# Patient Record
Sex: MALE | Race: OTHER
[De-identification: names, ages, dates, MRNs, and addresses within clinical notes are randomized per-mention and may not be internally consistent; named-entity substitution may affect disease eponyms.]

---

## 2017-02-24 ENCOUNTER — APPOINTMENT (OUTPATIENT)
Dept: GASTROENTEROLOGY | Facility: CLINIC | Age: 62
End: 2017-02-24

## 2017-05-19 ENCOUNTER — APPOINTMENT (OUTPATIENT)
Dept: GASTROENTEROLOGY | Facility: CLINIC | Age: 62
End: 2017-05-19

## 2017-05-19 ENCOUNTER — OUTPATIENT (OUTPATIENT)
Dept: OUTPATIENT SERVICES | Facility: HOSPITAL | Age: 62
LOS: 1 days | Discharge: HOME | End: 2017-05-19

## 2017-05-19 RX ORDER — POLYETHYLENE GLYCOL 3350 AND ELECTROLYTES WITH LEMON FLAVOR 236; 22.74; 6.74; 5.86; 2.97 G/4L; G/4L; G/4L; G/4L; G/4L
236 POWDER, FOR SOLUTION ORAL
Qty: 4000 | Refills: 0 | Status: ACTIVE | COMMUNITY
Start: 2017-05-19 | End: 1900-01-01

## 2017-06-28 DIAGNOSIS — Z86.010 PERSONAL HISTORY OF COLONIC POLYPS: ICD-10-CM

## 2017-06-28 DIAGNOSIS — K92.2 GASTROINTESTINAL HEMORRHAGE, UNSPECIFIED: ICD-10-CM

## 2017-07-03 ENCOUNTER — MESSAGE (OUTPATIENT)
Age: 62
End: 2017-07-03

## 2017-07-05 ENCOUNTER — MESSAGE (OUTPATIENT)
Age: 62
End: 2017-07-05

## 2017-08-18 ENCOUNTER — APPOINTMENT (OUTPATIENT)
Dept: GASTROENTEROLOGY | Facility: CLINIC | Age: 62
End: 2017-08-18

## 2018-05-12 ENCOUNTER — OUTPATIENT (OUTPATIENT)
Dept: OUTPATIENT SERVICES | Facility: HOSPITAL | Age: 63
LOS: 1 days | Discharge: HOME | End: 2018-05-12

## 2018-05-12 DIAGNOSIS — M54.17 RADICULOPATHY, LUMBOSACRAL REGION: ICD-10-CM

## 2018-05-12 DIAGNOSIS — Z13.1 ENCOUNTER FOR SCREENING FOR DIABETES MELLITUS: ICD-10-CM

## 2021-05-04 ENCOUNTER — OUTPATIENT (OUTPATIENT)
Dept: OUTPATIENT SERVICES | Facility: HOSPITAL | Age: 66
LOS: 1 days | Discharge: HOME | End: 2021-05-04

## 2021-05-04 DIAGNOSIS — Z11.59 ENCOUNTER FOR SCREENING FOR OTHER VIRAL DISEASES: ICD-10-CM

## 2021-05-07 ENCOUNTER — TRANSCRIPTION ENCOUNTER (OUTPATIENT)
Age: 66
End: 2021-05-07

## 2021-05-07 ENCOUNTER — OUTPATIENT (OUTPATIENT)
Dept: OUTPATIENT SERVICES | Facility: HOSPITAL | Age: 66
LOS: 1 days | Discharge: HOME | End: 2021-05-07
Payer: COMMERCIAL

## 2021-05-07 ENCOUNTER — RESULT REVIEW (OUTPATIENT)
Age: 66
End: 2021-05-07

## 2021-05-07 VITALS
HEART RATE: 58 BPM | OXYGEN SATURATION: 99 % | DIASTOLIC BLOOD PRESSURE: 79 MMHG | RESPIRATION RATE: 18 BRPM | SYSTOLIC BLOOD PRESSURE: 122 MMHG

## 2021-05-07 VITALS
TEMPERATURE: 98 F | SYSTOLIC BLOOD PRESSURE: 106 MMHG | RESPIRATION RATE: 18 BRPM | WEIGHT: 179.9 LBS | DIASTOLIC BLOOD PRESSURE: 74 MMHG | HEART RATE: 68 BPM | HEIGHT: 67 IN

## 2021-05-07 PROCEDURE — 88305 TISSUE EXAM BY PATHOLOGIST: CPT | Mod: 26

## 2021-05-07 RX ORDER — ROSUVASTATIN CALCIUM 5 MG/1
1 TABLET ORAL
Qty: 0 | Refills: 0 | DISCHARGE

## 2021-05-07 RX ORDER — LEVOTHYROXINE SODIUM 125 MCG
1 TABLET ORAL
Qty: 0 | Refills: 0 | DISCHARGE

## 2021-05-07 NOTE — CHART NOTE - NSCHARTNOTEFT_GEN_A_CORE
PACU ANESTHESIA ADMISSION NOTE      Procedure: Colonoscopy  Post op diagnosis:      ____  Intubated  TV:______       Rate: ______      FiO2: ______    _x___  Patent Airway    _x___  Full return of protective reflexes    _x___  Full recovery from anesthesia / back to baseline     Vitals:   T:  97.7         R:    16              BP:    96/57              Sat:   98                P: 61      Mental Status:  _x___ Awake   _____ Alert   _____ Drowsy   _____ Sedated    Nausea/Vomiting:  _x___ NO  ______Yes,   See Post - Op Orders          Pain Scale (0-10):  _____    Treatment: ____ None    _x___ See Post - Op/PCA Orders    Post - Operative Fluids:   ____ Oral   __x__ See Post - Op Orders    Plan: Discharge:   _x___Home       _____Floor     _____Critical Care    _____  Other:_________________    Comments:

## 2021-05-07 NOTE — ASU DISCHARGE PLAN (ADULT/PEDIATRIC) - CALL YOUR DOCTOR IF YOU HAVE ANY OF THE FOLLOWING:
Abdominal Binder given to PACU nurse to apply once Pt is able to get up   Bleeding that does not stop/Pain not relieved by Medications/Numbness, tingling, color or temperature change to extremity/Nausea and vomiting that does not stop/Unable to urinate/Excessive diarrhea/Inability to tolerate liquids or foods/Increased irritability or sluggishness

## 2021-05-11 LAB — SURGICAL PATHOLOGY STUDY: SIGNIFICANT CHANGE UP

## 2021-05-18 DIAGNOSIS — D12.0 BENIGN NEOPLASM OF CECUM: ICD-10-CM

## 2021-05-18 DIAGNOSIS — D12.8 BENIGN NEOPLASM OF RECTUM: ICD-10-CM

## 2021-05-18 DIAGNOSIS — D12.5 BENIGN NEOPLASM OF SIGMOID COLON: ICD-10-CM

## 2021-05-18 DIAGNOSIS — K64.8 OTHER HEMORRHOIDS: ICD-10-CM

## 2021-05-18 DIAGNOSIS — Z12.11 ENCOUNTER FOR SCREENING FOR MALIGNANT NEOPLASM OF COLON: ICD-10-CM

## 2021-05-18 DIAGNOSIS — E03.9 HYPOTHYROIDISM, UNSPECIFIED: ICD-10-CM

## 2021-05-18 DIAGNOSIS — K57.30 DIVERTICULOSIS OF LARGE INTESTINE WITHOUT PERFORATION OR ABSCESS WITHOUT BLEEDING: ICD-10-CM

## 2021-05-18 DIAGNOSIS — K64.4 RESIDUAL HEMORRHOIDAL SKIN TAGS: ICD-10-CM

## 2021-05-18 DIAGNOSIS — F17.200 NICOTINE DEPENDENCE, UNSPECIFIED, UNCOMPLICATED: ICD-10-CM

## 2021-05-18 DIAGNOSIS — E78.00 PURE HYPERCHOLESTEROLEMIA, UNSPECIFIED: ICD-10-CM

## 2022-08-10 PROBLEM — E78.00 PURE HYPERCHOLESTEROLEMIA, UNSPECIFIED: Chronic | Status: ACTIVE | Noted: 2021-05-07

## 2022-08-10 PROBLEM — E03.9 HYPOTHYROIDISM, UNSPECIFIED: Chronic | Status: ACTIVE | Noted: 2021-05-07

## 2022-08-29 ENCOUNTER — APPOINTMENT (OUTPATIENT)
Age: 67
End: 2022-08-29

## 2022-08-29 VITALS
OXYGEN SATURATION: 98 % | HEART RATE: 75 BPM | DIASTOLIC BLOOD PRESSURE: 80 MMHG | BODY MASS INDEX: 27.78 KG/M2 | RESPIRATION RATE: 14 BRPM | SYSTOLIC BLOOD PRESSURE: 130 MMHG | WEIGHT: 177 LBS | HEIGHT: 67 IN

## 2022-08-29 DIAGNOSIS — R06.02 SHORTNESS OF BREATH: ICD-10-CM

## 2022-08-29 DIAGNOSIS — Z87.438 PERSONAL HISTORY OF OTHER DISEASES OF MALE GENITAL ORGANS: ICD-10-CM

## 2022-08-29 PROCEDURE — 99204 OFFICE O/P NEW MOD 45 MIN: CPT | Mod: 25

## 2022-08-29 PROCEDURE — G0296 VISIT TO DETERM LDCT ELIG: CPT

## 2022-08-29 PROCEDURE — 71046 X-RAY EXAM CHEST 2 VIEWS: CPT

## 2022-08-29 NOTE — COUNSELING
[Yes] : Risk of tobacco use and health benefits of smoking cessation discussed: Yes [ - Annual Lung Cancer Screening/Share Decision Making Discussion] : Annual Lung Cancer Screening/Share Decision Making Discussion. (I have advised this patient to have a Low Dose CT (LDCT) scan of the lungs and have discussed the following with the patient in a shared decision making discussion:   Benefits of Detection and Early Treatment: There is adequate evidence that annual screening for lung cancer with LDCT in a population of high-risk persons can prevent a substantial number of lung cancer–related deaths. The magnitude of benefit depends on the individual patient's risk for lung cancer, as those who are at highest risk are most likely to benefit. Screening cannot prevent most lung cancer–related deaths, and does not replace smoking cessation. Harms of Detection and Early Intervention and Treatment: The harms associated with LDCT screening include false-negative and false-positive results, incidental findings, over diagnosis, and radiation exposure. False-positive LDCT results occur in a substantial proportion of screened persons; 95% of all positive results do not lead to a diagnosis of cancer. In a high-quality screening program, further imaging can resolve most false-positive results; however, some patients may require invasive procedures. Radiation harms, including cancer resulting from cumulative exposure to radiation, vary depending on the age at the start of screening; the number of scans received; and the person's exposure to other sources of radiation, particularly other medical imaging.)

## 2022-08-29 NOTE — PHYSICAL EXAM
[No Acute Distress] : no acute distress [Normal Oropharynx] : normal oropharynx [III] : Mallampati Class: III [Normal Appearance] : normal appearance [Normal Rate/Rhythm] : normal rate/rhythm [Normal S1, S2] : normal s1, s2 [No Resp Distress] : no resp distress [Clear to Auscultation Bilaterally] : clear to auscultation bilaterally [No Clubbing] : no clubbing [No Cyanosis] : no cyanosis [No Edema] : no edema [Normal Color/ Pigmentation] : normal color/ pigmentation [No Focal Deficits] : no focal deficits [Oriented x3] : oriented x3

## 2022-08-29 NOTE — END OF VISIT
[] : Fellow [FreeTextEntry3] : patient seen and examined agree above note \par will start symbicort \par PFT ordered \par ct scan screening \par counseled for smoking cessation

## 2022-08-29 NOTE — PROCEDURE
[FreeTextEntry1] : Reason :SOB\par Overall Findings:\par \par View Ap and lateral  \par \par \par Lung Fields:hyperinflated small linear atelectasis LLL\par \par Normal Lung Markings                   No Infiltrate\par \par Pleura:\par \par No Pleural Effusions                      \par \par Comparison Films\par \par Unchanged         New Findings      Improved             Worsened\par \par final Interpretation: no acute evidence of pulmonary disease\par

## 2022-09-02 ENCOUNTER — APPOINTMENT (OUTPATIENT)
Dept: CARDIOTHORACIC SURGERY | Facility: CLINIC | Age: 67
End: 2022-09-02

## 2022-09-02 ENCOUNTER — RESULT CHARGE (OUTPATIENT)
Age: 67
End: 2022-09-02

## 2022-09-02 ENCOUNTER — APPOINTMENT (OUTPATIENT)
Dept: CARDIOLOGY | Facility: CLINIC | Age: 67
End: 2022-09-02

## 2022-09-02 VITALS
SYSTOLIC BLOOD PRESSURE: 97 MMHG | WEIGHT: 176 LBS | DIASTOLIC BLOOD PRESSURE: 73 MMHG | HEIGHT: 67 IN | BODY MASS INDEX: 27.62 KG/M2 | HEART RATE: 73 BPM

## 2022-09-02 DIAGNOSIS — F17.210 NICOTINE DEPENDENCE, CIGARETTES, UNCOMPLICATED: ICD-10-CM

## 2022-09-02 PROCEDURE — 99204 OFFICE O/P NEW MOD 45 MIN: CPT | Mod: 25

## 2022-09-02 PROCEDURE — G0296 VISIT TO DETERM LDCT ELIG: CPT

## 2022-09-02 PROCEDURE — 93000 ELECTROCARDIOGRAM COMPLETE: CPT

## 2022-09-02 NOTE — DISCUSSION/SUMMARY
[FreeTextEntry1] : ECHO to evaluate for structural heart disease.\par Exercise nuclear stress test to evaluate for ischemia.\par Pulm follow-up.\par Follow-up 6-weeks.

## 2022-09-02 NOTE — PHYSICAL EXAM
[de-identified] : well appearing, no distress [de-identified] : reg, nL s1/s2, no m/r/g [de-identified] : faint scattered expiratory wheeze, otherwise CTA [de-identified] : no edema [de-identified] : alert, nL affect, logical conversation

## 2022-09-02 NOTE — REASON FOR VISIT
[Home] : at home, [unfilled] , at the time of the visit. [Medical Office: ([facility])___] : at the medical office located in  [Verbal consent obtained from patient] : the patient, [unfilled] [Initial Evaluation] : an initial evaluation visit [Review of Eligibility] : review of eligibility [Low-Dose CT Screening Discussion] : low-dose CT lung cancer screening discussion [Virtual Visit] : virtual visit

## 2022-09-02 NOTE — PLAN
[Smoking Cessation Guidance Provided] : Smoking cessation guidance was provided to patient [Smoking Cessation] : smoking cessation [Lifestlye changes] : lifestyle changes [Regular follow-up with healthcare provider] : regular follow-up with healthcare provider [Medication prescribed/changed as per orders] : medication prescribed/changed as per orders [FreeTextEntry1] : Plan:\par -Low Dose CT chest for lung cancer screening\par -Follow up with patient and his referring provider after his LDCT results have been reviewed by the multi-disciplinary clinical team\par -Encouraged smoking cessation\par -[facility] Smokers Quitline literature shared with patient and Staying Smoke Free brochure reviewed\par -Patient declined [facility] Smokers Quitline literature\par -Smoking Cessation was offered, refused \par -Referred to CTC - refused \par Should I Screen? tool utilized. 6 year risk of lung cancer is 4 %. Patient wishes to proceed with screening.\par Engaged in shared decision making with Mr. FLAVIA TOVAR . Answered all questions. He verbalized understanding and agreement. He knows to call back with any questions or concerns\par \par

## 2022-09-02 NOTE — REASON FOR VISIT
[FreeTextEntry1] : 67 year-old male referred for cardiac evaluation.\par \par No cardiac history.\par Risk factors include smoking.\par \par Exertional dyspnea (stairs).  Relatively new.  Breathing otherwise comfortable.\par \par No chest pain.  No  palpitations, lightheadedness, syncope.\par \par Saw Dr. Gaytan / pulm evaluation.\par \par PMH/PSH:\par COPD\par Hypothyroid\par BPH\par \par SOCIAL:\par Longstanding smoker (1/2 - 1/4 ppd)\par Contractor\par \par FAMILY:\par No known CV disease.

## 2022-09-19 ENCOUNTER — APPOINTMENT (OUTPATIENT)
Dept: CARDIOLOGY | Facility: CLINIC | Age: 67
End: 2022-09-19

## 2022-09-19 PROCEDURE — 93306 TTE W/DOPPLER COMPLETE: CPT

## 2022-09-21 ENCOUNTER — OUTPATIENT (OUTPATIENT)
Dept: OUTPATIENT SERVICES | Facility: HOSPITAL | Age: 67
LOS: 1 days | Discharge: HOME | End: 2022-09-21

## 2022-09-21 ENCOUNTER — RESULT REVIEW (OUTPATIENT)
Age: 67
End: 2022-09-21

## 2022-09-21 DIAGNOSIS — R06.02 SHORTNESS OF BREATH: ICD-10-CM

## 2022-09-21 PROCEDURE — 78452 HT MUSCLE IMAGE SPECT MULT: CPT | Mod: 26

## 2022-09-24 ENCOUNTER — OUTPATIENT (OUTPATIENT)
Dept: OUTPATIENT SERVICES | Facility: HOSPITAL | Age: 67
LOS: 1 days | Discharge: HOME | End: 2022-09-24

## 2022-09-24 ENCOUNTER — RESULT REVIEW (OUTPATIENT)
Age: 67
End: 2022-09-24

## 2022-09-24 DIAGNOSIS — R91.8 OTHER NONSPECIFIC ABNORMAL FINDING OF LUNG FIELD: ICD-10-CM

## 2022-09-24 PROCEDURE — 71271 CT THORAX LUNG CANCER SCR C-: CPT | Mod: 26

## 2022-11-21 ENCOUNTER — APPOINTMENT (OUTPATIENT)
Age: 67
End: 2022-11-21

## 2022-11-21 VITALS
RESPIRATION RATE: 14 BRPM | HEART RATE: 77 BPM | HEIGHT: 67 IN | OXYGEN SATURATION: 98 % | SYSTOLIC BLOOD PRESSURE: 106 MMHG | BODY MASS INDEX: 28.56 KG/M2 | DIASTOLIC BLOOD PRESSURE: 62 MMHG | WEIGHT: 182 LBS

## 2022-11-21 DIAGNOSIS — Z12.2 ENCOUNTER FOR SCREENING FOR MALIGNANT NEOPLASM OF RESPIRATORY ORGANS: ICD-10-CM

## 2022-11-21 PROCEDURE — 94010 BREATHING CAPACITY TEST: CPT

## 2022-11-21 PROCEDURE — 99214 OFFICE O/P EST MOD 30 MIN: CPT | Mod: 25

## 2022-11-21 NOTE — PHYSICAL EXAM
[No Acute Distress] : no acute distress [Normal Oropharynx] : normal oropharynx [Normal Appearance] : normal appearance [No Neck Mass] : no neck mass [Normal Rate/Rhythm] : normal rate/rhythm [Normal S1, S2] : normal s1, s2 [No Resp Distress] : no resp distress [Clear to Auscultation Bilaterally] : clear to auscultation bilaterally [Oriented x3] : oriented x3

## 2022-11-22 RX ORDER — BUDESONIDE AND FORMOTEROL FUMARATE DIHYDRATE 80; 4.5 UG/1; UG/1
80-4.5 AEROSOL RESPIRATORY (INHALATION) TWICE DAILY
Qty: 1 | Refills: 5 | Status: ACTIVE | COMMUNITY
Start: 2022-08-29 | End: 1900-01-01

## 2023-01-03 ENCOUNTER — OUTPATIENT (OUTPATIENT)
Dept: OUTPATIENT SERVICES | Facility: HOSPITAL | Age: 68
LOS: 1 days | Discharge: HOME | End: 2023-01-03
Payer: COMMERCIAL

## 2023-01-03 DIAGNOSIS — R06.02 SHORTNESS OF BREATH: ICD-10-CM

## 2023-01-03 PROCEDURE — 94727 GAS DIL/WSHOT DETER LNG VOL: CPT | Mod: 26

## 2023-01-03 PROCEDURE — 94729 DIFFUSING CAPACITY: CPT | Mod: 26

## 2023-01-03 PROCEDURE — 94060 EVALUATION OF WHEEZING: CPT | Mod: 26

## 2023-02-15 ENCOUNTER — APPOINTMENT (OUTPATIENT)
Age: 68
End: 2023-02-15

## 2024-05-15 ENCOUNTER — EMERGENCY (EMERGENCY)
Facility: HOSPITAL | Age: 69
LOS: 0 days | Discharge: HOME | End: 2024-05-15
Attending: EMERGENCY MEDICINE
Payer: COMMERCIAL

## 2024-05-15 VITALS
OXYGEN SATURATION: 99 % | DIASTOLIC BLOOD PRESSURE: 70 MMHG | SYSTOLIC BLOOD PRESSURE: 103 MMHG | HEART RATE: 64 BPM | TEMPERATURE: 98 F | RESPIRATION RATE: 18 BRPM

## 2024-05-15 VITALS
HEART RATE: 75 BPM | SYSTOLIC BLOOD PRESSURE: 107 MMHG | RESPIRATION RATE: 20 BRPM | TEMPERATURE: 98 F | DIASTOLIC BLOOD PRESSURE: 68 MMHG | OXYGEN SATURATION: 95 %

## 2024-05-15 DIAGNOSIS — H53.8 OTHER VISUAL DISTURBANCES: ICD-10-CM

## 2024-05-15 DIAGNOSIS — R05.9 COUGH, UNSPECIFIED: ICD-10-CM

## 2024-05-15 DIAGNOSIS — E03.9 HYPOTHYROIDISM, UNSPECIFIED: ICD-10-CM

## 2024-05-15 DIAGNOSIS — R42 DIZZINESS AND GIDDINESS: ICD-10-CM

## 2024-05-15 DIAGNOSIS — R06.02 SHORTNESS OF BREATH: ICD-10-CM

## 2024-05-15 DIAGNOSIS — N40.0 BENIGN PROSTATIC HYPERPLASIA WITHOUT LOWER URINARY TRACT SYMPTOMS: ICD-10-CM

## 2024-05-15 DIAGNOSIS — F17.210 NICOTINE DEPENDENCE, CIGARETTES, UNCOMPLICATED: ICD-10-CM

## 2024-05-15 LAB
ALBUMIN SERPL ELPH-MCNC: 4.4 G/DL — SIGNIFICANT CHANGE UP (ref 3.5–5.2)
ALP SERPL-CCNC: 54 U/L — SIGNIFICANT CHANGE UP (ref 30–115)
ALT FLD-CCNC: 19 U/L — SIGNIFICANT CHANGE UP (ref 0–41)
ANION GAP SERPL CALC-SCNC: 12 MMOL/L — SIGNIFICANT CHANGE UP (ref 7–14)
AST SERPL-CCNC: 20 U/L — SIGNIFICANT CHANGE UP (ref 0–41)
BILIRUB SERPL-MCNC: 0.3 MG/DL — SIGNIFICANT CHANGE UP (ref 0.2–1.2)
BUN SERPL-MCNC: 21 MG/DL — HIGH (ref 10–20)
CALCIUM SERPL-MCNC: 9.1 MG/DL — SIGNIFICANT CHANGE UP (ref 8.4–10.5)
CHLORIDE SERPL-SCNC: 107 MMOL/L — SIGNIFICANT CHANGE UP (ref 98–110)
CO2 SERPL-SCNC: 23 MMOL/L — SIGNIFICANT CHANGE UP (ref 17–32)
CREAT SERPL-MCNC: 1.4 MG/DL — SIGNIFICANT CHANGE UP (ref 0.7–1.5)
EGFR: 55 ML/MIN/1.73M2 — LOW
GLUCOSE SERPL-MCNC: 94 MG/DL — SIGNIFICANT CHANGE UP (ref 70–99)
HCT VFR BLD CALC: 39.2 % — LOW (ref 42–52)
HGB BLD-MCNC: 13.1 G/DL — LOW (ref 14–18)
MCHC RBC-ENTMCNC: 29.8 PG — SIGNIFICANT CHANGE UP (ref 27–31)
MCHC RBC-ENTMCNC: 33.4 G/DL — SIGNIFICANT CHANGE UP (ref 32–37)
MCV RBC AUTO: 89.3 FL — SIGNIFICANT CHANGE UP (ref 80–94)
NRBC # BLD: 0 /100 WBCS — SIGNIFICANT CHANGE UP (ref 0–0)
PLATELET # BLD AUTO: 176 K/UL — SIGNIFICANT CHANGE UP (ref 130–400)
PMV BLD: 10.1 FL — SIGNIFICANT CHANGE UP (ref 7.4–10.4)
POTASSIUM SERPL-MCNC: 4.3 MMOL/L — SIGNIFICANT CHANGE UP (ref 3.5–5)
POTASSIUM SERPL-SCNC: 4.3 MMOL/L — SIGNIFICANT CHANGE UP (ref 3.5–5)
PROT SERPL-MCNC: 7.3 G/DL — SIGNIFICANT CHANGE UP (ref 6–8)
RBC # BLD: 4.39 M/UL — LOW (ref 4.7–6.1)
RBC # FLD: 14.6 % — HIGH (ref 11.5–14.5)
SODIUM SERPL-SCNC: 142 MMOL/L — SIGNIFICANT CHANGE UP (ref 135–146)
WBC # BLD: 7.31 K/UL — SIGNIFICANT CHANGE UP (ref 4.8–10.8)
WBC # FLD AUTO: 7.31 K/UL — SIGNIFICANT CHANGE UP (ref 4.8–10.8)

## 2024-05-15 PROCEDURE — 85027 COMPLETE CBC AUTOMATED: CPT

## 2024-05-15 PROCEDURE — 99285 EMERGENCY DEPT VISIT HI MDM: CPT | Mod: 25

## 2024-05-15 PROCEDURE — 93010 ELECTROCARDIOGRAM REPORT: CPT

## 2024-05-15 PROCEDURE — 99285 EMERGENCY DEPT VISIT HI MDM: CPT

## 2024-05-15 PROCEDURE — 71045 X-RAY EXAM CHEST 1 VIEW: CPT

## 2024-05-15 PROCEDURE — 71045 X-RAY EXAM CHEST 1 VIEW: CPT | Mod: 26

## 2024-05-15 PROCEDURE — 70450 CT HEAD/BRAIN W/O DYE: CPT | Mod: 26,MC

## 2024-05-15 PROCEDURE — 80053 COMPREHEN METABOLIC PANEL: CPT

## 2024-05-15 PROCEDURE — 70450 CT HEAD/BRAIN W/O DYE: CPT | Mod: MC

## 2024-05-15 PROCEDURE — 36415 COLL VENOUS BLD VENIPUNCTURE: CPT

## 2024-05-15 PROCEDURE — 36000 PLACE NEEDLE IN VEIN: CPT

## 2024-05-15 PROCEDURE — 93005 ELECTROCARDIOGRAM TRACING: CPT

## 2024-05-15 RX ORDER — SODIUM CHLORIDE 9 MG/ML
1000 INJECTION INTRAMUSCULAR; INTRAVENOUS; SUBCUTANEOUS ONCE
Refills: 0 | Status: COMPLETED | OUTPATIENT
Start: 2024-05-15 | End: 2024-05-15

## 2024-05-15 RX ADMIN — SODIUM CHLORIDE 1000 MILLILITER(S): 9 INJECTION INTRAMUSCULAR; INTRAVENOUS; SUBCUTANEOUS at 14:40

## 2024-05-15 NOTE — ED PROVIDER NOTE - PATIENT PORTAL LINK FT
You can access the FollowMyHealth Patient Portal offered by [facility] by registering at the following website: [email or web address]. By joining [employer]’s FollowMyHealth portal, you will also be able to view your health information using other applications (apps) compatible with our system.

## 2024-05-15 NOTE — ED PROVIDER NOTE - CARE PLAN
1 Principal Discharge DX:	Dizziness  Assessment and plan of treatment:	Patient is a 69 y.o. M with history of hypothyroidism and BPH who presents with dizziness after standing from a seated position and chronic back pain. Vitals stable. EKG unremarkable, Head CT unremarkable, Chest X-Ray unremarkable, CBC and CMP unremarkable.   Plan:  Patient cleared for discharge  F/u with PCP for chronic issues

## 2024-05-15 NOTE — ED PROVIDER NOTE - ATTENDING CONTRIBUTION TO CARE
69-year-old who has been complaining of 1 year history of back pain that is intermittent with dizziness and blurry vision the symptoms are sometimes precipitated by standing states that it is becoming more frequent denies any vomiting or headache the pain radiates to his lower extremities but he has no weakness.  Denies any abdominal pain.  His exam shows that he has normal gait normal strength in his lower extremities normal sensory grossly is no focal tenderness over his back his lungs are clear his cranial nerves II through XII are intact.  His finger-nose is intact.  There is no nystagmus noted.  He has normal patellar reflexes bilaterally.  Plan is to obtain labs head CT chest x-ray and labs and ekg

## 2024-05-15 NOTE — ED PROVIDER NOTE - PLAN OF CARE
Patient is a 69 y.o. M with history of hypothyroidism and BPH who presents with dizziness after standing from a seated position and chronic back pain. Vitals stable. EKG unremarkable, Head CT unremarkable, Chest X-Ray unremarkable, CBC and CMP unremarkable.   Plan:  Patient cleared for discharge  F/u with PCP for chronic issues

## 2024-05-15 NOTE — ED PROVIDER NOTE - EKG/XRAY ADDITIONAL INFORMATION
independent interpretation of ED X-Ray films performed by Dr. Joaquin Lozano shows no infiltrate no ptx    independent interpretation of the ekg performed by Dr. Joaquin Lozano shows Normal sinus rhythm with a heart rate of 74 OR interval 142 QRS 74 QTc 392 no elevations or depressions

## 2024-05-15 NOTE — ED PROVIDER NOTE - DIFFERENTIAL DIAGNOSIS
Vertigo, musculoskeletal back pain, electrolyte abnormalities, intracranial mass. Differential Diagnosis

## 2024-05-15 NOTE — ED PROVIDER NOTE - NSFOLLOWUPINSTRUCTIONS_ED_ALL_ED_FT
Dizziness is a common problem. It is a feeling of unsteadiness or light-headedness. You may feel like you are about to faint. Dizziness can lead to injury if you stumble or fall. Anyone can become dizzy, but dizziness is more common in older adults. This condition can be caused by a number of things, including medicines, dehydration.    Follow these instructions at home:  Eating and drinking    Drink enough fluid to keep your urine pale yellow. This helps to keep you from becoming dehydrated. Try to drink more clear fluids, such as water.  Do not drink alcohol.  Limit your caffeine intake.   Limit your salt (sodium) intake    Activity  Avoid making quick movements.  Rise slowly from chairs and steady yourself until you feel okay.  In the morning, first sit up on the side of the bed. When you feel okay, stand slowly while you hold onto something until you know that your balance is good.  If you need to  one place for a long time, move your legs often. Tighten and relax the muscles in your legs while you are standing.  Do not drive or use machinery if you feel dizzy.  Avoid bending down if you feel dizzy. Place items in your home so that they are easy for you to reach without leaning over.    Lifestyle  Do not use any products that contain nicotine or tobacco. These products include cigarettes, chewing tobacco, and vaping devices, such as e-cigarettes. If you need help quitting, ask your health care provider.  Try to reduce your stress level by using methods such as yoga or meditation. Talk with your health care provider if you need help to manage your stress.    General instructions  Watch your dizziness for any changes.    Keep all follow-up visits. This is important.  Contact a health care provider if:  Your dizziness or light-headedness gets worse.  You feel nauseous.  You have reduced hearing.  You have a fever.  You have neck pain or a stiff neck.  Your dizziness leads to an injury or a fall.  Get help right away if:  You vomit or have diarrhea and are unable to eat or drink anything.  You have problems talking, walking, swallowing, or using your arms, hands, or legs.  You have any bleeding.  You are not thinking clearly or you have trouble forming sentences. It may take a friend or family member to notice this.  You have chest pain, abdominal pain, or sweating.  You develop a severe headache.  These symptoms may represent a serious problem that is an emergency. Do not drive yourself to the hospital.

## 2024-05-15 NOTE — ED ADULT TRIAGE NOTE - CHIEF COMPLAINT QUOTE
pt. states for the past few months has been dizzy , shaking a lot and legs give out . wants to be check out

## 2024-05-15 NOTE — ED PROVIDER NOTE - CLINICAL SUMMARY MEDICAL DECISION MAKING FREE TEXT BOX
Patient evaluated for chronic complaints with some mild acute exacerbation.  Lab work and imaging obtained which not show any acute pathology.  Given his normal exam and lab work in addition to imaging patient can follow-up with primary care for further workup

## 2024-05-15 NOTE — ED PROVIDER NOTE - CARE PROVIDER_API CALL
Robe Marcial  [address] [address]  Phone: [phone]  Fax: [phone]  Follow Up Time:

## 2024-05-29 ENCOUNTER — APPOINTMENT (OUTPATIENT)
Dept: NEUROLOGY | Facility: CLINIC | Age: 69
End: 2024-05-29
Payer: COMMERCIAL

## 2024-05-29 PROCEDURE — 99204 OFFICE O/P NEW MOD 45 MIN: CPT

## 2024-05-30 VITALS
WEIGHT: 182 LBS | BODY MASS INDEX: 28.56 KG/M2 | SYSTOLIC BLOOD PRESSURE: 92 MMHG | HEIGHT: 67 IN | DIASTOLIC BLOOD PRESSURE: 59 MMHG

## 2024-05-30 NOTE — ASSESSMENT
[FreeTextEntry1] : The impression is dizziness and lightheadedness which could be related to low blood pressure. I requested he have his pressure checked again by his medical doctor and have him look for anemia or a source of low blood pressure. We will see him again after his MRI is done. I don't think this is primarily on a CNS basis.     Total clinician time spent today on the patient is 45 minutes including preparing to see the patient, obtaining and/or reviewing and confirming history, performing medically necessary and appropriate examination, counseling and educating the patient and/or family, documenting clinical information in the EHR and communicating and/or referring to other healthcare professionals.    Entered by Valente Garnica acting as scribe for Dr. Bryant.   The documentation recorded by the scribe, in my presence, accurately reflects the service I personally performed, and the decisions made by me with my edits as appropriate.   Silvio Bryant MD, FAAN, FACP   Diplomate [employer] Board of Psychiatry & Neurology.

## 2024-05-30 NOTE — PHYSICAL EXAM
[FreeTextEntry1] : NEUROLOGICAL EXAMINATION:   *Blood pressure was 92/59 during today's visit.   Mental Status: Patient is a good informant with intact orientation, attention, concentration, recent and remote memory. Language evaluation reveals no evidence of aphasia. Fund of knowledge is normal.  Cranial Nerves Cranial Nerves:   II, III, IV, VI: Pupils are equal, round, and reactive to light and accommodation. No evidence of afferent pupillary defect. Visual fields are full to confrontation. Eye movements are full without evidence of nystagmus or internuclear ophthalmoplegia. Funduscopic examination reveals sharp disc margins.   V: Normal jaw movements. Normal facial sensation.   VII: Normal facial motor testing.   VIII: Grossly normal hearing bilaterally.   IX, X: Palate moves symmetrically. No dysarthria.   XI: Normal shoulder shrug and sternocleidomastoid power.   XII: Tongue appears normal and protrudes in the midline.   Motor: Normal bulk, tone, and power throughout.   Muscle Stretch Reflexes (right/left): 2+ symmetrical.   Plantar Responses: Flexor bilaterally.   Coordination: Normal finger to nose and heel to shin testing, no truncal ataxia and no tremor.   Sensation: Normal primary sensation. Normal double simultaneous stimulation.   Gait and Station: Normal base, stride, and turning. Normal toe and heel walking. Normal tandem. Negative Romberg.

## 2024-05-30 NOTE — HISTORY OF PRESENT ILLNESS
[FreeTextEntry1] : This is a neurologic consultation on Luis Mckeon. Patient is a 69-year-old man with a history of COPD cigarette smoker  who is seeing us on this occasion for symptoms of dizziness and lightheadedness which he has had for a few months. He doesn't pass out and he hasn't fainted but he does feel unsteady at times, he has not fallen. Of note is the fact that he did see cardiology 2 years ago and at that time his bp was recorded as 97/73 today his blood pressure was 92/59 which is on the low side for a 69-year-old man. He is not taking any medications, he is not diabetic, he does have some exertional dyspnea which is probably related to his cigarette smoking habit. He denies any major illnesses. He rarely drinks he does smoke at least 1 pack a day.

## 2024-06-06 ENCOUNTER — APPOINTMENT (OUTPATIENT)
Dept: CARDIOLOGY | Facility: CLINIC | Age: 69
End: 2024-06-06
Payer: COMMERCIAL

## 2024-06-06 VITALS — HEART RATE: 64 BPM | DIASTOLIC BLOOD PRESSURE: 80 MMHG | SYSTOLIC BLOOD PRESSURE: 108 MMHG

## 2024-06-06 VITALS — WEIGHT: 182 LBS | BODY MASS INDEX: 28.56 KG/M2 | HEIGHT: 67 IN

## 2024-06-06 DIAGNOSIS — R06.00 DYSPNEA, UNSPECIFIED: ICD-10-CM

## 2024-06-06 DIAGNOSIS — E03.9 HYPOTHYROIDISM, UNSPECIFIED: ICD-10-CM

## 2024-06-06 DIAGNOSIS — R42 DIZZINESS AND GIDDINESS: ICD-10-CM

## 2024-06-06 DIAGNOSIS — E78.2 MIXED HYPERLIPIDEMIA: ICD-10-CM

## 2024-06-06 DIAGNOSIS — N18.31 CHRONIC KIDNEY DISEASE, STAGE 3A: ICD-10-CM

## 2024-06-06 DIAGNOSIS — Z00.00 ENCOUNTER FOR GENERAL ADULT MEDICAL EXAMINATION W/OUT ABNORMAL FINDINGS: ICD-10-CM

## 2024-06-06 DIAGNOSIS — F17.200 NICOTINE DEPENDENCE, UNSPECIFIED, UNCOMPLICATED: ICD-10-CM

## 2024-06-06 PROCEDURE — 99204 OFFICE O/P NEW MOD 45 MIN: CPT | Mod: 25

## 2024-06-06 PROCEDURE — 93000 ELECTROCARDIOGRAM COMPLETE: CPT

## 2024-06-06 RX ORDER — METOPROLOL TARTRATE 100 MG/1
100 TABLET, FILM COATED ORAL
Qty: 2 | Refills: 0 | Status: ACTIVE | COMMUNITY
Start: 2024-06-06 | End: 1900-01-01

## 2024-06-06 RX ORDER — ROSUVASTATIN CALCIUM 20 MG/1
20 TABLET, FILM COATED ORAL
Qty: 90 | Refills: 3 | Status: ACTIVE | COMMUNITY
Start: 2024-06-06 | End: 1900-01-01

## 2024-06-06 RX ORDER — TAMSULOSIN HYDROCHLORIDE 0.4 MG/1
0.4 CAPSULE ORAL
Refills: 0 | Status: ACTIVE | COMMUNITY

## 2024-06-06 RX ORDER — LEVOTHYROXINE SODIUM 0.09 MG/1
88 TABLET ORAL
Refills: 0 | Status: ACTIVE | COMMUNITY

## 2024-06-19 NOTE — ED PROVIDER NOTE - OBJECTIVE STATEMENT
Detail Level: Generalized
69 y.o. M complaining of dizziness and sharp back pain for more than 1 year since straining back after lifting boxes at work. Pt reports that dizziness is accompanied by shortness of breath and blurry vision. Pt reports back pain occurs when sitting or standing for too long and dizziness and unsteadiness is precipitated by standing from a seated position. He reports back pain not currently present but is a 9/10 on pain scale when present and radiates to b/l LE. He reports that pain and dizziness started as every other day, then became daily and now it occurs multiple times during the day.
Detail Level: Detailed

## 2024-06-24 ENCOUNTER — APPOINTMENT (OUTPATIENT)
Dept: NEUROLOGY | Facility: CLINIC | Age: 69
End: 2024-06-24
Payer: COMMERCIAL

## 2024-06-24 VITALS
BODY MASS INDEX: 28.25 KG/M2 | HEART RATE: 67 BPM | HEIGHT: 67 IN | WEIGHT: 180 LBS | SYSTOLIC BLOOD PRESSURE: 114 MMHG | DIASTOLIC BLOOD PRESSURE: 73 MMHG

## 2024-06-24 PROCEDURE — 99214 OFFICE O/P EST MOD 30 MIN: CPT

## 2024-06-27 ENCOUNTER — APPOINTMENT (OUTPATIENT)
Dept: CARDIOLOGY | Facility: CLINIC | Age: 69
End: 2024-06-27

## 2024-06-27 PROCEDURE — 93306 TTE W/DOPPLER COMPLETE: CPT

## 2024-07-01 LAB
ALBUMIN SERPL ELPH-MCNC: 4.4 G/DL
ALP BLD-CCNC: 69 U/L
ALT SERPL-CCNC: 17 U/L
ANION GAP SERPL CALC-SCNC: 11 MMOL/L
AST SERPL-CCNC: 17 U/L
BILIRUB SERPL-MCNC: 0.2 MG/DL
BUN SERPL-MCNC: 16 MG/DL
CALCIUM SERPL-MCNC: 9.1 MG/DL
CHLORIDE SERPL-SCNC: 106 MMOL/L
CHOLEST SERPL-MCNC: 120 MG/DL
CO2 SERPL-SCNC: 24 MMOL/L
CREAT SERPL-MCNC: 1.28 MG/DL
EGFR: 61 ML/MIN/1.73M2
ESTIMATED AVERAGE GLUCOSE: 103 MG/DL
GLUCOSE SERPL-MCNC: 99 MG/DL
HBA1C MFR BLD HPLC: 5.2 %
HCT VFR BLD CALC: 39.4 %
HDLC SERPL-MCNC: 40 MG/DL
HGB BLD-MCNC: 12.2 G/DL
LDLC SERPL CALC-MCNC: 55 MG/DL
MCHC RBC-ENTMCNC: 29.8 PG
MCHC RBC-ENTMCNC: 31 GM/DL
MCV RBC AUTO: 96.1 FL
NONHDLC SERPL-MCNC: 79 MG/DL
NT-PROBNP SERPL-MCNC: 120 PG/ML
PLATELET # BLD AUTO: 217 K/UL
POTASSIUM SERPL-SCNC: 4.4 MMOL/L
PROT SERPL-MCNC: 7.4 G/DL
RBC # BLD: 4.1 M/UL
RBC # FLD: 14.9 %
SODIUM SERPL-SCNC: 141 MMOL/L
TRIGL SERPL-MCNC: 141 MG/DL
TSH SERPL-ACNC: 11.5 UIU/ML
WBC # FLD AUTO: 6.4 K/UL

## 2024-07-01 NOTE — DISCUSSION/SUMMARY
[EKG obtained to assist in diagnosis and management of assessed problem(s)] : EKG obtained to assist in diagnosis and management of assessed problem(s) [FreeTextEntry1] : Pt orthostatic increase hydration and on tamsulosin  d/w with urology  get CTA  stop smoking  get 2 d echo  start rosuvastatin 20 mg po qhs as high 10 year risk f/u in 4 months bloodwork

## 2024-07-01 NOTE — HISTORY OF PRESENT ILLNESS
[FreeTextEntry1] : PT with COPD, hypothyroid, CKDz 3a, h/o orthostatic hypotension sent for cv evaluation.   24: TSH: 19 improved to 29 TC: 212 T HDL 51 and T 10 year risk 17.2%   ekg: nsr with nsst changes.   Patient denies cp, syncope. Patient c/o sob, dizziness with postural changes.pt says starts shaking gets up and says was happening last month was more frequent, now improved. pt says does not sleep much  pt says sometimes drinks a half a gallon/day.  pt says sob at times, sometimes with getting up. 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 denies cp or chest pressure, no syncope. pt denies palpitations.  EKG: nsr with nsst changes.  pt SMOKING 1/2 PPD.   10/3/24: 24: BNP: 120, HDL: 40, T, LDL: 55, TSH: 11.5 24: EF: 50-55%, LVMI: 102 g/m2, ivsd/lvpw: 1.3 cm, e' sept; 0.07 m/s, E [place]: 0.47 m/s, LVOT VTI: 14.5 cm, GLS: -17.1%, basilar/mid decrease. DD1, SoV: 3.7 cm, Asc Ao: 3.7 cm. borderline LVH

## 2024-07-12 ENCOUNTER — RESULT REVIEW (OUTPATIENT)
Age: 69
End: 2024-07-12

## 2024-07-12 ENCOUNTER — OUTPATIENT (OUTPATIENT)
Dept: OUTPATIENT SERVICES | Facility: HOSPITAL | Age: 69
LOS: 1 days | End: 2024-07-12
Payer: COMMERCIAL

## 2024-07-12 DIAGNOSIS — Z00.8 ENCOUNTER FOR OTHER GENERAL EXAMINATION: ICD-10-CM

## 2024-07-12 DIAGNOSIS — R06.02 SHORTNESS OF BREATH: ICD-10-CM

## 2024-07-12 PROCEDURE — 75574 CT ANGIO HRT W/3D IMAGE: CPT | Mod: 26

## 2024-07-12 PROCEDURE — 75574 CT ANGIO HRT W/3D IMAGE: CPT

## 2024-07-13 ENCOUNTER — APPOINTMENT (OUTPATIENT)
Dept: MRI IMAGING | Facility: CLINIC | Age: 69
End: 2024-07-13

## 2024-07-13 DIAGNOSIS — R06.02 SHORTNESS OF BREATH: ICD-10-CM

## 2024-07-23 PROBLEM — I25.10 CORONARY ARTERY STENOSIS: Status: ACTIVE | Noted: 2024-07-23

## 2024-07-24 ENCOUNTER — RESULT REVIEW (OUTPATIENT)
Age: 69
End: 2024-07-24

## 2024-07-28 VITALS
HEART RATE: 65 BPM | WEIGHT: 179.9 LBS | OXYGEN SATURATION: 97 % | DIASTOLIC BLOOD PRESSURE: 89 MMHG | HEIGHT: 67 IN | SYSTOLIC BLOOD PRESSURE: 141 MMHG | RESPIRATION RATE: 16 BRPM

## 2024-07-28 NOTE — H&P CARDIOLOGY - COMMENTS
68 y/o male w/ PMHx of HTN, HLD, hypothyroidism, BPH, CKD (baseline Cr 1.3-1.4), who presented to his cardiologist with c/o SOB on walking uphill and 1-2 flights of stairs, with subsequent abnormal CCTA on 7/12/24, and CT FFR 7/25/24 -> LAD 0.59, LCx , OM 0.82, RCA 0.82. Patient presents today for [facility] with possible intervention if clinically indicated.

## 2024-07-28 NOTE — H&P CARDIOLOGY - NSICDXPASTMEDICALHX_GEN_ALL_CORE_FT
PAST MEDICAL HISTORY:  High cholesterol     Hypothyroidism, unspecified type      PAST MEDICAL HISTORY:  BPH (benign prostatic hyperplasia)     Chronic kidney disease (CKD)     High cholesterol     HTN (hypertension)     Hypothyroidism, unspecified type

## 2024-07-28 NOTE — H&P CARDIOLOGY - HISTORY OF PRESENT ILLNESS
Patient is a 70 y/o male w/ PMHx: BPH. Patient reports SOB and dizziness with postural changes. CCTA 7/22  and FFR LAD 0.59, LCx  OM 0.82, RCA 0.82. Patient presents today for [facility] with possible intervention.    Pre cath note:    indication:  [ ] STEMI                [ ] NSTEMI                 [ ] Acute coronary syndrome                     [ ]Unstable Angina   [ ] high risk  [ ] intermediate risk  [ ] low risk                     [ ] Stable Angina     non-invasive testing:    CCTA    Date:    7/22    result: [ ] high risk  [x ] intermediate risk  [ ] low risk    Anti- Anginal medications:                    [ ] not used                       [x ] used :     -BB, Ranexa              [ ] not used but strong indication not to use    Ejection Fraction                   [ ] <29            [ ] 30-39%   [ ] 40-49%     [ ]>50%    CHF                   [ ] active (within last 14 days on meds   [ ] Chronic (on meds but no exacerbation)    COPD                   [ ] mild (on chronic bronchodilators)  [ ] moderate (on chronic steroid therapy)      [ ] severe (indication for home O2 or PACO2 >50)    Other risk factors:                       [ ] Previous MI                     [ ] CVA/ stroke                    [ ] carotid stent/ CEA                    [ ] PVD/PAD- (arterial aneurysm, non-palpable pulses, tortuous vessel with inability to insert catheter, infra-renal dissection, renal or subclavian artery stenosis)                    [ ] diabetic                    [ ] previous CABG                    [ ] Renal Failure             Right Filippo Test:    Adjusted Cath Bleeding Risk: 1.5%    Pre-cath Hydration: (  )  cc IV bolus x 1 over 1 hr followed by (  ) NS @ 75cc/hr until procedure (up to 2 hrs) if EF> 50%                                                                                                                           (  ) NS @ 50cc/hr until procedure (up to 2 hrs) if EF< 50%                                      (  ) No precath hydration d/t    EF, date:     EKG, date:    68 y/o male, current smoker, w/ PMHx of HTN, HLD, hypothyroidism, BPH, CKD (baseline Cr 1.3-1.4), who presented to his cardiologist with c/o SOB on walking uphill and 1-2 flights of stairs, relieved with rest.  Pt also c/o occasional dizziness with postural changes.  He underwent a CCTA on 7/12/24 which revealed a CA score of 722, short segment mixed predominantly dense calcified plaque proximal LAD resulting in moderate to severe narrowing, mixed calcified and noncalcified plaque proximal LCx resulting in moderate to severe narrowing, and mixed predominantly dense calcified plaque proximal and mid RCA resulting in moderate narrowing.  CT FFR 7/25/24 -> LAD 0.59, LCx , OM 0.82, RCA 0.82.   Patient presents today for [facility] with possible intervention if clinically indicated.    Pre cath note:    indication:  [ ] STEMI                [ ] NSTEMI                 [ ] Acute coronary syndrome                     [ ]Unstable Angina   [ ] high risk  [ ] intermediate risk  [ ] low risk                     [ x] Stable Angina     non-invasive testing:    CCTA    Date:    7/12/24    result: [ ] high risk  [x ] intermediate risk  [ ] low risk    Anti- Anginal medications:                    [ ] not used                       [x ] used :   on BBn and Ranexa              [ ] not used but strong indication not to use    Ejection Fraction                   [ ] <29            [ ] 30-39%   [ ] 40-49%     [ ]>50%    CHF                   [ ] active (within last 14 days on meds   [ ] Chronic (on meds but no exacerbation)    COPD                   [ ] mild (on chronic bronchodilators)  [ ] moderate (on chronic steroid therapy)      [ ] severe (indication for home O2 or PACO2 >50)    Other risk factors:                       [ ] Previous MI                     [ ] CVA/ stroke                    [ ] carotid stent/ CEA                    [ ] PVD/PAD- (arterial aneurysm, non-palpable pulses, tortuous vessel with inability to insert catheter, infra-renal dissection, renal or subclavian artery stenosis)                    [ ] diabetic                    [ ] previous CABG                    [x ] Renal Failure       Right Filippo Test: positive    Adjusted Cath Bleeding Risk: 1.5%    Pre-cath Hydration: (x  )  cc IV bolus x 1 over 1 hr followed by ( x ) NS @ 100cc/hr until procedure                                                                                                                               70 y/o male, current smoker, w/ PMHx of HTN, HLD, hypothyroidism, BPH, CKD (baseline Cr 1.3-1.4), who presented to his cardiologist with c/o SOB on walking uphill and 1-2 flights of stairs, relieved with rest.  Pt also c/o occasional dizziness with postural changes.  He underwent a CCTA on 7/12/24 which revealed a CA score of 722, short segment mixed predominantly dense calcified plaque proximal LAD resulting in moderate to severe narrowing, mixed calcified and noncalcified plaque proximal LCx resulting in moderate to severe narrowing, and mixed predominantly dense calcified plaque proximal and mid RCA resulting in moderate narrowing.  CT FFR 7/25/24 -> LAD 0.59, LCx , OM 0.82, RCA 0.82.   Patient presents today for [facility] with possible intervention if clinically indicated.    Pre cath note:    indication:  [ ] STEMI                [ ] NSTEMI                 [ ] Acute coronary syndrome                     [ ]Unstable Angina   [ ] high risk  [ ] intermediate risk  [ ] low risk                     [ x] Stable Angina     non-invasive testing:    CCTA    Date:    7/12/24    result: [ ] high risk  [x ] intermediate risk  [ ] low risk    Anti- Anginal medications:                    [ ] not used                       [x ] used :   on BBn and Ranexa              [ ] not used but strong indication not to use    Ejection Fraction                   [ ] <29            [ ] 30-39%   [ ] 40-49%     [ ]>50%    CHF                   [ ] active (within last 14 days on meds   [ ] Chronic (on meds but no exacerbation)    COPD                   [ ] mild (on chronic bronchodilators)  [ ] moderate (on chronic steroid therapy)      [ ] severe (indication for home O2 or PACO2 >50)    Other risk factors:                       [ ] Previous MI                     [ ] CVA/ stroke                    [ ] carotid stent/ CEA                    [ ] PVD/PAD- (arterial aneurysm, non-palpable pulses, tortuous vessel with inability to insert catheter, infra-renal dissection, renal or subclavian artery stenosis)                    [ ] diabetic                    [ ] previous CABG                    [x ] Renal Failure       Right Filippo Test: positive    Adjusted Cath Bleeding Risk: 1.5%    Pre-cath Hydration: (x  )  cc IV bolus x 1 over 1 hr followed by ( x ) NS @ 100cc/hr until procedure

## 2024-07-31 ENCOUNTER — INPATIENT (INPATIENT)
Facility: HOSPITAL | Age: 69
LOS: 8 days | Discharge: NURSING, REHAB OR LONG-TERM CARE | DRG: 234 | End: 2024-08-09
Attending: THORACIC SURGERY (CARDIOTHORACIC VASCULAR SURGERY) | Admitting: THORACIC SURGERY (CARDIOTHORACIC VASCULAR SURGERY)
Payer: COMMERCIAL

## 2024-07-31 DIAGNOSIS — N40.0 BENIGN PROSTATIC HYPERPLASIA WITHOUT LOWER URINARY TRACT SYMPTOMS: ICD-10-CM

## 2024-07-31 DIAGNOSIS — R93.1 ABNORMAL FINDINGS ON DIAGNOSTIC IMAGING OF HEART AND CORONARY CIRCULATION: ICD-10-CM

## 2024-07-31 DIAGNOSIS — R42 DIZZINESS AND GIDDINESS: ICD-10-CM

## 2024-07-31 LAB
A1C WITH ESTIMATED AVERAGE GLUCOSE RESULT: 5.8 % — HIGH (ref 4–5.6)
ALBUMIN SERPL ELPH-MCNC: 4.2 G/DL — SIGNIFICANT CHANGE UP (ref 3.5–5.2)
ALP SERPL-CCNC: 65 U/L — SIGNIFICANT CHANGE UP (ref 30–115)
ALT FLD-CCNC: 16 U/L — SIGNIFICANT CHANGE UP (ref 0–41)
ANION GAP SERPL CALC-SCNC: 11 MMOL/L — SIGNIFICANT CHANGE UP (ref 7–14)
APTT BLD: 37 SEC — SIGNIFICANT CHANGE UP (ref 27–39.2)
AST SERPL-CCNC: 16 U/L — SIGNIFICANT CHANGE UP (ref 0–41)
BILIRUB DIRECT SERPL-MCNC: <0.2 MG/DL — SIGNIFICANT CHANGE UP (ref 0–0.3)
BILIRUB INDIRECT FLD-MCNC: >0.2 MG/DL — SIGNIFICANT CHANGE UP (ref 0.2–1.2)
BILIRUB SERPL-MCNC: 0.4 MG/DL — SIGNIFICANT CHANGE UP (ref 0.2–1.2)
BUN SERPL-MCNC: 22 MG/DL — HIGH (ref 10–20)
CALCIUM SERPL-MCNC: 8.4 MG/DL — SIGNIFICANT CHANGE UP (ref 8.4–10.5)
CHLORIDE SERPL-SCNC: 111 MMOL/L — HIGH (ref 98–110)
CO2 SERPL-SCNC: 23 MMOL/L — SIGNIFICANT CHANGE UP (ref 17–32)
CREAT SERPL-MCNC: 1 MG/DL — SIGNIFICANT CHANGE UP (ref 0.7–1.5)
EGFR: 82 ML/MIN/1.73M2 — SIGNIFICANT CHANGE UP
ESTIMATED AVERAGE GLUCOSE: 120 MG/DL — HIGH (ref 68–114)
GLUCOSE SERPL-MCNC: 76 MG/DL — SIGNIFICANT CHANGE UP (ref 70–99)
HCT VFR BLD CALC: 38 % — LOW (ref 42–52)
HGB BLD-MCNC: 12.3 G/DL — LOW (ref 14–18)
INR BLD: 1.09 RATIO — SIGNIFICANT CHANGE UP (ref 0.65–1.3)
MAGNESIUM SERPL-MCNC: 2.2 MG/DL — SIGNIFICANT CHANGE UP (ref 1.8–2.4)
MCHC RBC-ENTMCNC: 30.3 PG — SIGNIFICANT CHANGE UP (ref 27–31)
MCHC RBC-ENTMCNC: 32.4 G/DL — SIGNIFICANT CHANGE UP (ref 32–37)
MCV RBC AUTO: 93.6 FL — SIGNIFICANT CHANGE UP (ref 80–94)
MRSA PCR RESULT.: NEGATIVE — SIGNIFICANT CHANGE UP
NRBC # BLD: 0 /100 WBCS — SIGNIFICANT CHANGE UP (ref 0–0)
NT-PROBNP SERPL-SCNC: 116 PG/ML — SIGNIFICANT CHANGE UP (ref 0–300)
PLATELET # BLD AUTO: 200 K/UL — SIGNIFICANT CHANGE UP (ref 130–400)
PMV BLD: 9.8 FL — SIGNIFICANT CHANGE UP (ref 7.4–10.4)
POTASSIUM SERPL-MCNC: 4.5 MMOL/L — SIGNIFICANT CHANGE UP (ref 3.5–5)
POTASSIUM SERPL-SCNC: 4.5 MMOL/L — SIGNIFICANT CHANGE UP (ref 3.5–5)
PROT SERPL-MCNC: 7.1 G/DL — SIGNIFICANT CHANGE UP (ref 6–8)
PROTHROM AB SERPL-ACNC: 12.4 SEC — SIGNIFICANT CHANGE UP (ref 9.95–12.87)
RBC # BLD: 4.06 M/UL — LOW (ref 4.7–6.1)
RBC # FLD: 14.6 % — HIGH (ref 11.5–14.5)
SODIUM SERPL-SCNC: 145 MMOL/L — SIGNIFICANT CHANGE UP (ref 135–146)
T3 SERPL-MCNC: 87 NG/DL — SIGNIFICANT CHANGE UP (ref 80–200)
T4 AB SER-ACNC: 7.2 UG/DL — SIGNIFICANT CHANGE UP (ref 4.6–12)
T4 FREE SERPL-MCNC: 1.3 NG/DL — SIGNIFICANT CHANGE UP (ref 0.9–1.8)
TSH SERPL-MCNC: 3.26 UIU/ML — SIGNIFICANT CHANGE UP (ref 0.27–4.2)
WBC # BLD: 5.62 K/UL — SIGNIFICANT CHANGE UP (ref 4.8–10.8)
WBC # FLD AUTO: 5.62 K/UL — SIGNIFICANT CHANGE UP (ref 4.8–10.8)

## 2024-07-31 PROCEDURE — C1769: CPT

## 2024-07-31 PROCEDURE — 85018 HEMOGLOBIN: CPT

## 2024-07-31 PROCEDURE — 97161 PT EVAL LOW COMPLEX 20 MIN: CPT | Mod: GP

## 2024-07-31 PROCEDURE — 93005 ELECTROCARDIOGRAM TRACING: CPT

## 2024-07-31 PROCEDURE — 86891 AUTOLOGOUS BLOOD OP SALVAGE: CPT

## 2024-07-31 PROCEDURE — 82803 BLOOD GASES ANY COMBINATION: CPT

## 2024-07-31 PROCEDURE — 94640 AIRWAY INHALATION TREATMENT: CPT

## 2024-07-31 PROCEDURE — 86923 COMPATIBILITY TEST ELECTRIC: CPT

## 2024-07-31 PROCEDURE — 86901 BLOOD TYPING SEROLOGIC RH(D): CPT

## 2024-07-31 PROCEDURE — 71046 X-RAY EXAM CHEST 2 VIEWS: CPT

## 2024-07-31 PROCEDURE — 84132 ASSAY OF SERUM POTASSIUM: CPT

## 2024-07-31 PROCEDURE — 71250 CT THORAX DX C-: CPT | Mod: 26

## 2024-07-31 PROCEDURE — 93880 EXTRACRANIAL BILAT STUDY: CPT

## 2024-07-31 PROCEDURE — 80053 COMPREHEN METABOLIC PANEL: CPT

## 2024-07-31 PROCEDURE — 71046 X-RAY EXAM CHEST 2 VIEWS: CPT | Mod: 26

## 2024-07-31 PROCEDURE — 85014 HEMATOCRIT: CPT

## 2024-07-31 PROCEDURE — 71045 X-RAY EXAM CHEST 1 VIEW: CPT

## 2024-07-31 PROCEDURE — P9045: CPT

## 2024-07-31 PROCEDURE — 83605 ASSAY OF LACTIC ACID: CPT

## 2024-07-31 PROCEDURE — 97530 THERAPEUTIC ACTIVITIES: CPT | Mod: GP

## 2024-07-31 PROCEDURE — 85027 COMPLETE CBC AUTOMATED: CPT

## 2024-07-31 PROCEDURE — 74176 CT ABD & PELVIS W/O CONTRAST: CPT | Mod: 26

## 2024-07-31 PROCEDURE — 97110 THERAPEUTIC EXERCISES: CPT | Mod: GP

## 2024-07-31 PROCEDURE — 86900 BLOOD TYPING SEROLOGIC ABO: CPT

## 2024-07-31 PROCEDURE — 93458 L HRT ARTERY/VENTRICLE ANGIO: CPT

## 2024-07-31 PROCEDURE — C1751: CPT

## 2024-07-31 PROCEDURE — 36415 COLL VENOUS BLD VENIPUNCTURE: CPT

## 2024-07-31 PROCEDURE — 83735 ASSAY OF MAGNESIUM: CPT

## 2024-07-31 PROCEDURE — 84295 ASSAY OF SERUM SODIUM: CPT

## 2024-07-31 PROCEDURE — 74176 CT ABD & PELVIS W/O CONTRAST: CPT | Mod: MC

## 2024-07-31 PROCEDURE — 94010 BREATHING CAPACITY TEST: CPT

## 2024-07-31 PROCEDURE — 71250 CT THORAX DX C-: CPT | Mod: MC

## 2024-07-31 PROCEDURE — 94002 VENT MGMT INPAT INIT DAY: CPT

## 2024-07-31 PROCEDURE — 93458 L HRT ARTERY/VENTRICLE ANGIO: CPT | Mod: 26

## 2024-07-31 PROCEDURE — 82330 ASSAY OF CALCIUM: CPT

## 2024-07-31 PROCEDURE — 93306 TTE W/DOPPLER COMPLETE: CPT

## 2024-07-31 PROCEDURE — 99221 1ST HOSP IP/OBS SF/LOW 40: CPT

## 2024-07-31 PROCEDURE — 86850 RBC ANTIBODY SCREEN: CPT

## 2024-07-31 PROCEDURE — 97116 GAIT TRAINING THERAPY: CPT | Mod: GP

## 2024-07-31 PROCEDURE — 93880 EXTRACRANIAL BILAT STUDY: CPT | Mod: 26

## 2024-07-31 PROCEDURE — 94760 N-INVAS EAR/PLS OXIMETRY 1: CPT

## 2024-07-31 PROCEDURE — 85025 COMPLETE CBC W/AUTO DIFF WBC: CPT

## 2024-07-31 PROCEDURE — C1889: CPT

## 2024-07-31 PROCEDURE — 85610 PROTHROMBIN TIME: CPT

## 2024-07-31 PROCEDURE — 85730 THROMBOPLASTIN TIME PARTIAL: CPT

## 2024-07-31 PROCEDURE — 81001 URINALYSIS AUTO W/SCOPE: CPT

## 2024-07-31 PROCEDURE — 82962 GLUCOSE BLOOD TEST: CPT

## 2024-07-31 RX ORDER — BACTERIOSTATIC SODIUM CHLORIDE 0.9 %
3 VIAL (ML) INJECTION EVERY 8 HOURS
Refills: 0 | Status: DISCONTINUED | OUTPATIENT
Start: 2024-07-31 | End: 2024-08-02

## 2024-07-31 RX ORDER — METOPROLOL TARTRATE 100 MG
12.5 TABLET ORAL EVERY 12 HOURS
Refills: 0 | Status: DISCONTINUED | OUTPATIENT
Start: 2024-07-31 | End: 2024-08-02

## 2024-07-31 RX ORDER — ASPIRIN 500 MG
81 TABLET ORAL DAILY
Refills: 0 | Status: DISCONTINUED | OUTPATIENT
Start: 2024-07-31 | End: 2024-08-02

## 2024-07-31 RX ORDER — BACTERIOSTATIC SODIUM CHLORIDE 0.9 %
250 VIAL (ML) INJECTION ONCE
Refills: 0 | Status: COMPLETED | OUTPATIENT
Start: 2024-07-31 | End: 2024-07-31

## 2024-07-31 RX ORDER — PANTOPRAZOLE SODIUM 20 MG/1
40 TABLET, DELAYED RELEASE ORAL
Refills: 0 | Status: DISCONTINUED | OUTPATIENT
Start: 2024-07-31 | End: 2024-08-02

## 2024-07-31 RX ORDER — BACTERIOSTATIC SODIUM CHLORIDE 0.9 %
1000 VIAL (ML) INJECTION
Refills: 0 | Status: DISCONTINUED | OUTPATIENT
Start: 2024-07-31 | End: 2024-08-02

## 2024-07-31 RX ORDER — ROSUVASTATIN CALCIUM 10 MG
1 TABLET ORAL
Refills: 0 | DISCHARGE

## 2024-07-31 RX ORDER — HEPARIN SODIUM 1000 [USP'U]/ML
5000 INJECTION, SOLUTION INTRAVENOUS; SUBCUTANEOUS EVERY 12 HOURS
Refills: 0 | Status: DISCONTINUED | OUTPATIENT
Start: 2024-07-31 | End: 2024-08-02

## 2024-07-31 RX ORDER — AMLODIPINE BESYLATE 2.5 MG/1
2.5 TABLET ORAL ONCE
Refills: 0 | Status: DISCONTINUED | OUTPATIENT
Start: 2024-07-31 | End: 2024-07-31

## 2024-07-31 RX ORDER — LEVOTHYROXINE SODIUM 175 MCG
1 TABLET ORAL
Refills: 0 | DISCHARGE

## 2024-07-31 RX ORDER — CHLORHEXIDINE GLUCONATE 500 MG/1
1 CLOTH TOPICAL ONCE
Refills: 0 | Status: DISCONTINUED | OUTPATIENT
Start: 2024-07-31 | End: 2024-08-02

## 2024-07-31 RX ORDER — LEVOTHYROXINE SODIUM 175 MCG
88 TABLET ORAL DAILY
Refills: 0 | Status: DISCONTINUED | OUTPATIENT
Start: 2024-07-31 | End: 2024-08-02

## 2024-07-31 RX ORDER — RANOLAZINE 1000 MG/1
500 TABLET, FILM COATED, EXTENDED RELEASE ORAL
Refills: 0 | Status: DISCONTINUED | OUTPATIENT
Start: 2024-07-31 | End: 2024-08-02

## 2024-07-31 RX ORDER — TAMSULOSIN HCL 0.4 MG
1 CAPSULE ORAL
Refills: 0 | DISCHARGE

## 2024-07-31 RX ORDER — BACTERIOSTATIC SODIUM CHLORIDE 0.9 %
1000 VIAL (ML) INJECTION
Refills: 0 | Status: DISCONTINUED | OUTPATIENT
Start: 2024-07-31 | End: 2024-07-31

## 2024-07-31 RX ORDER — TAMSULOSIN HCL 0.4 MG
0.4 CAPSULE ORAL AT BEDTIME
Refills: 0 | Status: DISCONTINUED | OUTPATIENT
Start: 2024-07-31 | End: 2024-08-02

## 2024-07-31 RX ORDER — ASPIRIN 500 MG
324 TABLET ORAL ONCE
Refills: 0 | Status: COMPLETED | OUTPATIENT
Start: 2024-07-31 | End: 2024-07-31

## 2024-07-31 RX ORDER — ATORVASTATIN CALCIUM 40 MG/1
80 TABLET, FILM COATED ORAL AT BEDTIME
Refills: 0 | Status: DISCONTINUED | OUTPATIENT
Start: 2024-07-31 | End: 2024-08-02

## 2024-07-31 RX ADMIN — Medication 0.4 MILLIGRAM(S): at 22:42

## 2024-07-31 RX ADMIN — Medication 324 MILLIGRAM(S): at 09:37

## 2024-07-31 RX ADMIN — Medication 3 MILLILITER(S): at 22:36

## 2024-07-31 RX ADMIN — Medication 500 MILLILITER(S): at 09:37

## 2024-07-31 RX ADMIN — ATORVASTATIN CALCIUM 80 MILLIGRAM(S): 40 TABLET, FILM COATED ORAL at 22:42

## 2024-07-31 RX ADMIN — Medication 150 MILLILITER(S): at 15:09

## 2024-07-31 NOTE — PATIENT PROFILE ADULT - SURGICAL SITE DESCRIPTION
Last appt 1/20/21(vidoes visit)  She uses oxycodone and lorazepam very sparingly    Lorazepam  0.5MG / Tablet  Rx# [id number] Benzodiazepine Qty: 90  Days: 30  Refills: 0 Prescribed: 9/15/2021  Dispensed: 9/15/2021  Sold: 9/16/2021       Right wrist cath site

## 2024-07-31 NOTE — CHART NOTE - NSCHARTNOTEFT_GEN_A_CORE
PRE-OP DIAGNOSIS:  Unstable angina, Abnormal CTA      PROCEDURE:     [X] Coronary Angiogram     [X] LHC     [] LVG     [] RHC     [] Intervention (see below)         PHYSICIAN:  Dr. Stanton    ASSISTANT:  Dr. Warren       PROCEDURE DESCRIPTION:     Consent:      [X] Patient     [] Family Member     []  Used        Anesthesia:     [X] General     [] Sedation     [X] Local        Access & Closure:     [X] 6Fr R Radial Artery     [] Fr Femoral Artery     [] Fr Femoral Vein     [] Fr Brachial Vein       IV Contrast: 30 mL        Intervention: None      Implants: None       FINDINGS:     Coronary Dominance: Right dominate      LM: Mild calcified disease distally    LAD: 80% ostial LAD calcified disease. 80% lesion distal LAD    CX: 80% stenosis OM1, 80% stenosis distal Lcx, OM3  with distal collaterals from RPL  RCA: Large dominate vessel. 80% lesion Mid RCA (FFR 0.82, delta 0.12)    LVEDP: 8 mmHg     ESTIMATED BLOOD LOSS: < 10 mL        CONDITION:     [X] Good     [] Fair     [] Critical        SPECIMEN REMOVED: N/A       POST-OP DIAGNOSIS:      [] Normal Coronary Angiogram     [] Mild Coronary Artery Disease (< 50% stenosis)     [X] 3 Vessel Coronary Artery Disease. SYNTAX 37       PLAN OF CARE:     [] D/C Home Today     [] Return to In-patient bed     [] Admit for observation     [] Return for Staged Procedure     [X] CT Surgery Consult     [X] Medications: Aspirin, statin    [X] IV Fluids: NS 100cc/hr x 3hrs

## 2024-07-31 NOTE — ASU DISCHARGE PLAN (ADULT/PEDIATRIC) - CARE PROVIDER_API CALL
Aayush Heard  Cardiology  [address] [place] [address]  Phone: [phone]  Fax: [phone]  Follow Up Time: 2 weeks

## 2024-07-31 NOTE — PATIENT PROFILE ADULT - ARRIVAL FROM
Post-Partum Day Number 2 Progress Note    Kamala Cheng     Assessment: Doing well, post partum day 2 from [address] in term labor    Plan:   - Discharge home today  - Follow up in office in 6 week(s) with [facility].  - Pain medication prescription(s) sent for ibuprofen 600mg  - Questions answered. Information for the patient's :  Margarette Calero [[id number]]   , Spontaneous  Patient doing well without significant complaint. Voiding without difficulty, normal lochia. Passing flatus, no bowel movement yet. Ready for discharge home. Vitals:  Visit Vitals  /62 (BP 1 Location: Left arm, BP Patient Position: At rest)   Pulse 70   Temp 98.1 °F (36.7 °C)   Resp 16   Ht 5' 5\" (1.651 m)   Wt 86.2 kg (190 lb)   LMP 2022   SpO2 96%   Breastfeeding Yes   BMI 31.62 kg/m²     Temp (24hrs), Av.9 °F (36.6 °C), Min:97.4 °F (36.3 °C), Max:98.3 °F (36.8 °C)      Exam:        Patient without distress. Fundus firm, nontender per nursing fundal checks                Perineum with normal lochia noted per nursing assessment                Lower extremities are negative for pathological edema    Labs:     Lab Results   Component Value Date/Time    WBC 12.8 (H) 2023 12:11 AM    WBC 12.9 (H) 2022 11:49 PM    HGB 11.5 2023 12:11 AM    HGB 12.4 2022 11:49 PM    HCT 34.7 (L) 2023 12:11 AM    HCT 36.9 2022 11:49 PM    PLATELET 829  12:11 AM    PLATELET 978  11:49 PM       No results found for this or any previous visit (from the past 24 hour(s)). Home

## 2024-07-31 NOTE — ASU PATIENT PROFILE, ADULT - FALL HARM RISK - RISK INTERVENTIONS

## 2024-07-31 NOTE — CONSULT NOTE ADULT - SUBJECTIVE AND OBJECTIVE BOX
Surgeon: Dr. Merritt/Boyd/Rajat    Consult requesting by: MD Stanton    HISTORY OF PRESENT ILLNESS:      70 y/o male, current smoker, w/ PMHx of HTN, HLD, hypothyroidism, BPH, CKD (baseline Cr 1.3-1.4), who presented to his cardiologist with c/o SOB on walking uphill and 1-2 flights of stairs, relieved with rest.  Pt also c/o occasional dizziness with postural changes.  He underwent a CCTA on 7/12/24 which revealed a CA score of 722, short segment mixed predominantly dense calcified plaque proximal LAD resulting in moderate to severe narrowing, mixed calcified and noncalcified plaque proximal LCx resulting in moderate to severe narrowing, and mixed predominantly dense calcified plaque proximal and mid RCA resulting in moderate narrowing.  CT FFR 7/25/24 -> LAD 0.59, LCx , OM 0.82, RCA 0.82.   Patient presents today for [facility] with possible intervention if clinically indicated.      NYHA functional class    [ ] Class I (no limitation) [ ] Class II (slight limitation) [ ] Class III (marked limitation) [ ] Class IV (symptoms at rest)      CCS Grading of Angina Pectoris  [ ] Class I                    Angina only during strenuous pr prolonged physical activity  [ ] Class II                   Slight limitation, with anginaonly during vigorous physical activity  [ ] Class III                  Symptoms with everyday living activities, i.e. moderate limitation  [ ] Class IV                  Inability to perform any activity without angina or angina at rest, i.e. severe limitation      PAST MEDICAL & SURGICAL HISTORY:  Hypothyroidism, unspecified type      High cholesterol      HTN (hypertension)      BPH (benign prostatic hyperplasia)      Chronic kidney disease (CKD)      No significant past surgical history          MEDICATIONS  (STANDING):  chlorhexidine 4% Liquid 1 Application(s) Topical once  sodium chloride 0.9%. 1000 milliLiter(s) (100 mL/Hr) IV Continuous <Continuous>    MEDICATIONS  (PRN):    Antiplatelet therapy:                           Last dose/amt:    Home Medications:  Flomax 0.4 mg oral capsule: 1 cap(s) orally once a day (31 Jul 2024 09:22)  levothyroxine 88 mcg (0.088 mg) oral tablet: 1 tab(s) orally once a day (31 Jul 2024 09:22)  Metoprolol Succinate ER 25 mg oral tablet, extended release: 1 tab(s) orally once a day (31 Jul 2024 09:22)  Ranexa 500 mg oral tablet, extended release: 1 tab(s) orally 2 times a day (31 Jul 2024 09:22)  rosuvastatin 20 mg oral tablet: 1 tab(s) orally once a day (at bedtime) (31 Jul 2024 09:22)      Allergies    No Known Allergies    Intolerances        SOCIAL HISTORY:  Smoker: [X] Yes  [ ] No        PACK YEARS:                         WHEN QUIT?  ETOH use: [ ] Yes  [X] No              FREQUENCY / QUANTITY:  Illicit Drug use:  [ ] Yes  [X] No  Occupation: [employer]ies[employer]  Lives with: alone, apartment  Assisted device use: independent  5 meter walk test: unable to assess, on bedrest      FAMILY HISTORY:  No pertinent family history in first degree relatives        Review of Systems  CONSTITUTIONAL:  Fevers[ ] chills[ ] sweats[ ] fatigue[ ] weight loss[ ] weight gain [ ]                                     NEGATIVE [X ]   NEURO:  paresthesias[ ] seizures [ ]  syncope [ ]  confusion [ ]                                                                                NEGATIVE[ X]   EYES: glasses[ ]  blurry vision[ ]  discharge[ ] pain[ ] glaucoma [ ]                                                                          NEGATIVE[X ]   ENMT:  difficulty hearing [ ]  vertigo[ ]  dysphagia[ ] epistaxis[ ] recent dental work [ ]                                    NEGATIVE[ X]   CV:  chest pain[ ] palpitations[ ] CHATTERJEE [ ] diaphoresis [ ]                                                                                           NEGATIVE[ X]   RESPIRATORY:  wheezing[ ] SOB[ ] cough [ ] sputum[ ] hemoptysis[ ]                                                                  NEGATIVE[ ]   GI:  nausea[ ]  vomiting [ ]  diarrhea[ ] constipation [ ] melena [ ]                                                                         NEGATIVE[ X]   : hematuria[ ]  dysuria[ ] urgency[ ] incontinence[ ]                                                                                            NEGATIVE[ X]   MUSKULOSKELETAL:  arthritis[ ]  joint swelling [ ] muscle weakness [ ] Hx vein stripping [ ]                             NEGATIVE[X ]   SKIN/BREAST:  rash[ ] itching [ ]  hair loss[ ] masses[ ]                                                                                              NEGATIVE[ X]   PSYCH:  dementia [ ] depression [ ] anxiety[ ]                                                                                                               NEGATIVE[X ]   HEME/LYMPH:  bruises easily[ ] enlarged lymph nodes[ ] tender lymph nodes[ ]                                               NEGATIVE[ X]   ENDOCRINE:  cold intolerance[ ] heat intolerance[ ] polydipsia[ ]                                                                          NEGATIVE[ X]     PHYSICAL EXAM  Vital Signs Last 24 Hrs  T(C): 36.3 (31 Jul 2024 09:16), Max: 36.3 (31 Jul 2024 09:16)  T(F): 97.4 (31 Jul 2024 09:16), Max: 97.4 (31 Jul 2024 09:16)  HR: 64 (31 Jul 2024 14:45) (64 - 65)  BP: 137/99 (31 Jul 2024 14:45) (137/99 - 149/88)  RR: 14 (31 Jul 2024 14:45) (14 - 14)  SpO2: 99% (31 Jul 2024 14:45) (97% - 99%)      Right arm bp: unable to assess, D STAT                                Left arm bp;    CONSTITUTIONAL:  WNL[ ]   Neuro: WNL [ ] Normal exam oriented to person/place & time with no focal motor or sensory  deficits. Other                     Eyes:    WNL [ ] Normal exam of conjunctiva & lids, pupils equally reactive. Other     ENT:     WNL [ ] Normal exam of nasal/oral mucosa with absence of cyanosis. Other  Neck:   WNL [ ] Normal exam of jugular veins, trachea & thyroid. Other  Chest:  WNL [ ] Normal lung exam with good air movement absence of wheezes, rales, or rhonchi: Other                                                                                CV:  Auscultation: normal [ ] S3[ ] S4[ ] Irregular [ ] Rub[ ] Clicks[ ]    Murmurs none:[ ]systolic [ ]  diastolic [ ] holosystolic [ ]  Carotids: No Bruits[ ] Other                  Abdominal Aorta: normal [ ] nonpalpable[ ]Other                                                                                      GI: WNL[ ] Normal exam of abdomen, liver & spleen with no noted masses or tenderness. Other                                                                                                        Extremities: WNL[ ] Normal no evidence of cyanosis or deformity Edema: none[ ]trace[ ]1+[ ]2+[ ]3+[ ]4+[ ]  Lower Extremity Pulses: Right[ ] Left[ ]Varicosities[ ]  SKIN :WNL[ ] Normal exam to inspection & palpation. Other:                                                          LABS:                        12.3   5.62  )-----------( 200      ( 31 Jul 2024 09:25 )             38.0     07-31    145  |  111<H>  |  22<H>  ----------------------------<  76  4.5   |  23  |  1.0    Ca    8.4      31 Jul 2024 09:25      PT/INR - ( 31 Jul 2024 16:28 )   PT: 12.40 sec;   INR: 1.09 ratio           Urinalysis Basic - ( 31 Jul 2024 09:25 )    Color: x / Appearance: x / SG: x / pH: x  Gluc: 76 mg/dL / Ketone: x  / Bili: x / Urobili: x   Blood: x / Protein: x / Nitrite: x   Leuk Esterase: x / RBC: x / WBC x   Sq Epi: x / Non Sq Epi: x / Bacteria: x      Cardiac Cath:  FINDINGS:     Coronary Dominance: Right dominate      LM: Mild calcified disease distally    LAD: 80% ostial LAD calcified disease. 80% lesion distal LAD    CX: 80% stenosis OM1, 80% stenosis distal Lcx, OM3  with distal collaterals from RPL  RCA: Large dominate vessel. 80% lesion Mid RCA (FFR 0.82, delta 0.12)        TTE / SHAWNA: ordered, results pending    STS Score:     Impression:  CAD [ ]  Valvular  disease [ ]   Aortic Disease [ ]   HELENE: Yes[ ] No [ ]   CKD Stage I [ ] , Stage II [ ] , Stage III [ ], Stage IV [ ]   Anemia: Yes [ ], No [ ]  Diabetes :Yes [ ], No [ ]  Acute MI: Yes [ ], No [ ]   Heart Failure: Yes [ ] , No [ ] HFpEF [ ], HFrEF [ ]      Assessment/ Plan: 69y Male with...  -Case and plan discussed with CT surgeon Dr. Merritt/Boyd/Rajat. Initial STS risk assessed and discussed with patient. Evaluation by full heart team pending. Attending note to follow. Pre-op for:     Recommendations:  [] hold Plavix  [] hold ASA if Pre-op Cardiac Valve surgery and patient without CAD  [] hold ACEI/ARB/CCB 24 hours prior to planned procedure   [] LUE/RUE precaution for possible radial artery harvest      Labs:  [] CBC  [] CMP  [] PT/INR/PTT  [] BNP  [] HgA1c  [] Type and screen  [] Urinalysis  [] MRSA  [] COVID pcr    Diagnostic studies  [] CT HEAD Non-Contrast  [] CT Chest without/with contrast   [] Carotid Duplex  [] PFT: Simple PFT [ ]  Full [ ]  [] DOMINIK/PVR  [] TTE    Consultations/Evaluations   [] Renal Consult  [] Pulmonary Consult  [] Vascular Consult  [] Dental Consult   [] Hem-Onc Consult   [] GI Consult   [] Other Consultations :                 Surgeon: Dr. Merritt/Boyd/Rajat    Consult requesting by: MD Stanton    HISTORY OF PRESENT ILLNESS:    Patient is a 69 year-old current smoker with a past medical history of HTN, HLD. hypothyroidism, BPH, CKD (baseline Cr 1.3-1.4) presented today for an elective cardiac catheterization. Patient states for several months he felt worsening shortness of breath when walking uphill and 1-2 flights of stairs, relieved with rest.        presented to his cardiologist with c/o SOB on walking uphill and 1-2 flights of stairs, relieved with rest.  Pt also c/o occasional dizziness with postural changes.  He underwent a CCTA on 7/12/24 which revealed a CA score of 722, short segment mixed predominantly dense calcified plaque proximal LAD resulting in moderate to severe narrowing, mixed calcified and noncalcified plaque proximal LCx resulting in moderate to severe narrowing, and mixed predominantly dense calcified plaque proximal and mid RCA resulting in moderate narrowing.  CT FFR 7/25/24 -> LAD 0.59, LCx , OM 0.82, RCA 0.82.   Patient presents today for [facility] with possible intervention if clinically indicated.      NYHA functional class    [ ] Class I (no limitation) [ ] Class II (slight limitation) [ ] Class III (marked limitation) [ ] Class IV (symptoms at rest)      CCS Grading of Angina Pectoris  [ ] Class I                    Angina only during strenuous pr prolonged physical activity  [ ] Class II                   Slight limitation, with anginaonly during vigorous physical activity  [ ] Class III                  Symptoms with everyday living activities, i.e. moderate limitation  [ ] Class IV                  Inability to perform any activity without angina or angina at rest, i.e. severe limitation      PAST MEDICAL & SURGICAL HISTORY:  Hypothyroidism, unspecified type      High cholesterol      HTN (hypertension)      BPH (benign prostatic hyperplasia)      Chronic kidney disease (CKD)      No significant past surgical history          MEDICATIONS  (STANDING):  chlorhexidine 4% Liquid 1 Application(s) Topical once  sodium chloride 0.9%. 1000 milliLiter(s) (100 mL/Hr) IV Continuous <Continuous>    MEDICATIONS  (PRN):    Antiplatelet therapy:                           Last dose/amt:    Home Medications:  Flomax 0.4 mg oral capsule: 1 cap(s) orally once a day (31 Jul 2024 09:22)  levothyroxine 88 mcg (0.088 mg) oral tablet: 1 tab(s) orally once a day (31 Jul 2024 09:22)  Metoprolol Succinate ER 25 mg oral tablet, extended release: 1 tab(s) orally once a day (31 Jul 2024 09:22)  Ranexa 500 mg oral tablet, extended release: 1 tab(s) orally 2 times a day (31 Jul 2024 09:22)  rosuvastatin 20 mg oral tablet: 1 tab(s) orally once a day (at bedtime) (31 Jul 2024 09:22)      Allergies    No Known Allergies    Intolerances        SOCIAL HISTORY:  Smoker: [X] Yes  [ ] No        PACK YEARS:                         WHEN QUIT?  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  Lives with: alone, apartment  Assisted device use: independent  5 meter walk test: unable to assess, on bedrest      FAMILY HISTORY:  No pertinent family history in first degree relatives        Review of Systems  CONSTITUTIONAL:  Fevers[ ] chills[ ] sweats[ ] fatigue[ ] weight loss[ ] weight gain [ ]                                     NEGATIVE [X ]   NEURO:  paresthesias[ ] seizures [ ]  syncope [ ]  confusion [ ]                                                                                NEGATIVE[ X]   EYES: glasses[ ]  blurry vision[ ]  discharge[ ] pain[ ] glaucoma [ ]                                                                          NEGATIVE[X ]   ENMT:  difficulty hearing [ ]  vertigo[ ]  dysphagia[ ] epistaxis[ ] recent dental work [ ]                                    NEGATIVE[ X]   CV:  chest pain[ ] palpitations[ ] CHATTERJEE [ ] diaphoresis [ ]                                                                                           NEGATIVE[ X]   RESPIRATORY:  wheezing[ ] SOB[ ] cough [ ] sputum[ ] hemoptysis[ ]                                                                  NEGATIVE[ ]   GI:  nausea[ ]  vomiting [ ]  diarrhea[ ] constipation [ ] melena [ ]                                                                         NEGATIVE[ X]   : hematuria[ ]  dysuria[ ] urgency[ ] incontinence[ ]                                                                                            NEGATIVE[ X]   MUSKULOSKELETAL:  arthritis[ ]  joint swelling [ ] muscle weakness [ ] Hx vein stripping [ ]                             NEGATIVE[X ]   SKIN/BREAST:  rash[ ] itching [ ]  hair loss[ ] masses[ ]                                                                                              NEGATIVE[ X]   PSYCH:  dementia [ ] depression [ ] anxiety[ ]                                                                                                               NEGATIVE[X ]   HEME/LYMPH:  bruises easily[ ] enlarged lymph nodes[ ] tender lymph nodes[ ]                                               NEGATIVE[ X]   ENDOCRINE:  cold intolerance[ ] heat intolerance[ ] polydipsia[ ]                                                                          NEGATIVE[ X]     PHYSICAL EXAM  Vital Signs Last 24 Hrs  T(C): 36.3 (31 Jul 2024 09:16), Max: 36.3 (31 Jul 2024 09:16)  T(F): 97.4 (31 Jul 2024 09:16), Max: 97.4 (31 Jul 2024 09:16)  HR: 64 (31 Jul 2024 14:45) (64 - 65)  BP: 137/99 (31 Jul 2024 14:45) (137/99 - 149/88)  RR: 14 (31 Jul 2024 14:45) (14 - 14)  SpO2: 99% (31 Jul 2024 14:45) (97% - 99%)      Right arm bp: unable to assess, D STAT                                Left arm bp;    CONSTITUTIONAL:  WNL[ ]   Neuro: WNL [ ] Normal exam oriented to person/place & time with no focal motor or sensory  deficits. Other                     Eyes:    WNL [ ] Normal exam of conjunctiva & lids, pupils equally reactive. Other     ENT:     WNL [ ] Normal exam of nasal/oral mucosa with absence of cyanosis. Other  Neck:   WNL [ ] Normal exam of jugular veins, trachea & thyroid. Other  Chest:  WNL [ ] Normal lung exam with good air movement absence of wheezes, rales, or rhonchi: Other                                                                                CV:  Auscultation: normal [ ] S3[ ] S4[ ] Irregular [ ] Rub[ ] Clicks[ ]    Murmurs none:[ ]systolic [ ]  diastolic [ ] holosystolic [ ]  Carotids: No Bruits[ ] Other                  Abdominal Aorta: normal [ ] nonpalpable[ ]Other                                                                                      GI: WNL[ ] Normal exam of abdomen, liver & spleen with no noted masses or tenderness. Other                                                                                                        Extremities: WNL[ ] Normal no evidence of cyanosis or deformity Edema: none[ ]trace[ ]1+[ ]2+[ ]3+[ ]4+[ ]  Lower Extremity Pulses: Right[ ] Left[ ]Varicosities[ ]  SKIN :WNL[ ] Normal exam to inspection & palpation. Other:                                                          LABS:                        12.3   5.62  )-----------( 200      ( 31 Jul 2024 09:25 )             38.0     07-31    145  |  111<H>  |  22<H>  ----------------------------<  76  4.5   |  23  |  1.0    Ca    8.4      31 Jul 2024 09:25      PT/INR - ( 31 Jul 2024 16:28 )   PT: 12.40 sec;   INR: 1.09 ratio           Urinalysis Basic - ( 31 Jul 2024 09:25 )    Color: x / Appearance: x / SG: x / pH: x  Gluc: 76 mg/dL / Ketone: x  / Bili: x / Urobili: x   Blood: x / Protein: x / Nitrite: x   Leuk Esterase: x / RBC: x / WBC x   Sq Epi: x / Non Sq Epi: x / Bacteria: x      Cardiac Cath:  FINDINGS:     Coronary Dominance: Right dominate      LM: Mild calcified disease distally    LAD: 80% ostial LAD calcified disease. 80% lesion distal LAD    CX: 80% stenosis OM1, 80% stenosis distal Lcx, OM3  with distal collaterals from RPL  RCA: Large dominate vessel. 80% lesion Mid RCA (FFR 0.82, delta 0.12)        TTE / SHAWNA: ordered, results pending    STS Score:     Impression:  CAD [ ]  Valvular  disease [ ]   Aortic Disease [ ]   HELENE: Yes[ ] No [ ]   CKD Stage I [ ] , Stage II [ ] , Stage III [ ], Stage IV [ ]   Anemia: Yes [ ], No [ ]  Diabetes :Yes [ ], No [ ]  Acute MI: Yes [ ], No [ ]   Heart Failure: Yes [ ] , No [ ] HFpEF [ ], HFrEF [ ]      Assessment/ Plan: 69y Male with...  -Case and plan discussed with CT surgeon Dr. Merritt/Boyd/Rajat. Initial STS risk assessed and discussed with patient. Evaluation by full heart team pending. Attending note to follow. Pre-op for:     Recommendations:  [] hold Plavix  [] hold ASA if Pre-op Cardiac Valve surgery and patient without CAD  [] hold ACEI/ARB/CCB 24 hours prior to planned procedure   [] LUE/RUE precaution for possible radial artery harvest      Labs:  [] CBC  [] CMP  [] PT/INR/PTT  [] BNP  [] HgA1c  [] Type and screen  [] Urinalysis  [] MRSA  [] COVID pcr    Diagnostic studies  [] CT HEAD Non-Contrast  [] CT Chest without/with contrast   [] Carotid Duplex  [] PFT: Simple PFT [ ]  Full [ ]  [] DOMINIK/PVR  [] TTE    Consultations/Evaluations   [] Renal Consult  [] Pulmonary Consult  [] Vascular Consult  [] Dental Consult   [] Hem-Onc Consult   [] GI Consult   [] Other Consultations :                 Surgeon: Dr. Merritt/Boyd/Rajat    Consult requesting by: MD Stanton    HISTORY OF PRESENT ILLNESS:    Patient is a 69 year-old current smoker with a past medical history of HTN, HLD. hypothyroidism, BPH, CKD (baseline Cr 1.3-1.4) presented today for an elective cardiac catheterization. Patient states for several months he felt worsening shortness of breath when walking uphill and 1-2 flights of stairs, relieved with rest. Patient also complains of occasional dizziness with postural changes. Patient saw a cardiologist who recommended a CCTA. On 7/12/24, he underwent a CCTA, revealing a score of 722, short segment mixed predominantly dense calcified plaque proximal LAD resulting in moderate to severe narrowing, mixed calcified and noncalcified plaque proximal LCx resulting in moderate to severe narrowing, and mixed predominantly dense calcified plaque proximal and mid RCA resulting in moderate narrowing. After reviewing the results, the Cardiologist recommended the patient undergo a diagnostic cardiac catheterization. Today, patient underwent a cardiac catheterization via right radial artery, revealing 3vCAD. CT Surgery was consulted for an evaluation of myocardial revascularization via bypass grafts.     Healthcare Providers:   PCP: MD Marcial  Cardiology: MD Heard  Pulmonary: MD Gaytan        NYHA functional class    [ ] Class I (no limitation) [X] Class II (slight limitation) [ ] Class III (marked limitation) [ ] Class IV (symptoms at rest)      CCS Grading of Angina Pectoris  [X] Class I                    Angina only during strenuous pr prolonged physical activity  [ ] Class II                   Slight limitation, with anginaonly during vigorous physical activity  [ ] Class III                  Symptoms with everyday living activities, i.e. moderate limitation  [ ] Class IV                  Inability to perform any activity without angina or angina at rest, i.e. severe limitation      PAST MEDICAL & SURGICAL HISTORY:  Hypothyroidism, unspecified type      High cholesterol      HTN (hypertension)      BPH (benign prostatic hyperplasia)      Chronic kidney disease (CKD)      No significant past surgical history          MEDICATIONS  (STANDING):  chlorhexidine 4% Liquid 1 Application(s) Topical once  sodium chloride 0.9%. 1000 milliLiter(s) (100 mL/Hr) IV Continuous <Continuous>    MEDICATIONS  (PRN):      Home Medications:  Flomax 0.4 mg oral capsule: 1 cap(s) orally once a day (31 Jul 2024 09:22)  levothyroxine 88 mcg (0.088 mg) oral tablet: 1 tab(s) orally once a day (31 Jul 2024 09:22)  Metoprolol Succinate ER 25 mg oral tablet, extended release: 1 tab(s) orally once a day (31 Jul 2024 09:22)  Ranexa 500 mg oral tablet, extended release: 1 tab(s) orally 2 times a day (31 Jul 2024 09:22)  rosuvastatin 20 mg oral tablet: 1 tab(s) orally once a day (at bedtime) (31 Jul 2024 09:22)      Allergies    No Known Allergies    Intolerances        SOCIAL HISTORY:  Smoker: [X] Yes  [ ] No        PACK YEARS:                         WHEN QUIT?  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  Lives with: alone, apartment  Assisted device use: independent  5 meter walk test: unable to assess, on bedrest      FAMILY HISTORY:  No pertinent family history in first degree relatives        Review of Systems  CONSTITUTIONAL:  Fevers[ ] chills[ ] sweats[ ] fatigue[ ] weight loss[ ] weight gain [ ]                                     NEGATIVE [X ]   NEURO:  paresthesias[ ] seizures [ ]  syncope [ ]  confusion [ ]                                                                                NEGATIVE[ X]   EYES: glasses[ ]  blurry vision[ ]  discharge[ ] pain[ ] glaucoma [ ]                                                                          NEGATIVE[X ]   ENMT:  difficulty hearing [ ]  vertigo[ ]  dysphagia[ ] epistaxis[ ] recent dental work [ ]                                    NEGATIVE[ X]   CV:  chest pain[ ] palpitations[ ] CHATTERJEE [ ] diaphoresis [ ]                                                                                           NEGATIVE[ X]   RESPIRATORY:  wheezing[ ] SOB[X] cough [ ] sputum[ ] hemoptysis[ ]                                                                  NEGATIVE[ ]   GI:  nausea[ ]  vomiting [ ]  diarrhea[ ] constipation [ ] melena [ ]                                                                         NEGATIVE[ X]   : hematuria[ ]  dysuria[ ] urgency[ ] incontinence[ ]                                                                                            NEGATIVE[ X]   MUSKULOSKELETAL:  arthritis[ ]  joint swelling [ ] muscle weakness [ ] Hx vein stripping [ ]                             NEGATIVE[X ]   SKIN/BREAST:  rash[ ] itching [ ]  hair loss[ ] masses[ ]                                                                                              NEGATIVE[ X]   PSYCH:  dementia [ ] depression [ ] anxiety[ ]                                                                                                               NEGATIVE[X ]   HEME/LYMPH:  bruises easily[ ] enlarged lymph nodes[ ] tender lymph nodes[ ]                                               NEGATIVE[ X]   ENDOCRINE:  cold intolerance[ ] heat intolerance[ ] polydipsia[ ]                                                                          NEGATIVE[ X]     PHYSICAL EXAM  Vital Signs Last 24 Hrs  T(C): 36.3 (31 Jul 2024 09:16), Max: 36.3 (31 Jul 2024 09:16)  T(F): 97.4 (31 Jul 2024 09:16), Max: 97.4 (31 Jul 2024 09:16)  HR: 64 (31 Jul 2024 14:45) (64 - 65)  BP: 137/99 (31 Jul 2024 14:45) (137/99 - 149/88)  RR: 14 (31 Jul 2024 14:45) (14 - 14)  SpO2: 99% (31 Jul 2024 14:45) (97% - 99%)      Right arm bp: unable to assess, D STAT                                Left arm bp;    CONSTITUTIONAL:  WNL[X]   Neuro: WNL [X] Normal exam oriented to person/place & time with no focal motor or sensory  deficits. Other                     Eyes:    WNL [X] Normal exam of conjunctiva & lids, pupils equally reactive. Other     ENT:     WNL [X] Normal exam of nasal/oral mucosa with absence of cyanosis. Other  Neck:   WNL [X] Normal exam of jugular veins, trachea & thyroid. Other  Chest:  WNL [X] Normal lung exam with good air movement absence of wheezes, rales, or rhonchi: Other                                                                                CV:  Auscultation: normal [X] S3[ ] S4[ ] Irregular [ ] Rub[ ] Clicks[ ]    Murmurs none:[X]systolic [ ]  diastolic [ ] holosystolic [ ]  Carotids: No Bruits[ X] Other                  Abdominal Aorta: normal [X] nonpalpable[ ]Other                                                                                      GI: WNL[X] Normal exam of abdomen, liver & spleen with no noted masses or tenderness. Other                                                                                                        Extremities: WNL[X] Normal no evidence of cyanosis or deformity Edema: none[ ]trace[ ]1+[ ]2+[ ]3+[ ]4+[ ]  Lower Extremity Pulses: Right[X] Left[X]Varicosities[ ]  SKIN :WNL[X] Normal exam to inspection & palpation. Other:                                                          LABS:                        12.3   5.62  )-----------( 200      ( 31 Jul 2024 09:25 )             38.0     07-31    145  |  111<H>  |  22<H>  ----------------------------<  76  4.5   |  23  |  1.0    Ca    8.4      31 Jul 2024 09:25      PT/INR - ( 31 Jul 2024 16:28 )   PT: 12.40 sec;   INR: 1.09 ratio           Urinalysis Basic - ( 31 Jul 2024 09:25 )    Color: x / Appearance: x / SG: x / pH: x  Gluc: 76 mg/dL / Ketone: x  / Bili: x / Urobili: x   Blood: x / Protein: x / Nitrite: x   Leuk Esterase: x / RBC: x / WBC x   Sq Epi: x / Non Sq Epi: x / Bacteria: x      Cardiac Cath:  FINDINGS:     Coronary Dominance: Right dominate      LM: Mild calcified disease distally    LAD: 80% ostial LAD calcified disease. 80% lesion distal LAD    CX: 80% stenosis OM1, 80% stenosis distal Lcx, OM3  with distal collaterals from RPL  RCA: Large dominate vessel. 80% lesion Mid RCA (FFR 0.82, delta 0.12)        TTE / SHAWNA: ordered, results pending    STS Score:     Impression:  CAD [X]  Valvular  disease [ ]   Aortic Disease [ ]   HELENE: Yes[] No [X]   CKD Stage I [X] , Stage II [ ] , Stage III [ ], Stage IV [ ]   Anemia: Yes [X], No []  Diabetes :Yes [ ], No [X]  Acute MI: Yes [ ], No [X]   Heart Failure: Yes [ ] , No [X] HFpEF [ ], HFrEF [ ]        Assessment/ Plan:     Patient is a 69 year-old current smoker with a past medical history of HTN, HLD. hypothyroidism, BPH, CKD (baseline Cr 1.3-1.4) presented today for an elective cardiac catheterization. Patient states for several months he felt worsening shortness of breath when walking uphill and 1-2 flights of stairs, relieved with rest. Patient also complains of occasional dizziness with postural changes. Patient saw a cardiologist who recommended a CCTA. On 7/12/24, he underwent a CCTA, revealing a score of 722, short segment mixed predominantly dense calcified plaque proximal LAD resulting in moderate to severe narrowing, mixed calcified and noncalcified plaque proximal LCx resulting in moderate to severe narrowing, and mixed predominantly dense calcified plaque proximal and mid RCA resulting in moderate narrowing. After reviewing the results, the Cardiologist recommended the patient undergo a diagnostic cardiac catheterization. Today, patient underwent a cardiac catheterization via right radial artery, revealing 3vCAD. CT Surgery was consulted for an evaluation of myocardial revascularization via bypass grafts.     -Case and plan discussed with CT surgeon Dr. Merritt/Boyd/Rajat. Initial STS risk assessed and discussed with patient. Evaluation by full heart team pending. Attending note to follow. Pre-op for CABG on Friday    Recommendations:  [X] hold Plavix  [] hold ASA if Pre-op Cardiac Valve surgery and patient without CAD  [X] hold ACEI/ARB/CCB 24 hours prior to planned procedure   [] LUE/RUE precaution for possible radial artery harvest      Labs:  [X] CBC  [X] CMP  [X] PT/INR/PTT  [X] BNP  [X] HgA1c  [X] Type and screen  [X] Urinalysis  [X] MRSA  [] COVID pcr    Diagnostic studies  [] CT HEAD Non-Contrast  [X] CT Chest without contrast   [X] Carotid Duplex  [X] PFT: Simple PFT [X]  Full [ ]  [] DOMINIK/PVR  [] TTE    Consultations/Evaluations   [] Renal Consult  [] Pulmonary Consult  [] Vascular Consult  [] Dental Consult   [] Hem-Onc Consult   [] GI Consult   [] Other Consultations :

## 2024-07-31 NOTE — ASU DISCHARGE PLAN (ADULT/PEDIATRIC) - ASU DC SPECIAL INSTRUCTIONSFT
Discharge Instructions as follows:  - Continue medical regimen as prescribed to prevent chest pain.  - Continue beta blocker, Statin, Ranexa  - Instructed to call 911 if chest pain, shortness of breath or bleeding from access site.  - No heavy lifting > 10lbs x 1 week.  - No driving x 24 hours.  - No baths, swimming pools x 1 week, may shower  - Low sodium low fat low cholesterol diet  - Follow-up with Cardiologist in 1-2 weeks after discharge  - Soreness or tenderness at the site is possible, it will diminish over time. You may take Tylenol every 4-6 hours as needed. Nothing stronger is needed. NO Motrin/Ibuprofen  - Any questions call cardiac cath lab [phone]  -Follow up with CT surgery

## 2024-07-31 NOTE — ASU DISCHARGE PLAN (ADULT/PEDIATRIC) - NS MD DC FALL RISK RISK
For information on Fall & Injury Prevention, visit: [email or web address] OR  [email or web address] OR  https://www.cdc.gov/steadi/patient.html

## 2024-07-31 NOTE — CONSULT NOTE ADULT - NS ATTEND AMEND GEN_ALL_CORE FT
The patient is a 69-year-old gentleman we were asked to evaluate following his cardiac catheterization that showed severe triple-vessel coronary artery disease.    The patient tells me that he has been a lifelong smoker and has more than a 50-pack-year history of smoking.  He had been complaining of shortness of breath with dyspnea on exertion with anginal symptoms that had been ongoing for the last few months and recently he had noticed occasional bouts of dizziness and lightheadedness.    He was investigated for the same and was found to have a very high calcium score and CT FFR positive coronary artery disease and was sent in for a cardiac catheterization which is described above.    His past history significant for hypertension and hypercholesterolemia and he is known to have chronic kidney disease with a baseline creatinine of 1.4 and has a history of benign prostatic hypertrophy and hypothyroidism but tells me he does not take medications regularly.    I had a chance to review his cardiac catheterization as described and offered the patient open heart surgery with coronary artery bypass grafting and we discussed the risks, benefits and alternatives to the same.  I also had the opportunity to discuss them with his daughter, Elayne Trevizo, telephone #[phone].  Elayne lives in [place] and is the patient's healthcare proxy.    The patient wants to think about his options but wants to stay and have the surgery done and we will complete his preoperative workup and plan accordingly and discuss the surgery once again and get consent when the patient is ready.

## 2024-07-31 NOTE — PATIENT PROFILE ADULT - FALL HARM RISK - UNIVERSAL INTERVENTIONS
Bed in lowest position, wheels locked, appropriate side rails in place/Call bell, personal items and telephone in reach/Instruct patient to call for assistance before getting out of bed or chair/Non-slip footwear when patient is out of bed/[place] to call system/Physically safe environment - no spills, clutter or unnecessary equipment/Purposeful Proactive Rounding/Room/bathroom lighting operational, light cord in reach

## 2024-08-01 ENCOUNTER — RESULT REVIEW (OUTPATIENT)
Age: 69
End: 2024-08-01

## 2024-08-01 LAB
ALBUMIN SERPL ELPH-MCNC: 3.9 G/DL — SIGNIFICANT CHANGE UP (ref 3.5–5.2)
ALP SERPL-CCNC: 64 U/L — SIGNIFICANT CHANGE UP (ref 30–115)
ALT FLD-CCNC: 14 U/L — SIGNIFICANT CHANGE UP (ref 0–41)
ANION GAP SERPL CALC-SCNC: 12 MMOL/L — SIGNIFICANT CHANGE UP (ref 7–14)
APPEARANCE UR: CLEAR — SIGNIFICANT CHANGE UP
AST SERPL-CCNC: 16 U/L — SIGNIFICANT CHANGE UP (ref 0–41)
BACTERIA # UR AUTO: ABNORMAL /HPF
BILIRUB SERPL-MCNC: 0.4 MG/DL — SIGNIFICANT CHANGE UP (ref 0.2–1.2)
BILIRUB UR-MCNC: NEGATIVE — SIGNIFICANT CHANGE UP
BLD GP AB SCN SERPL QL: SIGNIFICANT CHANGE UP
BUN SERPL-MCNC: 22 MG/DL — HIGH (ref 10–20)
CALCIUM SERPL-MCNC: 8.6 MG/DL — SIGNIFICANT CHANGE UP (ref 8.4–10.5)
CAST: 1 /LPF — SIGNIFICANT CHANGE UP (ref 0–4)
CHLORIDE SERPL-SCNC: 107 MMOL/L — SIGNIFICANT CHANGE UP (ref 98–110)
CO2 SERPL-SCNC: 21 MMOL/L — SIGNIFICANT CHANGE UP (ref 17–32)
COLOR SPEC: YELLOW — SIGNIFICANT CHANGE UP
CREAT SERPL-MCNC: 0.9 MG/DL — SIGNIFICANT CHANGE UP (ref 0.7–1.5)
DIFF PNL FLD: ABNORMAL
EGFR: 94 ML/MIN/1.73M2 — SIGNIFICANT CHANGE UP
GLUCOSE SERPL-MCNC: 78 MG/DL — SIGNIFICANT CHANGE UP (ref 70–99)
GLUCOSE UR QL: NEGATIVE MG/DL — SIGNIFICANT CHANGE UP
HCT VFR BLD CALC: 37 % — LOW (ref 42–52)
HGB BLD-MCNC: 11.9 G/DL — LOW (ref 14–18)
KETONES UR-MCNC: NEGATIVE MG/DL — SIGNIFICANT CHANGE UP
LEUKOCYTE ESTERASE UR-ACNC: ABNORMAL
MAGNESIUM SERPL-MCNC: 2.3 MG/DL — SIGNIFICANT CHANGE UP (ref 1.8–2.4)
MCHC RBC-ENTMCNC: 29.8 PG — SIGNIFICANT CHANGE UP (ref 27–31)
MCHC RBC-ENTMCNC: 32.2 G/DL — SIGNIFICANT CHANGE UP (ref 32–37)
MCV RBC AUTO: 92.7 FL — SIGNIFICANT CHANGE UP (ref 80–94)
NITRITE UR-MCNC: POSITIVE
NRBC # BLD: 0 /100 WBCS — SIGNIFICANT CHANGE UP (ref 0–0)
PH UR: 5.5 — SIGNIFICANT CHANGE UP (ref 5–8)
PLATELET # BLD AUTO: 183 K/UL — SIGNIFICANT CHANGE UP (ref 130–400)
PMV BLD: 10 FL — SIGNIFICANT CHANGE UP (ref 7.4–10.4)
POTASSIUM SERPL-MCNC: 4 MMOL/L — SIGNIFICANT CHANGE UP (ref 3.5–5)
POTASSIUM SERPL-SCNC: 4 MMOL/L — SIGNIFICANT CHANGE UP (ref 3.5–5)
PROT SERPL-MCNC: 6.8 G/DL — SIGNIFICANT CHANGE UP (ref 6–8)
PROT UR-MCNC: SIGNIFICANT CHANGE UP MG/DL
RBC # BLD: 3.99 M/UL — LOW (ref 4.7–6.1)
RBC # FLD: 14.3 % — SIGNIFICANT CHANGE UP (ref 11.5–14.5)
RBC CASTS # UR COMP ASSIST: 0 /HPF — SIGNIFICANT CHANGE UP (ref 0–4)
SODIUM SERPL-SCNC: 140 MMOL/L — SIGNIFICANT CHANGE UP (ref 135–146)
SP GR SPEC: 1.03 — SIGNIFICANT CHANGE UP (ref 1–1.03)
SQUAMOUS # UR AUTO: 0 /HPF — SIGNIFICANT CHANGE UP (ref 0–5)
UROBILINOGEN FLD QL: 1 MG/DL — SIGNIFICANT CHANGE UP (ref 0.2–1)
WBC # BLD: 5.57 K/UL — SIGNIFICANT CHANGE UP (ref 4.8–10.8)
WBC # FLD AUTO: 5.57 K/UL — SIGNIFICANT CHANGE UP (ref 4.8–10.8)
WBC UR QL: 208 /HPF — HIGH (ref 0–5)

## 2024-08-01 PROCEDURE — 93010 ELECTROCARDIOGRAM REPORT: CPT

## 2024-08-01 PROCEDURE — 93306 TTE W/DOPPLER COMPLETE: CPT | Mod: 26

## 2024-08-01 PROCEDURE — 99232 SBSQ HOSP IP/OBS MODERATE 35: CPT | Mod: 57

## 2024-08-01 RX ORDER — LACTULOSE 10 G/15 ML
20 SOLUTION, ORAL ORAL ONCE
Refills: 0 | Status: COMPLETED | OUTPATIENT
Start: 2024-08-01 | End: 2024-08-01

## 2024-08-01 RX ORDER — CHLORHEXIDINE GLUCONATE 500 MG/1
1 CLOTH TOPICAL ONCE
Refills: 0 | Status: COMPLETED | OUTPATIENT
Start: 2024-08-02 | End: 2024-08-02

## 2024-08-01 RX ORDER — ALBUMIN HUMAN 25 %
3000 INTRAVENOUS SOLUTION INTRAVENOUS ONCE
Refills: 0 | Status: DISCONTINUED | OUTPATIENT
Start: 2024-08-02 | End: 2024-08-02

## 2024-08-01 RX ORDER — CHLORHEXIDINE GLUCONATE 500 MG/1
15 CLOTH TOPICAL ONCE
Refills: 0 | Status: COMPLETED | OUTPATIENT
Start: 2024-08-02 | End: 2024-08-02

## 2024-08-01 RX ORDER — CHLORHEXIDINE GLUCONATE 500 MG/1
1 CLOTH TOPICAL ONCE
Refills: 0 | Status: COMPLETED | OUTPATIENT
Start: 2024-08-01 | End: 2024-08-01

## 2024-08-01 RX ORDER — CHLORHEXIDINE GLUCONATE 500 MG/1
1 CLOTH TOPICAL ONCE
Refills: 0 | Status: DISCONTINUED | OUTPATIENT
Start: 2024-08-01 | End: 2024-08-02

## 2024-08-01 RX ADMIN — Medication 88 MICROGRAM(S): at 06:32

## 2024-08-01 RX ADMIN — Medication 3 MILLILITER(S): at 06:16

## 2024-08-01 RX ADMIN — RANOLAZINE 500 MILLIGRAM(S): 1000 TABLET, FILM COATED, EXTENDED RELEASE ORAL at 06:31

## 2024-08-01 RX ADMIN — Medication 12.5 MILLIGRAM(S): at 06:31

## 2024-08-01 RX ADMIN — PANTOPRAZOLE SODIUM 40 MILLIGRAM(S): 20 TABLET, DELAYED RELEASE ORAL at 06:31

## 2024-08-01 RX ADMIN — Medication 12.5 MILLIGRAM(S): at 17:59

## 2024-08-01 RX ADMIN — Medication 0.4 MILLIGRAM(S): at 21:15

## 2024-08-01 RX ADMIN — HEPARIN SODIUM 5000 UNIT(S): 1000 INJECTION, SOLUTION INTRAVENOUS; SUBCUTANEOUS at 06:28

## 2024-08-01 RX ADMIN — Medication 81 MILLIGRAM(S): at 12:02

## 2024-08-01 RX ADMIN — HEPARIN SODIUM 5000 UNIT(S): 1000 INJECTION, SOLUTION INTRAVENOUS; SUBCUTANEOUS at 17:59

## 2024-08-01 RX ADMIN — CHLORHEXIDINE GLUCONATE 1 APPLICATION(S): 500 CLOTH TOPICAL at 21:15

## 2024-08-01 RX ADMIN — Medication 20 GRAM(S): at 12:03

## 2024-08-01 RX ADMIN — Medication 3 MILLILITER(S): at 21:18

## 2024-08-01 RX ADMIN — ATORVASTATIN CALCIUM 80 MILLIGRAM(S): 40 TABLET, FILM COATED ORAL at 21:15

## 2024-08-01 NOTE — PROGRESS NOTE ADULT - NS ATTEND AMEND GEN_ALL_CORE FT
Patient seen and examined as described    We admitted the patient following his cardiac catheterization yesterday which showed severe diffuse calcific triple-vessel coronary artery disease.    The patient has symptoms of unstable angina and decided to stay and undergo coronary artery revascularization with open heart surgery and coronary bypass grafting. I discussed the risks, benefits, and alternatives to the surgical procedure in detail.  The risks that we discussed included but were not limited to bleeding, infection, stroke, myocardial infarction, renal failure, multi-organ failure, death, etc. amongst others were discussed in detail.  All questions. were answered.  The patient and the family want to proceed with the high risk intervention.      I discussed this in detail with the patient and his healthcare proxy, his daughter Elayne Trevizo over the telephone.    As part of his workup we also noticed that the patient had severe bullous emphysema on the CT scan of his chest and his screening pulmonary function test showed an FEV1 of only 74% of predicted.    I had to spend a significant amount of time with the patient and his family as they had multiple questions and these were all answered.

## 2024-08-01 NOTE — PROGRESS NOTE ADULT - SUBJECTIVE AND OBJECTIVE BOX
Cardiac Surgery Pre-op Note:  CC: Patient is a 69y old  Male who presents with a chief complaint of     Referring Physician:                                                                                             Surgeon:  Procedure: (Date) (Procedure)    Allergies    No Known Allergies    Intolerances      HPI:    70 y/o male, current smoker, w/ PMHx of HTN, HLD, hypothyroidism, BPH, CKD (baseline Cr 1.3-1.4), who presented to his cardiologist with c/o SOB on walking uphill and 1-2 flights of stairs, relieved with rest.  Pt also c/o occasional dizziness with postural changes.  He underwent a CCTA on 7/12/24 which revealed a CA score of 722, short segment mixed predominantly dense calcified plaque proximal LAD resulting in moderate to severe narrowing, mixed calcified and noncalcified plaque proximal LCx resulting in moderate to severe narrowing, and mixed predominantly dense calcified plaque proximal and mid RCA resulting in moderate narrowing.  CT FFR 7/25/24 -> LAD 0.59, LCx , OM 0.82, RCA 0.82.   Patient presents today for [facility] with possible intervention if clinically indicated.    Pre cath note:    indication:  [ ] STEMI                [ ] NSTEMI                 [ ] Acute coronary syndrome                     [ ]Unstable Angina   [ ] high risk  [ ] intermediate risk  [ ] low risk                     [ x] Stable Angina     non-invasive testing:    CCTA    Date:    7/12/24    result: [ ] high risk  [x ] intermediate risk  [ ] low risk    Anti- Anginal medications:                    [ ] not used                       [x ] used :   on BBn and Ranexa              [ ] not used but strong indication not to use    Ejection Fraction                   [ ] <29            [ ] 30-39%   [ ] 40-49%     [ ]>50%    CHF                   [ ] active (within last 14 days on meds   [ ] Chronic (on meds but no exacerbation)    COPD                   [ ] mild (on chronic bronchodilators)  [ ] moderate (on chronic steroid therapy)      [ ] severe (indication for home O2 or PACO2 >50)    Other risk factors:                       [ ] Previous MI                     [ ] CVA/ stroke                    [ ] carotid stent/ CEA                    [ ] PVD/PAD- (arterial aneurysm, non-palpable pulses, tortuous vessel with inability to insert catheter, infra-renal dissection, renal or subclavian artery stenosis)                    [ ] diabetic                    [ ] previous CABG                    [x ] Renal Failure       Right Filippo Test: positive    Adjusted Cath Bleeding Risk: 1.5%    Pre-cath Hydration: (x  )  cc IV bolus x 1 over 1 hr followed by ( x ) NS @ 100cc/hr until procedure                                                                                                                             (28 Jul 2024 14:10)      PAST MEDICAL & SURGICAL HISTORY:  Hypothyroidism, unspecified type      High cholesterol      HTN (hypertension)      BPH (benign prostatic hyperplasia)      Chronic kidney disease (CKD)      No significant past surgical history          MEDICATIONS  (STANDING):  aspirin enteric coated 81 milliGRAM(s) Oral daily  atorvastatin 80 milliGRAM(s) Oral at bedtime  chlorhexidine 4% Liquid 1 Application(s) Topical once  heparin   Injectable 5000 Unit(s) SubCutaneous every 12 hours  levothyroxine 88 MICROGram(s) Oral daily  metoprolol tartrate 12.5 milliGRAM(s) Oral every 12 hours  pantoprazole    Tablet 40 milliGRAM(s) Oral before breakfast  ranolazine 500 milliGRAM(s) Oral two times a day  sodium chloride 0.9% lock flush 3 milliLiter(s) IV Push every 8 hours  sodium chloride 0.9%. 1000 milliLiter(s) (100 mL/Hr) IV Continuous <Continuous>  tamsulosin 0.4 milliGRAM(s) Oral at bedtime    MEDICATIONS  (PRN):      Labs:                        11.9   5.57  )-----------( 183      ( 01 Aug 2024 05:15 )             37.0     08-01    140  |  107  |  22<H>  ----------------------------<  78  4.0   |  21  |  0.9    Ca    8.6      01 Aug 2024 05:15  Mg     2.3     08-01    TPro  6.8  /  Alb  3.9  /  TBili  0.4  /  DBili  x   /  AST  16  /  ALT  14  /  AlkPhos  64  08-01    PT/INR - ( 31 Jul 2024 16:28 )   PT: 12.40 sec;   INR: 1.09 ratio         PTT - ( 31 Jul 2024 16:28 )  PTT:37.0 sec  Covid:     Blood Type:   HGB A1C:   Prealbumin:   Pro-BNP:   Thyroid Panel: 07-31 @ 16:28/3.26  1.3/7.2/87    MRSA: MRSA PCR Result.: Negative (07-31 @ 16:28)   / MSSA:   Urinalysis Basic - ( 01 Aug 2024 05:15 )    Color: x / Appearance: x / SG: x / pH: x  Gluc: 78 mg/dL / Ketone: x  / Bili: x / Urobili: x   Blood: x / Protein: x / Nitrite: x   Leuk Esterase: x / RBC: x / WBC x   Sq Epi: x / Non Sq Epi: x / Bacteria: x        CXR:     EKG:    Carotid Duplex:      PFT's:    Echocardiogram:    Cardiac catheterization:    Gen: WN/WD NAD  Neuro: A&Ox3, nonfocal  Pulm: CTA B/L  CV: RRR, S1S2 +systolic murmur  Abd: Soft, NT, ND +BS  Ext: No edema, + peripheral pulses    Cardic Surgery Risk Factors  CVA and/or carotid/cerebrovascular disease. Yes  No  Explain if Yes  Aortoiliac disease Yes  No  Explain if Yes  Previous MI Yes  No  Explain if Yes  Previos Cardiac Surgery Yes  No  Explain if Yes  Hemodynamics-Unstable or Shock Yes  No  Explain if Yes  Diabetis Yes  No  Explain if Yes  Hepatic Failure Yes  No  Explain if Yes  Renal failure and/or dialysis Yes  No  Explain if Yes  Heart failure-type-present or past Yes  No  Explain if Yes  COPD Yes  No  Explain if Yes  Immune System Deficiency Yes  No  Explain if Yes  Malignant Ventricular Arrhythmia Yes  No  Explain if Yes    STS Score:     Pt has AICD/PPM [ ] Yes  [x ] No             Brand Name:  Pre-op Beta Blocker ordered within 24 hrs of surgery (CABG ONLY)?  [x ] Yes  [ ] No  If not, Why?  Type & Cross  [ ] Yes  [ ] No  NPO after Midnight [x ] Yes  [ ] No  Pre-op ABX ordered, to be taped on chart:  [ x] Yes  [ ] No     Hibiclens/Peridex ordered [x ] Yes  [ ] No  Intraop on Hold: PRBCs, CXR, SHAWNA [x ]   Consent obtained  [x ] Yes  [ ] No                 PLANNED OPERATIVE PROCEDURE(s): CABG               HD # 1                       SURGEON(s): NATI Earl    HPI:    Patient is a 69 year-old current smoker with a past medical history of HTN, HLD. hypothyroidism, BPH, CKD (baseline Cr 1.3-1.4) presented today for an elective cardiac catheterization. Patient states for several months he felt worsening shortness of breath when walking uphill and 1-2 flights of stairs, relieved with rest. Patient also complains of occasional dizziness with postural changes. Patient saw a cardiologist who recommended a CCTA. On 7/12/24, he underwent a CCTA, revealing a score of 722, short segment mixed predominantly dense calcified plaque proximal LAD resulting in moderate to severe narrowing, mixed calcified and noncalcified plaque proximal LCx resulting in moderate to severe narrowing, and mixed predominantly dense calcified plaque proximal and mid RCA resulting in moderate narrowing. After reviewing the results, the Cardiologist recommended the patient undergo a diagnostic cardiac catheterization. Today, patient underwent a cardiac catheterization via right radial artery, revealing 3vCAD. CT Surgery was consulted for an evaluation of myocardial revascularization via bypass grafts.       SUBJECTIVE ASSESSMENT:69y Male patient seen and examined at bedside.    Vital Signs Last 24 Hrs  T(F): 96.4 (01 Aug 2024 04:00), Max: 96.9 (31 Jul 2024 20:30)  HR: 64 (01 Aug 2024 12:00) (54 - 64)  BP: 91/61 (01 Aug 2024 12:00) (91/61 - 151/89)  BP(mean): 69 (01 Aug 2024 12:00) (69 - 115)  RR: 24 (01 Aug 2024 12:00) (15 - 49)  SpO2: 96% (01 Aug 2024 12:00) (94% - 99%)    RUE SBP: 110/81 (90)               LUE SBP: 126/77 (97)      I&O's Detail    31 Jul 2024 07:01  -  01 Aug 2024 07:00  --------------------------------------------------------  IN:    sodium chloride 0.9%: 300 mL  Total IN: 300 mL    OUT:    Voided (mL): 700 mL  Total OUT: 700 mL        Net: I&O's Detail    31 Jul 2024 07:01  -  01 Aug 2024 07:00  --------------------------------------------------------  Total NET: -400 mL        CAPILLARY BLOOD GLUCOSE          Allergies    No Known Allergies    Intolerances        MEDICATIONS  (STANDING):  aspirin enteric coated 81 milliGRAM(s) Oral daily  atorvastatin 80 milliGRAM(s) Oral at bedtime  chlorhexidine 4% Liquid 1 Application(s) Topical once  chlorhexidine 4% Liquid 1 Application(s) Topical once  chlorhexidine 4% Liquid 1 Application(s) Topical once  heparin   Injectable 5000 Unit(s) SubCutaneous every 12 hours  levothyroxine 88 MICROGram(s) Oral daily  metoprolol tartrate 12.5 milliGRAM(s) Oral every 12 hours  pantoprazole    Tablet 40 milliGRAM(s) Oral before breakfast  ranolazine 500 milliGRAM(s) Oral two times a day  sodium chloride 0.9% lock flush 3 milliLiter(s) IV Push every 8 hours  sodium chloride 0.9%. 1000 milliLiter(s) (100 mL/Hr) IV Continuous <Continuous>  tamsulosin 0.4 milliGRAM(s) Oral at bedtime    MEDICATIONS  (PRN):    Home Medications:  Flomax 0.4 mg oral capsule: 1 cap(s) orally once a day (31 Jul 2024 17:17)  levothyroxine 88 mcg (0.088 mg) oral tablet: 1 tab(s) orally once a day (31 Jul 2024 17:17)  Metoprolol Succinate ER 25 mg oral tablet, extended release: 1 tab(s) orally once a day (31 Jul 2024 17:17)  Ranexa 500 mg oral tablet, extended release: 1 tab(s) orally 2 times a day (31 Jul 2024 17:17)  rosuvastatin 20 mg oral tablet: 1 tab(s) orally once a day (at bedtime) (31 Jul 2024 17:17)      Physical Exam:  General: NAD; A&Ox3  Cardiac: S1/S2, RRR, no murmur, no rubs  Lungs: unlabored respirations, CTA b/l, no wheeze, no rales, no crackles  Abdomen: Soft/NT/ND; positive bowel sounds x 4  Extremities: No edema b/l lower extremities; good capillary refill; no cyanosis; palpable 1+ pedal pulses b/l    BOWEL MOVEMENT:  [] YES [x] NO, If No, Timing since last BM Day # 2        LABS:                        11.9<L>  5.57  )-----------( 183      ( 01 Aug 2024 05:15 )             37.0<L>                        12.3<L>  5.62  )-----------( 200      ( 31 Jul 2024 09:25 )             38.0<L>    08-01    140  |  107  |  22<H>  ----------------------------<  78  4.0   |  21  |  0.9  07-31    145  |  111<H>  |  22<H>  ----------------------------<  76  4.5   |  23  |  1.0    Ca    8.6      01 Aug 2024 05:15  Mg     2.3     08-01    TPro  6.8 [6.0 - 8.0]  /  Alb  3.9 [3.5 - 5.2]  /  TBili  0.4 [0.2 - 1.2]  /  DBili  x   /  AST  16 [0 - 41]  /  ALT  14 [0 - 41]  /  AlkPhos  64 [30 - 115]  08-01    PT/INR - ( 31 Jul 2024 16:28 )   PT: ;   INR: 1.09 ratio         PTT - ( 31 Jul 2024 16:28 )  PTT:37.0 sec  Urinalysis Basic - ( 01 Aug 2024 05:15 )    Color: x / Appearance: x / SG: x / pH: x  Gluc: 78 mg/dL / Ketone: x  / Bili: x / Urobili: x   Blood: x / Protein: x / Nitrite: x   Leuk Esterase: x / RBC: x / WBC x   Sq Epi: x / Non Sq Epi: x / Bacteria: x        A1C with Estimated Average Glucose Result: 5.8 % (07-31-24 @ 16:28)      RADIOLOGY & ADDITIONAL TESTS:  CXR:       EKG:    Carotid Duplex:      PFT's:    Echocardiogram:     Cardiac catheterization:    CT CHEST:      Pharmacologic DVT Prophylaxis: [] YES, []NO: Contraindication:   [] HEPARIN: Dose: XX mg  Q24H    [] LOVENOX: Dose: XX mg  Q24H                 SCD's: YESb/l    GI Prophylaxis: Protonix [], Pepcid []    Pre-op ACEi/ARB/CCB held 24 hours prior to planned procedure: [] Yes, [] NO: indication:  Pre-Op Beta-Blockers: []Yes, []No: contraindication:  Pre-Op Aspirin: []Yes,  []No: contraindication: [] Held for Pre-op cardiac valve surgery with no CAD  Pre-Op Statin: []Yes, []No: contraindication:    Ambulation/Activity Status:  5meter walk test: T1:      s/T2:     s/T3:     s        Cardiac Surgery Risk Factors  CVA and/or carotid/cerebrovascular disease. Yes  No  Explain if Yes  Aortoiliac disease Yes  No  Explain if Yes  Previous MI Yes  No  Explain if Yes  Previous Cardiac Surgery Yes  No  Explain if Yes  Hemodynamics-Unstable or Shock Yes  No  Explain if Yes  Diabetes Yes  No  Explain if Yes  Hepatic Failure Yes  No  Explain if Yes  Renal failure and/or dialysis Yes  No  Explain if Yes  Heart failure-type-present or past Yes  No  Explain if Yes  COPD Yes  No  Explain if Yes  Immune System Deficiency Yes  No  Explain if Yes  Malignant Ventricular Arrhythmia Yes  No  Explain if Yes    STS Score:     Assessment/Plan:  69y Male Pre-op for   - Case and plan discussed with CTU Intensivist and CT Surgeon - Dr. Merritt/Boyd/Rajat/Kevin. STS risk score assessed and discussed risk with patient. Attending note to follow.  - Continue supportive care.    - Continue DVT/GI prophylaxis  - Incentive Spirometry 10 times an hour  - Continue to advance physical activity as tolerated and continue PT/OT as directed  1. CAD: Continue ASA, statin, BB  2. HTN:   3. A. Fib:   4. COPD/Hypoxia:   5. DM/Glucose Control:                PLANNED OPERATIVE PROCEDURE(s): CABG               HD # 1                       SURGEON(s): NATI Earl    HPI:    Patient is a 69 year-old current smoker with a past medical history of HTN, HLD. hypothyroidism, BPH, CKD (baseline Cr 1.3-1.4) presented today for an elective cardiac catheterization. Patient states for several months he felt worsening shortness of breath when walking uphill and 1-2 flights of stairs, relieved with rest. Patient also complains of occasional dizziness with postural changes. Patient saw a cardiologist who recommended a CCTA. On 7/12/24, he underwent a CCTA, revealing a score of 722, short segment mixed predominantly dense calcified plaque proximal LAD resulting in moderate to severe narrowing, mixed calcified and noncalcified plaque proximal LCx resulting in moderate to severe narrowing, and mixed predominantly dense calcified plaque proximal and mid RCA resulting in moderate narrowing. After reviewing the results, the Cardiologist recommended the patient undergo a diagnostic cardiac catheterization. Today, patient underwent a cardiac catheterization via right radial artery, revealing 3vCAD. CT Surgery was consulted for an evaluation of myocardial revascularization via bypass grafts.       SUBJECTIVE ASSESSMENT:69y Male patient seen and examined at bedside. Lactulose given for bowel movement, no BM for 2 days.     Vital Signs Last 24 Hrs  T(F): 96.4 (01 Aug 2024 04:00), Max: 96.9 (31 Jul 2024 20:30)  HR: 64 (01 Aug 2024 12:00) (54 - 64)  BP: 91/61 (01 Aug 2024 12:00) (91/61 - 151/89)  BP(mean): 69 (01 Aug 2024 12:00) (69 - 115)  RR: 24 (01 Aug 2024 12:00) (15 - 49)  SpO2: 96% (01 Aug 2024 12:00) (94% - 99%)    RUE SBP: 110/81 (90)               LUE SBP: 126/77 (97)      I&O's Detail    31 Jul 2024 07:01  -  01 Aug 2024 07:00  --------------------------------------------------------  IN:    sodium chloride 0.9%: 300 mL  Total IN: 300 mL    OUT:    Voided (mL): 700 mL  Total OUT: 700 mL        Net: I&O's Detail    31 Jul 2024 07:01  -  01 Aug 2024 07:00  --------------------------------------------------------  Total NET: -400 mL        CAPILLARY BLOOD GLUCOSE          Allergies    No Known Allergies    Intolerances        MEDICATIONS  (STANDING):  aspirin enteric coated 81 milliGRAM(s) Oral daily  atorvastatin 80 milliGRAM(s) Oral at bedtime  chlorhexidine 4% Liquid 1 Application(s) Topical once  chlorhexidine 4% Liquid 1 Application(s) Topical once  chlorhexidine 4% Liquid 1 Application(s) Topical once  heparin   Injectable 5000 Unit(s) SubCutaneous every 12 hours  levothyroxine 88 MICROGram(s) Oral daily  metoprolol tartrate 12.5 milliGRAM(s) Oral every 12 hours  pantoprazole    Tablet 40 milliGRAM(s) Oral before breakfast  ranolazine 500 milliGRAM(s) Oral two times a day  sodium chloride 0.9% lock flush 3 milliLiter(s) IV Push every 8 hours  sodium chloride 0.9%. 1000 milliLiter(s) (100 mL/Hr) IV Continuous <Continuous>  tamsulosin 0.4 milliGRAM(s) Oral at bedtime    MEDICATIONS  (PRN):    Home Medications:  Flomax 0.4 mg oral capsule: 1 cap(s) orally once a day (31 Jul 2024 17:17)  levothyroxine 88 mcg (0.088 mg) oral tablet: 1 tab(s) orally once a day (31 Jul 2024 17:17)  Metoprolol Succinate ER 25 mg oral tablet, extended release: 1 tab(s) orally once a day (31 Jul 2024 17:17)  Ranexa 500 mg oral tablet, extended release: 1 tab(s) orally 2 times a day (31 Jul 2024 17:17)  rosuvastatin 20 mg oral tablet: 1 tab(s) orally once a day (at bedtime) (31 Jul 2024 17:17)      Physical Exam:  General: NAD; A&Ox3  Cardiac: S1/S2, RRR, no murmur, no rubs  Lungs: unlabored respirations, CTA b/l, no wheeze, no rales, no crackles  Abdomen: Soft/NT/ND; positive bowel sounds x 4  Extremities: No edema b/l lower extremities; good capillary refill; no cyanosis; palpable 1+ pedal pulses b/l    BOWEL MOVEMENT:  [] YES [x] NO, If No, Timing since last BM Day # 2        LABS:                        11.9<L>  5.57  )-----------( 183      ( 01 Aug 2024 05:15 )             37.0<L>                        12.3<L>  5.62  )-----------( 200      ( 31 Jul 2024 09:25 )             38.0<L>    08-01    140  |  107  |  22<H>  ----------------------------<  78  4.0   |  21  |  0.9  07-31    145  |  111<H>  |  22<H>  ----------------------------<  76  4.5   |  23  |  1.0    Ca    8.6      01 Aug 2024 05:15  Mg     2.3     08-01    TPro  6.8 [6.0 - 8.0]  /  Alb  3.9 [3.5 - 5.2]  /  TBili  0.4 [0.2 - 1.2]  /  DBili  x   /  AST  16 [0 - 41]  /  ALT  14 [0 - 41]  /  AlkPhos  64 [30 - 115]  08-01    PT/INR - ( 31 Jul 2024 16:28 )   PT: ;   INR: 1.09 ratio         PTT - ( 31 Jul 2024 16:28 )  PTT:37.0 sec  Urinalysis Basic - ( 01 Aug 2024 05:15 )    Color: x / Appearance: x / SG: x / pH: x  Gluc: 78 mg/dL / Ketone: x  / Bili: x / Urobili: x   Blood: x / Protein: x / Nitrite: x   Leuk Esterase: x / RBC: x / WBC x   Sq Epi: x / Non Sq Epi: x / Bacteria: x        A1C with Estimated Average Glucose Result: 5.8 % (07-31-24 @ 16:28)      RADIOLOGY & ADDITIONAL TESTS:  CXR:   < from: Xray Chest 2 Views PA/Lat (07.31.24 @ 17:48) >    INTERPRETATION:  CLINICAL HISTORY: pre-op CABG.    COMPARISON: 5/15/2024.    TECHNIQUE: Frontal and lateral chest radiographs. Adequate positioning.    FINDINGS:    Support devices: None.    Cardiac/mediastinum/hilum: Stable.    Lung parenchyma/Pleura: No focal parenchymal opacities, pleural   effusions, or pneumothorax.    Skeleton/soft tissues: Stable.      IMPRESSION:    No radiographic evidence of acute cardiopulmonary disease.        EKG:  < from: 12 Lead ECG (08.01.24 @ 07:24) >  Ventricular Rate 59 BPM    Atrial Rate 59 BPM    P-R Interval 174 ms    QRS Duration 76 ms    Q-T Interval 428 ms    QTC Calculation(Bazett) 423 ms    P Axis 24 degrees    R Axis -8 degrees    T Axis 37 degrees    Diagnosis Line Sinus Bradycardia  Normal ECG        Carotid Duplex: completed, awaiting results      PFT's: FEV 1 78% predicted, 2.80L        Echocardiogram:  < from: TTE Echo Complete w/ Contrast w/ Doppler (08.01.24 @ 10:17) >  Summary:   1. Normal global left ventricular systolic function.   2. LV Ejection Fraction by Kim's Method with a biplane EF of 58 %.   3. There is normal left ventricular diastolic function, with normal   filling pressure.   4. No evidence of LV thrombus on contrast images.   5. Normal right ventricular size and function.   6. Sclerotic aortic valve with normal opening.   7. No hemodynamically significant valvular regurgitation or stenosis.   8. Adequate TR velocity was not obtained to accurately assess RVSP.   9. Endocardial visualization was enhanced with intravenous echo contrast.    PHYSICIAN INTERPRETATION:  Left Ventricle: Endocardial visualization was enhanced with intravenous   echo contrast. The left ventricular internal cavity size is normal.   Global LV systolic function was normal. Spectral Doppler shows normal   pattern of LV diastolic filling. Normal LV filling pressures. No evidence   of LV thrombus on contrast images.  RightVentricle: Normal right ventricular size and function.  Left Atrium: Normal left atrial size.  Right Atrium: Normal right atrial size.  Pericardium: There is no evidence of pericardial effusion.  Mitral Valve: Structurally normal mitral valve, with normal leaflet   excursion. No evidence of mitral stenosis. Trace mitral valve   regurgitation is seen.  Tricuspid Valve: Structurally normal tricuspid valve, with normal leaflet   excursion. Trivial tricuspid regurgitation is visualized. Adequate TR   velocity was not obtained to accurately assess RVSP.  Aortic Valve: No evidence of aortic stenosis. Sclerotic aortic valve with   normal opening. No evidence of aortic valve regurgitation is seen.  Pulmonic Valve: The pulmonic valve was not well visualized.  Aorta: The aortic root and ascending aorta are structurally normal, with   no evidence of dilitation.  Venous: The inferior vena cava is not well visualized.  In comparison to the previous echocardiogram(s): There are no prior   studies on this patient for comparison purposes.      Cardiac catheterization:  FINDINGS:     Coronary Dominance: Right dominate      LM: Mild calcified disease distally    LAD: 80% ostial LAD calcified disease. 80% lesion distal LAD    CX: 80% stenosis OM1, 80% stenosis distal Lcx, OM3  with distal collaterals from RPL  RCA: Large dominate vessel. 80% lesion Mid RCA (FFR 0.82, delta 0.12)    LVEDP: 8 mmHg     ESTIMATED BLOOD LOSS: < 10 mL         CT CHEST:  < from: CT Chest No Cont (07.31.24 @ 18:23) >  FINDINGS:    LUNGS AND LARGE AIRWAYS: Patent central airways. Emphysematous changes   with bullous disease in the apices.    Basilar reticular opacities with subpleural septal thickening and fibrous   bands, worsening.    No suspicious pulmonary nodules.  PLEURA: No pleural effusion.  VESSELS: Great vessels are atherosclerotic.  HEART: Heart size is normal. Trace pericardial fluid.  MEDIASTINUM AND LATHA: No lymphadenopathy.  CHEST WALL AND LOWER NECK: Within normal limits.  VISUALIZED UPPER ABDOMEN: Please see concurrent CT scan of the abdomen   and pelvis.  BONES: Degenerative changes of the spine.    IMPRESSION:  Emphysematous changes with bullous disease.    Basilar reticular opacities, nonspecific, worsening.        Pharmacologic DVT Prophylaxis: [X] YES, []NO: Contraindication:   [X] HEPARIN: Dose: 5000u  Q12H    [] LOVENOX: Dose: XX mg  Q24H                 SCD's: YESb/l    GI Prophylaxis: Protonix [X], Pepcid []    Pre-op ACEi/ARB/CCB held 24 hours prior to planned procedure: [X] Yes, [] NO: indication:  Pre-Op Beta-Blockers: [X]Yes, []No: contraindication:  Pre-Op Aspirin: [X]Yes,  []No: contraindication: [] Held for Pre-op cardiac valve surgery with no CAD  Pre-Op Statin: [X]Yes, []No: contraindication:    Ambulation/Activity Status: ambulate as tolerated  5meter walk test: T1: 8.56s/T2: 9.56s/T3: 8.98s        Cardiac Surgery Risk Factors  CVA and/or carotid/cerebrovascular disease. No   Aortoiliac disease No  Previous MI No  Previous Cardiac Surgery No  Hemodynamics-Unstable or Shock No  Diabetes Yes, prediabetic A1c 5.8  Hepatic Failure No  Renal failure and/or dialysis No  Heart failure-type-present or past No  COPD No  Immune System Deficiency No  Malignant Ventricular Arrhythmia No        STS Score:   Procedure Type: Isolated CABG  Perioperative Outcome	Estimate %  Operative Mortality	0.546%  Morbidity & Mortality	3.96%  Stroke	0.652%  Renal Failure	0.592%  Reoperation	1.87%  Prolonged Ventilation	2.04%  Deep Sternal Wound Infection	0.106%  Long Hospital Stay (>14 days)	1.87%  Short Hospital Stay (<6 days)*	60.5%      Clinical Summary  Planned Surgery:	Isolated CABG, Urgent, First cardiovascular surgery  Demographics:	69 year old, , male, 81.6kg, 170.2cm, BMI: 28.2 kg/m²  Lab Values:	Creatinine: 0.9 mg/dL, Hematocrit: 37.9%, WBC Count: 5.57 10³/µL, Platelet Count: 215873 cells/µL  Substance Abuse:	Current smoker  Risk Factors / Comorbidities:	Hypertension  Cardiac Status:	NYHA Class I, Ejection Fraction = 58%  Coronary Artery Disease:	3 vessels diseased, Proximal LAD Stenosis = 70%, Stable Angina        Assessment/Plan:  69y Male Pre-op for CABG in AM  - Case and plan discussed with CTU Intensivist and CT Surgeon - Dr. Merritt/Body/Rajat/Kevin. STS risk score assessed and discussed risk with patient. Attending note to follow.  - Continue supportive care.    - Continue DVT/GI prophylaxis  - Incentive Spirometry 10 times an hour  - Continue to advance physical activity as tolerated and continue PT/OT as directed  - CAD: Continue ASA, statin, BB. Pre-op work-up in progress. Awaiting Carotids and Echo report to be read. NPO after midnight. Surgical scrubs and clipping ordered. Consents in chart. CABG sx plan for tomorrow

## 2024-08-02 ENCOUNTER — TRANSCRIPTION ENCOUNTER (OUTPATIENT)
Age: 69
End: 2024-08-02

## 2024-08-02 LAB
ABO RH CONFIRMATION: SIGNIFICANT CHANGE UP
ALBUMIN SERPL ELPH-MCNC: 3.8 G/DL — SIGNIFICANT CHANGE UP (ref 3.5–5.2)
ALBUMIN SERPL ELPH-MCNC: 4.1 G/DL — SIGNIFICANT CHANGE UP (ref 3.5–5.2)
ALP SERPL-CCNC: 46 U/L — SIGNIFICANT CHANGE UP (ref 30–115)
ALP SERPL-CCNC: 68 U/L — SIGNIFICANT CHANGE UP (ref 30–115)
ALT FLD-CCNC: 10 U/L — SIGNIFICANT CHANGE UP (ref 0–41)
ALT FLD-CCNC: 13 U/L — SIGNIFICANT CHANGE UP (ref 0–41)
ANION GAP SERPL CALC-SCNC: 10 MMOL/L — SIGNIFICANT CHANGE UP (ref 7–14)
ANION GAP SERPL CALC-SCNC: 10 MMOL/L — SIGNIFICANT CHANGE UP (ref 7–14)
APTT BLD: 28.6 SEC — SIGNIFICANT CHANGE UP (ref 27–39.2)
APTT BLD: 32.6 SEC — SIGNIFICANT CHANGE UP (ref 27–39.2)
AST SERPL-CCNC: 15 U/L — SIGNIFICANT CHANGE UP (ref 0–41)
AST SERPL-CCNC: 22 U/L — SIGNIFICANT CHANGE UP (ref 0–41)
BASOPHILS # BLD AUTO: 0.04 K/UL — SIGNIFICANT CHANGE UP (ref 0–0.2)
BASOPHILS # BLD AUTO: 0.04 K/UL — SIGNIFICANT CHANGE UP (ref 0–0.2)
BASOPHILS NFR BLD AUTO: 0.4 % — SIGNIFICANT CHANGE UP (ref 0–1)
BASOPHILS NFR BLD AUTO: 0.7 % — SIGNIFICANT CHANGE UP (ref 0–1)
BILIRUB SERPL-MCNC: 0.3 MG/DL — SIGNIFICANT CHANGE UP (ref 0.2–1.2)
BILIRUB SERPL-MCNC: 0.3 MG/DL — SIGNIFICANT CHANGE UP (ref 0.2–1.2)
BUN SERPL-MCNC: 15 MG/DL — SIGNIFICANT CHANGE UP (ref 10–20)
BUN SERPL-MCNC: 19 MG/DL — SIGNIFICANT CHANGE UP (ref 10–20)
CALCIUM SERPL-MCNC: 7.7 MG/DL — LOW (ref 8.4–10.4)
CALCIUM SERPL-MCNC: 9.1 MG/DL — SIGNIFICANT CHANGE UP (ref 8.4–10.5)
CHLORIDE SERPL-SCNC: 105 MMOL/L — SIGNIFICANT CHANGE UP (ref 98–110)
CHLORIDE SERPL-SCNC: 110 MMOL/L — SIGNIFICANT CHANGE UP (ref 98–110)
CO2 SERPL-SCNC: 21 MMOL/L — SIGNIFICANT CHANGE UP (ref 17–32)
CO2 SERPL-SCNC: 24 MMOL/L — SIGNIFICANT CHANGE UP (ref 17–32)
CREAT SERPL-MCNC: 1 MG/DL — SIGNIFICANT CHANGE UP (ref 0.7–1.5)
CREAT SERPL-MCNC: 1.1 MG/DL — SIGNIFICANT CHANGE UP (ref 0.7–1.5)
EGFR: 74 ML/MIN/1.73M2 — SIGNIFICANT CHANGE UP
EGFR: 82 ML/MIN/1.73M2 — SIGNIFICANT CHANGE UP
EOSINOPHIL # BLD AUTO: 0.1 K/UL — SIGNIFICANT CHANGE UP (ref 0–0.7)
EOSINOPHIL # BLD AUTO: 0.15 K/UL — SIGNIFICANT CHANGE UP (ref 0–0.7)
EOSINOPHIL NFR BLD AUTO: 1.1 % — SIGNIFICANT CHANGE UP (ref 0–8)
EOSINOPHIL NFR BLD AUTO: 2.6 % — SIGNIFICANT CHANGE UP (ref 0–8)
GAS PNL BLDA: SIGNIFICANT CHANGE UP
GAS PNL BLDA: SIGNIFICANT CHANGE UP
GLUCOSE BLDC GLUCOMTR-MCNC: 106 MG/DL — HIGH (ref 70–99)
GLUCOSE BLDC GLUCOMTR-MCNC: 127 MG/DL — HIGH (ref 70–99)
GLUCOSE BLDC GLUCOMTR-MCNC: 145 MG/DL — HIGH (ref 70–99)
GLUCOSE BLDC GLUCOMTR-MCNC: 166 MG/DL — HIGH (ref 70–99)
GLUCOSE BLDC GLUCOMTR-MCNC: 170 MG/DL — HIGH (ref 70–99)
GLUCOSE BLDC GLUCOMTR-MCNC: 174 MG/DL — HIGH (ref 70–99)
GLUCOSE BLDC GLUCOMTR-MCNC: 86 MG/DL — SIGNIFICANT CHANGE UP (ref 70–99)
GLUCOSE SERPL-MCNC: 139 MG/DL — HIGH (ref 70–99)
GLUCOSE SERPL-MCNC: 96 MG/DL — SIGNIFICANT CHANGE UP (ref 70–99)
HCT VFR BLD CALC: 25 % — LOW (ref 42–52)
HCT VFR BLD CALC: 28.5 % — LOW (ref 42–52)
HCT VFR BLD CALC: 36.8 % — LOW (ref 42–52)
HGB BLD-MCNC: 12.2 G/DL — LOW (ref 14–18)
HGB BLD-MCNC: 8.2 G/DL — LOW (ref 14–18)
HGB BLD-MCNC: 9.4 G/DL — LOW (ref 14–18)
IMM GRANULOCYTES NFR BLD AUTO: 0.2 % — SIGNIFICANT CHANGE UP (ref 0.1–0.3)
IMM GRANULOCYTES NFR BLD AUTO: 0.5 % — HIGH (ref 0.1–0.3)
INR BLD: 1.01 RATIO — SIGNIFICANT CHANGE UP (ref 0.65–1.3)
INR BLD: 1.16 RATIO — SIGNIFICANT CHANGE UP (ref 0.65–1.3)
LYMPHOCYTES # BLD AUTO: 1.45 K/UL — SIGNIFICANT CHANGE UP (ref 1.2–3.4)
LYMPHOCYTES # BLD AUTO: 2.08 K/UL — SIGNIFICANT CHANGE UP (ref 1.2–3.4)
LYMPHOCYTES # BLD AUTO: 22.2 % — SIGNIFICANT CHANGE UP (ref 20.5–51.1)
LYMPHOCYTES # BLD AUTO: 24.8 % — SIGNIFICANT CHANGE UP (ref 20.5–51.1)
MAGNESIUM SERPL-MCNC: 2.6 MG/DL — HIGH (ref 1.8–2.4)
MAGNESIUM SERPL-MCNC: 3 MG/DL — HIGH (ref 1.8–2.4)
MCHC RBC-ENTMCNC: 30.4 PG — SIGNIFICANT CHANGE UP (ref 27–31)
MCHC RBC-ENTMCNC: 30.5 PG — SIGNIFICANT CHANGE UP (ref 27–31)
MCHC RBC-ENTMCNC: 30.7 PG — SIGNIFICANT CHANGE UP (ref 27–31)
MCHC RBC-ENTMCNC: 32.8 G/DL — SIGNIFICANT CHANGE UP (ref 32–37)
MCHC RBC-ENTMCNC: 33 G/DL — SIGNIFICANT CHANGE UP (ref 32–37)
MCHC RBC-ENTMCNC: 33.2 G/DL — SIGNIFICANT CHANGE UP (ref 32–37)
MCV RBC AUTO: 92.5 FL — SIGNIFICANT CHANGE UP (ref 80–94)
MCV RBC AUTO: 92.5 FL — SIGNIFICANT CHANGE UP (ref 80–94)
MCV RBC AUTO: 92.6 FL — SIGNIFICANT CHANGE UP (ref 80–94)
MONOCYTES # BLD AUTO: 0.33 K/UL — SIGNIFICANT CHANGE UP (ref 0.1–0.6)
MONOCYTES # BLD AUTO: 0.35 K/UL — SIGNIFICANT CHANGE UP (ref 0.1–0.6)
MONOCYTES NFR BLD AUTO: 3.7 % — SIGNIFICANT CHANGE UP (ref 1.7–9.3)
MONOCYTES NFR BLD AUTO: 5.7 % — SIGNIFICANT CHANGE UP (ref 1.7–9.3)
NEUTROPHILS # BLD AUTO: 3.86 K/UL — SIGNIFICANT CHANGE UP (ref 1.4–6.5)
NEUTROPHILS # BLD AUTO: 6.76 K/UL — HIGH (ref 1.4–6.5)
NEUTROPHILS NFR BLD AUTO: 66 % — SIGNIFICANT CHANGE UP (ref 42.2–75.2)
NEUTROPHILS NFR BLD AUTO: 72.1 % — SIGNIFICANT CHANGE UP (ref 42.2–75.2)
NRBC # BLD: 0 /100 WBCS — SIGNIFICANT CHANGE UP (ref 0–0)
PLATELET # BLD AUTO: 116 K/UL — LOW (ref 130–400)
PLATELET # BLD AUTO: 136 K/UL — SIGNIFICANT CHANGE UP (ref 130–400)
PLATELET # BLD AUTO: 175 K/UL — SIGNIFICANT CHANGE UP (ref 130–400)
PMV BLD: 10 FL — SIGNIFICANT CHANGE UP (ref 7.4–10.4)
PMV BLD: 9.7 FL — SIGNIFICANT CHANGE UP (ref 7.4–10.4)
PMV BLD: 9.8 FL — SIGNIFICANT CHANGE UP (ref 7.4–10.4)
POTASSIUM SERPL-MCNC: 4.1 MMOL/L — SIGNIFICANT CHANGE UP (ref 3.5–5)
POTASSIUM SERPL-MCNC: 4.2 MMOL/L — SIGNIFICANT CHANGE UP (ref 3.5–5)
POTASSIUM SERPL-SCNC: 4.1 MMOL/L — SIGNIFICANT CHANGE UP (ref 3.5–5)
POTASSIUM SERPL-SCNC: 4.2 MMOL/L — SIGNIFICANT CHANGE UP (ref 3.5–5)
PROT SERPL-MCNC: 5.1 G/DL — LOW (ref 6–8)
PROT SERPL-MCNC: 6.9 G/DL — SIGNIFICANT CHANGE UP (ref 6–8)
PROTHROM AB SERPL-ACNC: 11.5 SEC — SIGNIFICANT CHANGE UP (ref 9.95–12.87)
PROTHROM AB SERPL-ACNC: 13.2 SEC — HIGH (ref 9.95–12.87)
RBC # BLD: 2.7 M/UL — LOW (ref 4.7–6.1)
RBC # BLD: 3.08 M/UL — LOW (ref 4.7–6.1)
RBC # BLD: 3.98 M/UL — LOW (ref 4.7–6.1)
RBC # FLD: 14.2 % — SIGNIFICANT CHANGE UP (ref 11.5–14.5)
SODIUM SERPL-SCNC: 139 MMOL/L — SIGNIFICANT CHANGE UP (ref 135–146)
SODIUM SERPL-SCNC: 141 MMOL/L — SIGNIFICANT CHANGE UP (ref 135–146)
WBC # BLD: 5.84 K/UL — SIGNIFICANT CHANGE UP (ref 4.8–10.8)
WBC # BLD: 8.21 K/UL — SIGNIFICANT CHANGE UP (ref 4.8–10.8)
WBC # BLD: 9.38 K/UL — SIGNIFICANT CHANGE UP (ref 4.8–10.8)
WBC # FLD AUTO: 5.84 K/UL — SIGNIFICANT CHANGE UP (ref 4.8–10.8)
WBC # FLD AUTO: 8.21 K/UL — SIGNIFICANT CHANGE UP (ref 4.8–10.8)
WBC # FLD AUTO: 9.38 K/UL — SIGNIFICANT CHANGE UP (ref 4.8–10.8)

## 2024-08-02 PROCEDURE — 33508 ENDOSCOPIC VEIN HARVEST: CPT | Mod: AS,59

## 2024-08-02 PROCEDURE — 33508 ENDOSCOPIC VEIN HARVEST: CPT | Mod: 59

## 2024-08-02 PROCEDURE — 33519 CABG ARTERY-VEIN THREE: CPT | Mod: AS

## 2024-08-02 PROCEDURE — 33533 CABG ARTERIAL SINGLE: CPT | Mod: AS

## 2024-08-02 PROCEDURE — 33533 CABG ARTERIAL SINGLE: CPT

## 2024-08-02 PROCEDURE — 33519 CABG ARTERY-VEIN THREE: CPT

## 2024-08-02 PROCEDURE — 71045 X-RAY EXAM CHEST 1 VIEW: CPT | Mod: 26

## 2024-08-02 RX ORDER — DEXTROSE 4 G
25 TABLET,CHEWABLE ORAL
Refills: 0 | Status: DISCONTINUED | OUTPATIENT
Start: 2024-08-02 | End: 2024-08-06

## 2024-08-02 RX ORDER — ACETAMINOPHEN 500 MG
1000 TABLET ORAL ONCE
Refills: 0 | Status: COMPLETED | OUTPATIENT
Start: 2024-08-03 | End: 2024-08-03

## 2024-08-02 RX ORDER — ALBUMIN HUMAN 25 %
750 INTRAVENOUS SOLUTION INTRAVENOUS ONCE
Refills: 0 | Status: DISCONTINUED | OUTPATIENT
Start: 2024-08-02 | End: 2024-08-02

## 2024-08-02 RX ORDER — ACETAMINOPHEN 500 MG
650 TABLET ORAL EVERY 6 HOURS
Refills: 0 | Status: DISCONTINUED | OUTPATIENT
Start: 2024-08-02 | End: 2024-08-03

## 2024-08-02 RX ORDER — OXYCODONE HYDROCHLORIDE 30 MG/1
10 TABLET ORAL EVERY 4 HOURS
Refills: 0 | Status: DISCONTINUED | OUTPATIENT
Start: 2024-08-02 | End: 2024-08-04

## 2024-08-02 RX ORDER — ASPIRIN 325 MG
10 TABLET ORAL ONCE
Refills: 0 | Status: DISCONTINUED | OUTPATIENT
Start: 2024-08-04 | End: 2024-08-09

## 2024-08-02 RX ORDER — ACETAMINOPHEN 500 MG
1000 TABLET ORAL ONCE
Refills: 0 | Status: COMPLETED | OUTPATIENT
Start: 2024-08-02 | End: 2024-08-04

## 2024-08-02 RX ORDER — FENTANYL CITRATE 1200 UG/1
50 LOZENGE ORAL; TRANSMUCOSAL ONCE
Refills: 0 | Status: DISCONTINUED | OUTPATIENT
Start: 2024-08-02 | End: 2024-08-02

## 2024-08-02 RX ORDER — POTASSIUM CHLORIDE 1500 MG/1
20 TABLET, EXTENDED RELEASE ORAL ONCE
Refills: 0 | Status: COMPLETED | OUTPATIENT
Start: 2024-08-02 | End: 2024-08-02

## 2024-08-02 RX ORDER — BUPIVACAINE HCL/0.9 % NACL/PF 0.25 %
0.05 PLASTIC BAG, INJECTION (ML) EPIDURAL
Qty: 8 | Refills: 0 | Status: DISCONTINUED | OUTPATIENT
Start: 2024-08-02 | End: 2024-08-03

## 2024-08-02 RX ORDER — NITROGLYCERIN 400 UG/1
15 AEROSOL, METERED SUBLINGUAL
Qty: 50 | Refills: 0 | Status: DISCONTINUED | OUTPATIENT
Start: 2024-08-02 | End: 2024-08-06

## 2024-08-02 RX ORDER — FENTANYL CITRATE 1200 UG/1
50 LOZENGE ORAL; TRANSMUCOSAL ONCE
Refills: 0 | Status: DISCONTINUED | OUTPATIENT
Start: 2024-08-02 | End: 2024-08-03

## 2024-08-02 RX ORDER — BACTERIOSTATIC SODIUM CHLORIDE 0.9 %
1000 VIAL (ML) INJECTION
Refills: 0 | Status: DISCONTINUED | OUTPATIENT
Start: 2024-08-02 | End: 2024-08-07

## 2024-08-02 RX ORDER — DEXTROSE 4 G
50 TABLET,CHEWABLE ORAL
Refills: 0 | Status: DISCONTINUED | OUTPATIENT
Start: 2024-08-02 | End: 2024-08-06

## 2024-08-02 RX ORDER — OXYCODONE HYDROCHLORIDE 30 MG/1
5 TABLET ORAL EVERY 4 HOURS
Refills: 0 | Status: DISCONTINUED | OUTPATIENT
Start: 2024-08-02 | End: 2024-08-03

## 2024-08-02 RX ORDER — MEPERIDINE HCL IN 0.9 % NACL 10 MG/ML
25 PREFILLED PUMP RESERVOIR INTRAVENOUS ONCE
Refills: 0 | Status: DISCONTINUED | OUTPATIENT
Start: 2024-08-02 | End: 2024-08-06

## 2024-08-02 RX ORDER — ALBUMIN HUMAN 25 %
750 INTRAVENOUS SOLUTION INTRAVENOUS ONCE
Refills: 0 | Status: COMPLETED | OUTPATIENT
Start: 2024-08-02 | End: 2024-08-02

## 2024-08-02 RX ORDER — NICARDIPINE IN NACL, ISO-OSM 40MG/200ML
5 INTRAVENOUS SOLUTION, PIGGYBACK (ML) INTRAVENOUS
Qty: 40 | Refills: 0 | Status: DISCONTINUED | OUTPATIENT
Start: 2024-08-02 | End: 2024-08-03

## 2024-08-02 RX ORDER — CEFEPIME HYDROCHLORIDE 1 G/1
2000 INJECTION, POWDER, FOR SOLUTION INTRAMUSCULAR; INTRAVENOUS EVERY 12 HOURS
Refills: 0 | Status: DISCONTINUED | OUTPATIENT
Start: 2024-08-02 | End: 2024-08-05

## 2024-08-02 RX ORDER — ACETAMINOPHEN 500 MG
1000 TABLET ORAL ONCE
Refills: 0 | Status: COMPLETED | OUTPATIENT
Start: 2024-08-02 | End: 2024-08-02

## 2024-08-02 RX ORDER — FAMOTIDINE 40 MG/1
20 TABLET, FILM COATED ORAL EVERY 12 HOURS
Refills: 0 | Status: DISCONTINUED | OUTPATIENT
Start: 2024-08-02 | End: 2024-08-03

## 2024-08-02 RX ORDER — ASPIRIN 500 MG
300 TABLET ORAL ONCE
Refills: 0 | Status: DISCONTINUED | OUTPATIENT
Start: 2024-08-02 | End: 2024-08-03

## 2024-08-02 RX ORDER — SENNOSIDES 8.6 MG/1
2 TABLET ORAL AT BEDTIME
Refills: 0 | Status: DISCONTINUED | OUTPATIENT
Start: 2024-08-03 | End: 2024-08-09

## 2024-08-02 RX ORDER — ALBUMIN HUMAN 25 %
75 INTRAVENOUS SOLUTION INTRAVENOUS ONCE
Refills: 0 | Status: DISCONTINUED | OUTPATIENT
Start: 2024-08-02 | End: 2024-08-02

## 2024-08-02 RX ORDER — CEFAZOLIN SODIUM 10 G
2000 VIAL (EA) INJECTION EVERY 8 HOURS
Refills: 0 | Status: DISCONTINUED | OUTPATIENT
Start: 2024-08-02 | End: 2024-08-04

## 2024-08-02 RX ORDER — ACETAMINOPHEN 500 MG
1000 TABLET ORAL ONCE
Refills: 0 | Status: COMPLETED | OUTPATIENT
Start: 2024-08-04 | End: 2024-08-04

## 2024-08-02 RX ORDER — HYDROMORPHONE HCL IN 0.9% NACL 0.2 MG/ML
0.5 PLASTIC BAG, INJECTION (ML) INTRAVENOUS EVERY 6 HOURS
Refills: 0 | Status: DISCONTINUED | OUTPATIENT
Start: 2024-08-02 | End: 2024-08-03

## 2024-08-02 RX ORDER — LORATADINE 10 MG
17 TABLET,DISINTEGRATING ORAL DAILY
Refills: 0 | Status: DISCONTINUED | OUTPATIENT
Start: 2024-08-03 | End: 2024-08-09

## 2024-08-02 RX ORDER — CHLORHEXIDINE GLUCONATE 500 MG/1
15 CLOTH TOPICAL EVERY 12 HOURS
Refills: 0 | Status: DISCONTINUED | OUTPATIENT
Start: 2024-08-02 | End: 2024-08-02

## 2024-08-02 RX ORDER — AMIODARONE HYDROCHLORIDE 50 MG/ML
0.5 INJECTION, SOLUTION INTRAVENOUS
Qty: 450 | Refills: 0 | Status: DISCONTINUED | OUTPATIENT
Start: 2024-08-02 | End: 2024-08-03

## 2024-08-02 RX ORDER — VASOPRESSIN 40 [USP'U]/100ML
0.04 INJECTION INTRAVENOUS
Qty: 40 | Refills: 0 | Status: DISCONTINUED | OUTPATIENT
Start: 2024-08-02 | End: 2024-08-03

## 2024-08-02 RX ORDER — CHLORHEXIDINE GLUCONATE 500 MG/1
15 CLOTH TOPICAL EVERY 12 HOURS
Refills: 0 | Status: DISCONTINUED | OUTPATIENT
Start: 2024-08-02 | End: 2024-08-03

## 2024-08-02 RX ORDER — VANCOMYCIN HYDROCHLORIDE 5 G/100ML
1000 INJECTION, POWDER, LYOPHILIZED, FOR SOLUTION INTRAVENOUS EVERY 12 HOURS
Refills: 0 | Status: COMPLETED | OUTPATIENT
Start: 2024-08-02 | End: 2024-08-03

## 2024-08-02 RX ORDER — CHLORHEXIDINE GLUCONATE 500 MG/1
1 CLOTH TOPICAL DAILY
Refills: 0 | Status: DISCONTINUED | OUTPATIENT
Start: 2024-08-02 | End: 2024-08-02

## 2024-08-02 RX ORDER — DEXMEDETOMIDINE HYDROCHLORIDE 4 UG/ML
1 INJECTION, SOLUTION INTRAVENOUS
Qty: 200 | Refills: 0 | Status: DISCONTINUED | OUTPATIENT
Start: 2024-08-02 | End: 2024-08-03

## 2024-08-02 RX ADMIN — DEXMEDETOMIDINE HYDROCHLORIDE 20.4 MICROGRAM(S)/KG/HR: 4 INJECTION, SOLUTION INTRAVENOUS at 18:27

## 2024-08-02 RX ADMIN — Medication 75 MILLILITER(S): at 17:00

## 2024-08-02 RX ADMIN — FENTANYL CITRATE 50 MICROGRAM(S): 1200 LOZENGE ORAL; TRANSMUCOSAL at 16:04

## 2024-08-02 RX ADMIN — CHLORHEXIDINE GLUCONATE 15 MILLILITER(S): 500 CLOTH TOPICAL at 05:20

## 2024-08-02 RX ADMIN — Medication 400 MILLIGRAM(S): at 20:45

## 2024-08-02 RX ADMIN — CHLORHEXIDINE GLUCONATE 15 MILLILITER(S): 500 CLOTH TOPICAL at 18:26

## 2024-08-02 RX ADMIN — CEFEPIME HYDROCHLORIDE 100 MILLIGRAM(S): 1 INJECTION, POWDER, FOR SOLUTION INTRAMUSCULAR; INTRAVENOUS at 18:08

## 2024-08-02 RX ADMIN — FAMOTIDINE 20 MILLIGRAM(S): 40 TABLET, FILM COATED ORAL at 18:05

## 2024-08-02 RX ADMIN — AMIODARONE HYDROCHLORIDE 16.7 MG/MIN: 50 INJECTION, SOLUTION INTRAVENOUS at 19:03

## 2024-08-02 RX ADMIN — VANCOMYCIN HYDROCHLORIDE 250 MILLIGRAM(S): 5 INJECTION, POWDER, LYOPHILIZED, FOR SOLUTION INTRAVENOUS at 21:35

## 2024-08-02 RX ADMIN — CHLORHEXIDINE GLUCONATE 1 APPLICATION(S): 500 CLOTH TOPICAL at 05:23

## 2024-08-02 RX ADMIN — Medication 88 MICROGRAM(S): at 05:19

## 2024-08-02 RX ADMIN — NITROGLYCERIN 4.5 MICROGRAM(S)/MIN: 400 AEROSOL, METERED SUBLINGUAL at 18:28

## 2024-08-02 RX ADMIN — FENTANYL CITRATE 50 MICROGRAM(S): 1200 LOZENGE ORAL; TRANSMUCOSAL at 16:30

## 2024-08-02 RX ADMIN — Medication 3 MILLILITER(S): at 05:26

## 2024-08-02 RX ADMIN — Medication 100 MILLIGRAM(S): at 22:43

## 2024-08-02 RX ADMIN — POTASSIUM CHLORIDE 100 MILLIEQUIVALENT(S): 1500 TABLET, EXTENDED RELEASE ORAL at 18:05

## 2024-08-02 RX ADMIN — Medication 1000 MILLIGRAM(S): at 21:45

## 2024-08-02 RX ADMIN — Medication 20 MILLILITER(S): at 18:27

## 2024-08-02 RX ADMIN — POTASSIUM CHLORIDE 100 MILLIEQUIVALENT(S): 1500 TABLET, EXTENDED RELEASE ORAL at 18:03

## 2024-08-02 RX ADMIN — Medication 5 UNIT(S)/HR: at 18:27

## 2024-08-02 NOTE — PROGRESS NOTE ADULT - SUBJECTIVE AND OBJECTIVE BOX
OPERATIVE PROCEDURE(s):                POD #                       SURGEON(s): NATI Ealr    HPI:    70 y/o male, current smoker, w/ PMHx of HTN, HLD, hypothyroidism, BPH, CKD (baseline Cr 1.3-1.4), who presented to his cardiologist with c/o SOB on walking uphill and 1-2 flights of stairs, relieved with rest.  Pt also c/o occasional dizziness with postural changes.  He underwent a CCTA on 7/12/24 which revealed a CA score of 722, short segment mixed predominantly dense calcified plaque proximal LAD resulting in moderate to severe narrowing, mixed calcified and noncalcified plaque proximal LCx resulting in moderate to severe narrowing, and mixed predominantly dense calcified plaque proximal and mid RCA resulting in moderate narrowing.  CT FFR 7/25/24 -> LAD 0.59, LCx , OM 0.82, RCA 0.82.   Patient presents today for [facility] with possible intervention if clinically indicated.    Pre cath note:    indication:  [ ] STEMI                [ ] NSTEMI                 [ ] Acute coronary syndrome                     [ ]Unstable Angina   [ ] high risk  [ ] intermediate risk  [ ] low risk                     [ x] Stable Angina     non-invasive testing:    CCTA    Date:    7/12/24    result: [ ] high risk  [x ] intermediate risk  [ ] low risk    Anti- Anginal medications:                    [ ] not used                       [x ] used :   on BBn and Ranexa              [ ] not used but strong indication not to use    Ejection Fraction                   [ ] <29            [ ] 30-39%   [ ] 40-49%     [ ]>50%    CHF                   [ ] active (within last 14 days on meds   [ ] Chronic (on meds but no exacerbation)    COPD                   [ ] mild (on chronic bronchodilators)  [ ] moderate (on chronic steroid therapy)      [ ] severe (indication for home O2 or PACO2 >50)    Other risk factors:                       [ ] Previous MI                     [ ] CVA/ stroke                    [ ] carotid stent/ CEA                    [ ] PVD/PAD- (arterial aneurysm, non-palpable pulses, tortuous vessel with inability to insert catheter, infra-renal dissection, renal or subclavian artery stenosis)                    [ ] diabetic                    [ ] previous CABG                    [x ] Renal Failure       Right Filippo Test: positive    Adjusted Cath Bleeding Risk: 1.5%    Pre-cath Hydration: (x  )  cc IV bolus x 1 over 1 hr followed by ( x ) NS @ 100cc/hr until procedure                                                                                                                             (28 Jul 2024 14:10)      SUBJECTIVE ASSESSMENT:69yMale patient seen and examined at bedside.    Vital Signs Last 24 Hrs  T(F): 96.9 (02 Aug 2024 04:00), Max: 97.3 (02 Aug 2024 00:00)  HR: 57 (02 Aug 2024 06:00) (56 - 69)  BP: 131/79 (02 Aug 2024 06:00) (91/61 - 144/64)  BP(mean): 100 (02 Aug 2024 06:00) (69 - 100)  ABP: --  ABP(mean): --  RR: 17 (02 Aug 2024 06:00) (15 - 57)  SpO2: 97% (02 Aug 2024 06:00) (95% - 99%)  CVP(mm Hg): --  CVP(cm H2O): --  CO: --  CI: --  PA: --  SVR: --    I&O's Detail    01 Aug 2024 07:01  -  02 Aug 2024 07:00  --------------------------------------------------------  IN:    Oral Fluid: 840 mL  Total IN: 840 mL    OUT:    Voided (mL): 1800 mL  Total OUT: 1800 mL        Net: I&O's Detail    31 Jul 2024 07:01  -  01 Aug 2024 07:00  --------------------------------------------------------  Total NET: -400 mL      01 Aug 2024 07:01  -  02 Aug 2024 07:00  --------------------------------------------------------  Total NET: -960 mL        CAPILLARY BLOOD GLUCOSE        A1C with Estimated Average Glucose Result: 5.8 % (07-31-24 @ 16:28)      Physical Exam:  General: NAD; A&Ox3  Cardiac: S1/S2, RRR, no murmur, no rubs  Lungs: unlabored respirations, CTA b/l, no wheeze, no rales, no crackles  Abdomen: Soft/NT/ND; positive bowel sounds x 4  Sternum: Intact, no click, incision healing well with no drainage  Incisions: Incisions clean/dry/intact  Extremities: No edema b/l lower extremities; good capillary refill; no cyanosis; palpable 1+ pedal pulses b/l    Central Venous Catheter: Yes: critical illness, intravenous access  Day #  [place] Catheter: Yes: critical illness; monitor strict i/o's                    Day #  EPICARDIAL WIRES:  YES                                                                         Day #  BOWEL MOVEMENT:  [] YES [] NO, If No, Timing since last BM Day #  CHEST TUBE(MS/Left/Right):  [] YES [] NO, If yes -  AIR LEAKS:  YES/NO        LABS:                        12.2<L>  5.84  )-----------( 175      ( 02 Aug 2024 04:00 )             36.8<L>                        11.9<L>  5.57  )-----------( 183      ( 01 Aug 2024 05:15 )             37.0<L>    08-02    139  |  105  |  19  ----------------------------<  96  4.1   |  24  |  1.1  08-01    140  |  107  |  22<H>  ----------------------------<  78  4.0   |  21  |  0.9    Ca    9.1      02 Aug 2024 04:00  Mg     2.6     08-02    TPro  6.9 [6.0 - 8.0]  /  Alb  4.1 [3.5 - 5.2]  /  TBili  0.3 [0.2 - 1.2]  /  DBili  x   /  AST  15 [0 - 41]  /  ALT  13 [0 - 41]  /  AlkPhos  68 [30 - 115]  08-02    PT/INR - ( 02 Aug 2024 04:00 )   PT: ;   INR: 1.01 ratio         PT/INR - ( 31 Jul 2024 16:28 )   PT: ;   INR: 1.09 ratio         PTT - ( 02 Aug 2024 04:00 )  PTT:32.6 sec, PTT - ( 31 Jul 2024 16:28 )  PTT:37.0 sec  Urinalysis Basic - ( 02 Aug 2024 04:00 )    Color: x / Appearance: x / SG: x / pH: x  Gluc: 96 mg/dL / Ketone: x  / Bili: x / Urobili: x   Blood: x / Protein: x / Nitrite: x   Leuk Esterase: x / RBC: x / WBC x   Sq Epi: x / Non Sq Epi: x / Bacteria: x        RADIOLOGY & ADDITIONAL TESTS:  CXR:   EKG:    Allergies    No Known Allergies    Intolerances      MEDICATIONS  (STANDING):  albumin human  5% IVPB 3000 milliLiter(s) IV Intermittent once  chlorhexidine 4% Liquid 1 Application(s) Topical once  sodium chloride 0.9%. 1000 milliLiter(s) (100 mL/Hr) IV Continuous <Continuous>    MEDICATIONS  (PRN):    Home Medications:  Flomax 0.4 mg oral capsule: 1 cap(s) orally once a day (31 Jul 2024 17:17)  levothyroxine 88 mcg (0.088 mg) oral tablet: 1 tab(s) orally once a day (31 Jul 2024 17:17)  Metoprolol Succinate ER 25 mg oral tablet, extended release: 1 tab(s) orally once a day (31 Jul 2024 17:17)  Ranexa 500 mg oral tablet, extended release: 1 tab(s) orally 2 times a day (31 Jul 2024 17:17)  rosuvastatin 20 mg oral tablet: 1 tab(s) orally once a day (at bedtime) (31 Jul 2024 17:17)      Pharmacologic DVT Prophylaxis [] YES, [] NO: Contraindication:  SCD's: YES b/l    GI Prophylaxis:     Post-Op Beta-Blockers: [] Yes, [] No: contraindication:  Post-Op CCB: [] Yes, [] No: contraindication:  Post-Op Aspirin:  [] Yes, [] No: contraindication:  Post-Op Statin:  [] Yes, [] No: contraindication:    Ambulation/Activity Status:    Assessment/Plan:  69y Male status-post  - Case and plan discussed with CTU Intensivist and CT Surgeon - Dr. Merritt/Boyd/Rajat/Kevin  - Continue CTU supportive care and ongoing plan of care as per continuing CTU rounds.   - Continue DVT/GI prophylaxis  - Incentive Spirometry 10 times an hour  - Continue to advance physical activity as tolerated and continue PT/OT as directed  1. CAD s/p CABG: Continue ASA, statin, BB  2. HTN:   3. Post-op A. Fib ppx:   4. COPD/Hypoxia:   5. DM/Glucose Control:     Social Service Disposition:     OPERATIVE PROCEDURE(s): CABG  Today.                                 SURGEON(s): NATI Earl    HPI: Patient is a 69 year-old current smoker with a past medical history of HTN, HLD. hypothyroidism, BPH, CKD (baseline Cr 1.3-1.4) presented today for an elective cardiac catheterization. Patient states for several months he felt worsening shortness of breath when walking uphill and 1-2 flights of stairs, relieved with rest. Patient also complains of occasional dizziness with postural changes. Patient saw a cardiologist who recommended a CCTA. On 7/12/24, he underwent a CCTA, revealing a score of 722, short segment mixed predominantly dense calcified plaque proximal LAD resulting in moderate to severe narrowing, mixed calcified and noncalcified plaque proximal LCx resulting in moderate to severe narrowing, and mixed predominantly dense calcified plaque proximal and mid RCA resulting in moderate narrowing. After reviewing the results, the Cardiologist recommended the patient undergo a diagnostic cardiac catheterization. Today, patient underwent a cardiac catheterization via right radial artery, revealing 3vCAD. CT Surgery was consulted for an evaluation of myocardial revascularization via bypass grafts.     Healthcare Providers:   PCP: MD Marcial  Cardiology: MD Heard  Pulmonary: MD Gaytan      SUBJECTIVE ASSESSMENT: 69y Male patient seen and examined at bedside.    Vital Signs Last 24 Hrs  T(F): 96.9 (02 Aug 2024 04:00), Max: 97.3 (02 Aug 2024 00:00)  HR: 57 (02 Aug 2024 06:00) (56 - 69)  BP: 131/79 (02 Aug 2024 06:00) (91/61 - 144/64)  BP(mean): 100 (02 Aug 2024 06:00) (69 - 100)  RR: 17 (02 Aug 2024 06:00) (15 - 57)  SpO2: 97% (02 Aug 2024 06:00) (95% - 99%)    I&O's Detail    01 Aug 2024 07:01  -  02 Aug 2024 07:00  --------------------------------------------------------  IN:    Oral Fluid: 840 mL  Total IN: 840 mL    OUT:    Voided (mL): 1800 mL  Total OUT: 1800 mL    Net: I&O's Detail    31 Jul 2024 07:01  -  01 Aug 2024 07:00  --------------------------------------------------------  Total NET: -400 mL    01 Aug 2024 07:01  -  02 Aug 2024 07:00  --------------------------------------------------------  Total NET: -960 mL    Physical Exam:  General: NAD; A&Ox3  Cardiac: S1/S2, RRR, no murmur, no rubs  Lungs: unlabored respirations, CTA b/l, no wheeze, no rales, no crackles  Abdomen: Soft/NT/ND; positive bowel sounds x 4  Incisions: Incisions clean/dry/intact  Extremities: No edema b/l lower extremities; good capillary refill; no cyanosis; palpable 1+ pedal pulses b/l        LABS:                        12.2<L>  5.84  )-----------( 175      ( 02 Aug 2024 04:00 )             36.8<L>                        11.9<L>  5.57  )-----------( 183      ( 01 Aug 2024 05:15 )             37.0<L>    08-02    139  |  105  |  19  ----------------------------<  96  4.1   |  24  |  1.1  08-01    140  |  107  |  22<H>  ----------------------------<  78  4.0   |  21  |  0.9    Ca    9.1      02 Aug 2024 04:00  Mg     2.6     08-02    TPro  6.9 [6.0 - 8.0]  /  Alb  4.1 [3.5 - 5.2]  /  TBili  0.3 [0.2 - 1.2]  /  DBili  x   /  AST  15 [0 - 41]  /  ALT  13 [0 - 41]  /  AlkPhos  68 [30 - 115]  08-02    PT/INR - ( 02 Aug 2024 04:00 )   PT: ;   INR: 1.01 ratio       PT/INR - ( 31 Jul 2024 16:28 )   PT: ;   INR: 1.09 ratio      PTT - ( 02 Aug 2024 04:00 )  PTT:32.6 sec, PTT - ( 31 Jul 2024 16:28 )  PTT:37.0 sec    EKG:  < from: 12 Lead ECG (08.01.24 @ 07:24) >  Ventricular Rate 59 BPM    Atrial Rate 59 BPM    P-R Interval 174 ms    QRS Duration 76 ms    Q-T Interval 428 ms    QTC Calculation(Bazett) 423 ms    P Axis 24 degrees    R Axis -8 degrees    T Axis 37 degrees    Diagnosis Line Sinus Bradycardia  Normal ECG        Carotid Duplex: completed, awaiting results      PFT's: FEV 1 78% predicted, 2.80L        Echocardiogram:  < from: TTE Echo Complete w/ Contrast w/ Doppler (08.01.24 @ 10:17) >  Summary:   1. Normal global left ventricular systolic function.   2. LV Ejection Fraction by Kim's Method with a biplane EF of 58 %.   3. There is normal left ventricular diastolic function, with normal   filling pressure.   4. No evidence of LV thrombus on contrast images.   5. Normal right ventricular size and function.   6. Sclerotic aortic valve with normal opening.   7. No hemodynamically significant valvular regurgitation or stenosis.   8. Adequate TR velocity was not obtained to accurately assess RVSP.   9. Endocardial visualization was enhanced with intravenous echo contrast.    PHYSICIAN INTERPRETATION:  Left Ventricle: Endocardial visualization was enhanced with intravenous   echo contrast. The left ventricular internal cavity size is normal.   Global LV systolic function was normal. Spectral Doppler shows normal   pattern of LV diastolic filling. Normal LV filling pressures. No evidence   of LV thrombus on contrast images.  RightVentricle: Normal right ventricular size and function.  Left Atrium: Normal left atrial size.  Right Atrium: Normal right atrial size.  Pericardium: There is no evidence of pericardial effusion.  Mitral Valve: Structurally normal mitral valve, with normal leaflet   excursion. No evidence of mitral stenosis. Trace mitral valve   regurgitation is seen.  Tricuspid Valve: Structurally normal tricuspid valve, with normal leaflet   excursion. Trivial tricuspid regurgitation is visualized. Adequate TR   velocity was not obtained to accurately assess RVSP.  Aortic Valve: No evidence of aortic stenosis. Sclerotic aortic valve with   normal opening. No evidence of aortic valve regurgitation is seen.  Pulmonic Valve: The pulmonic valve was not well visualized.  Aorta: The aortic root and ascending aorta are structurally normal, with   no evidence of dilitation.  Venous: The inferior vena cava is not well visualized.  In comparison to the previous echocardiogram(s): There are no prior   studies on this patient for comparison purposes.      Cardiac catheterization:  FINDINGS:     Coronary Dominance: Right dominate      LM: Mild calcified disease distally    LAD: 80% ostial LAD calcified disease. 80% lesion distal LAD    CX: 80% stenosis OM1, 80% stenosis distal Lcx, OM3  with distal collaterals from RPL  RCA: Large dominate vessel. 80% lesion Mid RCA (FFR 0.82, delta 0.12)    LVEDP: 8 mmHg     ESTIMATED BLOOD LOSS: < 10 mL         Allergies  No Known Allergies  Intolerances      MEDICATIONS  (STANDING):  albumin human  5% IVPB 3000 milliLiter(s) IV Intermittent once  chlorhexidine 4% Liquid 1 Application(s) Topical once  sodium chloride 0.9%. 1000 milliLiter(s) (100 mL/Hr) IV Continuous <Continuous>    MEDICATIONS  (PRN):    Home Medications:  Flomax 0.4 mg oral capsule: 1 cap(s) orally once a day (31 Jul 2024 17:17)  levothyroxine 88 mcg (0.088 mg) oral tablet: 1 tab(s) orally once a day (31 Jul 2024 17:17)  Metoprolol Succinate ER 25 mg oral tablet, extended release: 1 tab(s) orally once a day (31 Jul 2024 17:17)  Ranexa 500 mg oral tablet, extended release: 1 tab(s) orally 2 times a day (31 Jul 2024 17:17)  rosuvastatin 20 mg oral tablet: 1 tab(s) orally once a day (at bedtime) (31 Jul 2024 17:17)      Assessment/Plan:  69y Male Pre-op for CABG this AM  - Case and plan discussed with CTU Intensivist and CT Surgeon - Dr. Earl.   - Continue supportive care.    - Continue DVT/GI prophylaxis  - Incentive Spirometry 10 times an hour  - Continue to advance physical activity as tolerated and continue PT/OT as directed  - CAD: Continue ASA, statin, BB. CABG this AM.

## 2024-08-02 NOTE — DISCHARGE NOTE PROVIDER - NSDCFUSCHEDAPPT_GEN_ALL_CORE_FT
Silvio Bryant  [facility] Physician [facility]  [address]  Scheduled Appointment: 09/03/2024    Aayush Heard  [facility]  CARDIOLOGY [address] [facility]  Scheduled Appointment: 10/03/2024     Jaime Earl  [facility]  CTSURG [address]  Scheduled Appointment: 08/19/2024    Silvio Bryant  [facility]  [address]  Scheduled Appointment: 09/03/2024    Aayush Heard  [facility]  CARDIOLOGY [address]  Scheduled Appointment: 10/03/2024

## 2024-08-02 NOTE — DISCHARGE NOTE PROVIDER - NSDCFUADDINST_GEN_ALL_CORE_FT
Activities/Restrictions  1.	Do not – drive, lift, pull or push anything over 10 pounds for 8 weeks.  2.	Shower every night and carefully wash wound, pat dry.  Cover wound with dry sterile dressing if some minimal drainage exists. No baths or swimming for two months.   3.	Apply support stockings /ace wraps to legs as soon as you get out of bed in the morning, remove in evening.   4.	Do progressive walking exercises every day, gradually increasing to 30 to 40 minutes/day, five days a week and incentive spirometer 10 times every hour while awake  5.	DO NOT DRIVE OR CONSUME ALCOHOL WHILE TAKING PAIN MEDICATION.  Contact your Physician promptly if:  1.	Signs of wound infection, such as increasing redness, swelling, pain or drainage from incision.  2.	Progressive shortness of breath or increasing difficulty with breathing when lying down.  3.	Excessive nausea, vomiting, diarrhea or coughing.  4.	Increase swelling of legs that does not resolve with leg elevation.  5.	Chest pain that spreads to arms, jaw or back or sudden development of numbness, weakness, difficulty speaking or facial droop – Call 911.  6.	Diabetics who are unable to keep finger stick glucose under 150 for three consecutive readings.  Instructions:  1.	 Keep a daily log for Temperature, pulse, blood pressure, and weight twice a day and Glucose if diabetic with each meal. Call office for Temp > 101, pulse greater than 110 or less than 55, BP first # greater than 160 or less than 100, 3 pound weight gain in 1 day or 5 pounds in 3 days  2.	Hold pillow to chest and grab elbows when you need to cough.  3.                A discussion was conducted with patient/family regarding importance of joining a cardiac rehabilitation program.  Activities/Restrictions  1.	Do not – drive, lift, pull or push anything over 10 pounds for 8 weeks.  2.	Shower every night and carefully wash wound, pat dry.  Cover is wound is draining with dry sterile dressing. No baths or swimming for two months.   3.	Apply support stockings /ace wraps to legs as soon as you get out of bed in the morning, remove in evening.   4.	Do progressive walking exercises every day, gradually increasing to 30 to 40 minutes/day, five days a week and incentive spirometer 10 times every hour while awake  5.	DO NOT DRIVE OR CONSUME ALCOHOL WHILE TAKING PAIN MEDICATION.  Contact your Physician promptly if:  1.	 Signs of wound infection, such as increasing redness, swelling, pain or drainage from incision.  2.	Progressive shortness of breath or increasing difficulty with breathing when lying down.  3.	Excessive nausea, vomiting, diarrhea or coughing.  4.	Increase swelling of legs that does not resolve with leg elevation.  5.	Chest pain that spreads to arms, jaw or back or sudden development of numbness, weakness, difficulty speaking or facial droop – Call 911.  6.	Diabetics who are unable to keep finger stick glucose under 150 for three consecutive readings.  Instructions:  1.	 Keep a daily log for Temperature, pulse, blood pressure, and weight twice a day and Glucose if diabetic with each meal. Call office for Temp > 101, pulse greater than 110 or less than 55, BP first # greater than 160 or less than 100, 3 pound weight gain in 1 day or 5 pounds in 3 days  2.	Hold pillow to chest and grab elbows when you need to cough.

## 2024-08-02 NOTE — DISCHARGE NOTE PROVIDER - PROVIDER TOKENS
PROVIDER:[TOKEN:[[id number]]],PROVIDER:[TOKEN:[[id number]]] PROVIDER:[TOKEN:[[id number]],SCHEDULEDAPPT:[08/19/2024],SCHEDULEDAPPTTIME:[01:30 PM]],PROVIDER:[TOKEN:[[id number]]]

## 2024-08-02 NOTE — BRIEF OPERATIVE NOTE - NSICDXBRIEFPROCEDURE_GEN_ALL_CORE_FT
PROCEDURES:  CABG, with SHAWNA 02-Aug-2024 15:55:20 LIMA to LAD, RSVG to OM1, om2, PDA James Gaston

## 2024-08-02 NOTE — DISCHARGE NOTE PROVIDER - HOSPITAL COURSE
Patient is a 69 year-old current smoker with a past medical history of HTN, HLD. hypothyroidism, BPH, CKD (baseline Cr 1.3-1.4) presented today for an elective cardiac catheterization. Patient states for several months he felt worsening shortness of breath when walking uphill and 1-2 flights of stairs, relieved with rest. Patient also complains of occasional dizziness with postural changes. Patient saw a cardiologist who recommended a CCTA. On 7/12/24, he underwent a CCTA, revealing a score of 722. Patient underwent a cardiac catheterization via right radial artery, revealing 3vCAD. On 08/02/24, he underwent surgical myocardial revascularization. Post-operatively,     A discussion was conducted with the patient and family regarding the importance and benefits of participating in a cardiac rehabilitation program. Program information given to patient with instructions to inquire further upon seeing cardiologist Patient is a 69 year-old current life long smoker with a past medical history of HTN, HLD. hypothyroidism, BPH, CKD (baseline Cr 1.3-1.4) presented today for an elective cardiac catheterization. Patient states for several months he felt worsening shortness of breath when walking uphill and 1-2 flights of stairs, relieved with rest. Patient also complains of occasional dizziness with postural changes. Patient saw a cardiologist who recommended a CCTA. Bullous emphysema on CT scan. On 7/12/24, he underwent a CCTA, revealing a score of 722. Patient underwent a cardiac catheterization via right radial artery, revealing 3vCAD. On 08/02/24, he underwent surgical myocardial revascularization. Post-operatively,     A discussion was conducted with the patient and family regarding the importance and benefits of participating in a cardiac rehabilitation program. Program information given to patient with instructions to inquire further upon seeing cardiologist Patient is a 69 year-old current life long smoker with a past medical history of HTN, HLD. hypothyroidism, BPH, CKD (baseline Cr 1.3-1.4) presented today for an elective cardiac catheterization. Patient states for several months he felt worsening shortness of breath when walking uphill and 1-2 flights of stairs, relieved with rest. Patient also complains of occasional dizziness with postural changes. Patient saw a cardiologist who recommended a CCTA. Bullous emphysema on CT scan. On 7/12/24, he underwent a CCTA, revealing a score of 722. Patient underwent a cardiac catheterization via right radial artery, revealing 3vCAD. On 08/02/24, he underwent surgical myocardial revascularization. Post-operatively, patient tolerated the procedure well. Patient's hospitalization was prolonged due to symptomatic hypotension. Patient's SBP went as low as 80s, BB and other anti-hypertensives held. Patient started on PO midodrine.   Beta bloc          CAD s/p CABG x4  Hypotension, resolved  - POD 6  - ASA + Plavix  - BB, Statin    Respiratory  Room air  Incentive Spirometry 10 times an hour    Renal/  BPH  - continue Flomax 0.4 mg    GI/Nutrition  GI ppx with Pepcid  DASH diet  LBM 8/6    Endocrine  Hypothyroidism  - Cont home dose levothyroxine 88 mcg     ID  Preoperative UTI  Sternal Incision Drainage  - Positive UA on 7/1  - Sternal incision found to have serous drainage.   - Unasyn now, discharge on Augmentin    Neurological  No acute issues    Hematology  - sq Heparin  - SCDs bilaterally      A discussion was conducted with the patient and family regarding the importance and benefits of participating in a cardiac rehabilitation program. Program information given to patient with instructions to inquire further upon seeing cardiologist Patient is a 69 year-old current life long smoker with a past medical history of HTN, HLD. hypothyroidism, BPH, CKD (baseline Cr 1.3-1.4) presented for an elective cardiac catheterization. Patient states for several months he felt worsening shortness of breath when walking uphill and 1-2 flights of stairs, relieved with rest. Patient also complains of occasional dizziness with postural changes. Patient saw a cardiologist who recommended a CCTA. Bullous emphysema on CT scan. On 7/12/24, he underwent a CCTA, revealing a score of 722. Patient underwent a cardiac catheterization via right radial artery, revealing 3vCAD. On 08/02/24, he underwent surgical myocardial revascularization. Post-operatively, patient tolerated the procedure well. Patient's hospitalization was prolonged due to symptomatic hypotension. Patient's SBP went as low as 80s, BB and other anti-hypertensives held. Patient started on PO midodrine. Patient was discharged on 8/9 to [facility] in stable condition with instructions to follow up Dr. Earl on 8/19 at 130pm.     CAD s/p CABG x4  Hypotension  - POD 7  - ASA + Plavix  - Statin  - Patient did not tolerate BB after multiple instances of symptomatic hypotension  - Scheduled midodrine 5 q8      Respiratory  Room air  Incentive Spirometry    Renal/  BPH  - continue Flomax 0.4 mg    GI/Nutrition  GI ppx with Pepcid  DASH diet  LBM 8/6    Endocrine  Hypothyroidism  - Cont home dose levothyroxine 88 mcg     ID  Preoperative UTI  Sternal Incision Drainage  - Positive UA on 7/1  - Sternal incision found to have serous drainage.   - Unasyn now, discharge on Augmentin 875 BID for 10    Neurological  No acute issues    Hematology  - sq Heparin        A discussion was conducted with the patient and family regarding the importance and benefits of participating in a cardiac rehabilitation program. Program information given to patient with instructions to inquire further upon seeing cardiologist

## 2024-08-02 NOTE — DISCHARGE NOTE PROVIDER - CARE PROVIDER_API CALL
Jaime Earl  Thoracic and Cardiac Surgery  [address] [place] [address], [address]  [place] [address]  Phone: [phone]  Fax: [phone]  Follow Up Time:     Bruno Stanton  Interventional Cardiology  [address] [place] [address], [address]  [place] [address]  Phone: [phone]  Fax: [phone]  Follow Up Time:    Jaime Earl  Thoracic and Cardiac Surgery  [address] [place] [address], [address]  [place] [address]  Phone: [phone]  Fax: [phone]  Scheduled Appointment: 08/19/2024 01:30 PM    Bruno Stanton  Interventional Cardiology  [address] [place] [address], [address]  [place] [address]  Phone: [phone]  Fax: [phone]  Follow Up Time:

## 2024-08-02 NOTE — DISCHARGE NOTE PROVIDER - NSDCMRMEDTOKEN_GEN_ALL_CORE_FT
Flomax 0.4 mg oral capsule: 1 cap(s) orally once a day  levothyroxine 88 mcg (0.088 mg) oral tablet: 1 tab(s) orally once a day  Metoprolol Succinate ER 25 mg oral tablet, extended release: 1 tab(s) orally once a day  Ranexa 500 mg oral tablet, extended release: 1 tab(s) orally 2 times a day  rosuvastatin 20 mg oral tablet: 1 tab(s) orally once a day (at bedtime)   aspirin 81 mg oral delayed release tablet: 1 tab(s) orally once a day  Augmentin 875 mg-125 mg oral tablet: 1 tab(s) orally 2 times a day  clopidogrel 75 mg oral tablet: 1 tab(s) orally once a day  famotidine 20 mg oral tablet: 1 tab(s) orally 2 times a day  Flomax 0.4 mg oral capsule: 1 cap(s) orally once a day  heparin: 3,000 unit(s) subcutaneous 2 times a day  levothyroxine 88 mcg (0.088 mg) oral tablet: 1 tab(s) orally once a day  midodrine 5 mg oral tablet: 1 tab(s) orally 3 times a day  polyethylene glycol 3350 oral powder for reconstitution: 17 gram(s) orally once a day  rosuvastatin 20 mg oral tablet: 1 tab(s) orally once a day (at bedtime)   aspirin 81 mg oral delayed release tablet: 1 tab(s) orally once a day  Augmentin 875 mg-125 mg oral tablet: 1 tab(s) orally 2 times a day  clopidogrel 75 mg oral tablet: 1 tab(s) orally once a day  famotidine 20 mg oral tablet: 1 tab(s) orally 2 times a day  Flomax 0.4 mg oral capsule: 1 cap(s) orally once a day  heparin: 3,000 unit(s) subcutaneous 2 times a day  levothyroxine 88 mcg (0.088 mg) oral tablet: 1 tab(s) orally once a day  midodrine 5 mg oral tablet: 1 tab(s) orally 2 times a day  polyethylene glycol 3350 oral powder for reconstitution: 17 gram(s) orally once a day  rosuvastatin 20 mg oral tablet: 1 tab(s) orally once a day (at bedtime)

## 2024-08-02 NOTE — DISCHARGE NOTE PROVIDER - NSDCCPTREATMENT_GEN_ALL_CORE_FT
PRINCIPAL PROCEDURE  Procedure: CABG, with endoscopic vein harvesting  Findings and Treatment: Activities/Restrictions  1.	Do not – drive, lift, pull or push anything over 10 pounds for 8 weeks.  2.	Shower every night and carefully wash wound, pat dry.  Cover is wound is draining with dry sterile dressing. No baths or swimming for two months.   3.	Apply support stockings /ace wraps to legs as soon as you get out of bed in the morning, remove in evening.   4.	Do progressive walking exercises every day, gradually increasing to 30 to 40 minutes/day, five days a week and incentive spirometer 10 times every hour while awake  5.	DO NOT DRIVE OR CONSUME ALCOHOL WHILE TAKING PAIN MEDICATION.  Contact your Physician promptly if:  1.	 Signs of wound infection, such as increasing redness, swelling, pain or drainage from incision.  2.	Progressive shortness of breath or increasing difficulty with breathing when lying down.  3.	Excessive nausea, vomiting, diarrhea or coughing.  4.	Increase swelling of legs that does not resolve with leg elevation.  5.	Chest pain that spreads to arms, jaw or back or sudden development of numbness, weakness, difficulty speaking or facial droop – Call 911.  6.	Diabetics who are unable to keep finger stick glucose under 150 for three consecutive readings.  Instructions:  1.	 Keep a daily log for Temperature, pulse, blood pressure, and weight twice a day and Glucose if diabetic with each meal. Call office for Temp > 101, pulse greater than 110 or less than 55, BP first # greater than 160 or less than 100, 3 pound weight gain in 1 day or 5 pounds in 3 days  2.	Hold pillow to chest and grab elbows when you need to cough.

## 2024-08-02 NOTE — BRIEF OPERATIVE NOTE - OPERATION/FINDINGS
As per dictation  Severe emphysema with bullous disease  Deep chest  Calcified diffuse coronary artery disease  Excellent flow in all the grafts  Tolerated well

## 2024-08-02 NOTE — PRE-OP CHECKLIST - ADVANCE DIRECTIVE ADDRESSED/READDRESSED
What Type Of Note Output Would You Prefer (Optional)?: Bullet Format How Severe Are Your Spot(S)?: mild Have Your Spot(S) Been Treated In The Past?: has not been treated Hpi Title: Evaluation of Skin Lesions Additional History: UBE. done

## 2024-08-02 NOTE — DISCHARGE NOTE PROVIDER - CARE PROVIDERS DIRECT ADDRESSES
[email or web address]

## 2024-08-03 LAB
ALBUMIN SERPL ELPH-MCNC: 3.9 G/DL — SIGNIFICANT CHANGE UP (ref 3.5–5.2)
ALBUMIN SERPL ELPH-MCNC: 4.3 G/DL — SIGNIFICANT CHANGE UP (ref 3.5–5.2)
ALP SERPL-CCNC: 39 U/L — SIGNIFICANT CHANGE UP (ref 30–115)
ALP SERPL-CCNC: 44 U/L — SIGNIFICANT CHANGE UP (ref 30–115)
ALT FLD-CCNC: 17 U/L — SIGNIFICANT CHANGE UP (ref 0–41)
ALT FLD-CCNC: 17 U/L — SIGNIFICANT CHANGE UP (ref 0–41)
ANION GAP SERPL CALC-SCNC: 10 MMOL/L — SIGNIFICANT CHANGE UP (ref 7–14)
ANION GAP SERPL CALC-SCNC: 11 MMOL/L — SIGNIFICANT CHANGE UP (ref 7–14)
APTT BLD: 30.3 SEC — SIGNIFICANT CHANGE UP (ref 27–39.2)
AST SERPL-CCNC: 80 U/L — HIGH (ref 0–41)
AST SERPL-CCNC: 85 U/L — HIGH (ref 0–41)
BASE EXCESS BLDV CALC-SCNC: -2.6 MMOL/L — LOW (ref -2–3)
BASOPHILS # BLD AUTO: 0.02 K/UL — SIGNIFICANT CHANGE UP (ref 0–0.2)
BASOPHILS NFR BLD AUTO: 0.3 % — SIGNIFICANT CHANGE UP (ref 0–1)
BILIRUB SERPL-MCNC: 0.5 MG/DL — SIGNIFICANT CHANGE UP (ref 0.2–1.2)
BILIRUB SERPL-MCNC: 0.6 MG/DL — SIGNIFICANT CHANGE UP (ref 0.2–1.2)
BUN SERPL-MCNC: 14 MG/DL — SIGNIFICANT CHANGE UP (ref 10–20)
BUN SERPL-MCNC: 9 MG/DL — LOW (ref 10–20)
CA-I SERPL-SCNC: 1.16 MMOL/L — SIGNIFICANT CHANGE UP (ref 1.15–1.33)
CALCIUM SERPL-MCNC: 7.6 MG/DL — LOW (ref 8.4–10.5)
CALCIUM SERPL-MCNC: 7.7 MG/DL — LOW (ref 8.4–10.5)
CHLORIDE SERPL-SCNC: 104 MMOL/L — SIGNIFICANT CHANGE UP (ref 98–110)
CHLORIDE SERPL-SCNC: 113 MMOL/L — HIGH (ref 98–110)
CO2 SERPL-SCNC: 19 MMOL/L — SIGNIFICANT CHANGE UP (ref 17–32)
CO2 SERPL-SCNC: 21 MMOL/L — SIGNIFICANT CHANGE UP (ref 17–32)
CREAT SERPL-MCNC: 0.9 MG/DL — SIGNIFICANT CHANGE UP (ref 0.7–1.5)
CREAT SERPL-MCNC: 1 MG/DL — SIGNIFICANT CHANGE UP (ref 0.7–1.5)
EGFR: 82 ML/MIN/1.73M2 — SIGNIFICANT CHANGE UP
EGFR: 94 ML/MIN/1.73M2 — SIGNIFICANT CHANGE UP
EOSINOPHIL # BLD AUTO: 0.01 K/UL — SIGNIFICANT CHANGE UP (ref 0–0.7)
EOSINOPHIL NFR BLD AUTO: 0.1 % — SIGNIFICANT CHANGE UP (ref 0–8)
GAS PNL BLDA: SIGNIFICANT CHANGE UP
GAS PNL BLDV: 139 MMOL/L — SIGNIFICANT CHANGE UP (ref 136–145)
GAS PNL BLDV: SIGNIFICANT CHANGE UP
GAS PNL BLDV: SIGNIFICANT CHANGE UP
GLUCOSE BLDC GLUCOMTR-MCNC: 116 MG/DL — HIGH (ref 70–99)
GLUCOSE BLDC GLUCOMTR-MCNC: 119 MG/DL — HIGH (ref 70–99)
GLUCOSE BLDC GLUCOMTR-MCNC: 121 MG/DL — HIGH (ref 70–99)
GLUCOSE BLDC GLUCOMTR-MCNC: 124 MG/DL — HIGH (ref 70–99)
GLUCOSE BLDC GLUCOMTR-MCNC: 126 MG/DL — HIGH (ref 70–99)
GLUCOSE BLDC GLUCOMTR-MCNC: 143 MG/DL — HIGH (ref 70–99)
GLUCOSE SERPL-MCNC: 114 MG/DL — HIGH (ref 70–99)
GLUCOSE SERPL-MCNC: 127 MG/DL — HIGH (ref 70–99)
HCO3 BLDV-SCNC: 23 MMOL/L — SIGNIFICANT CHANGE UP (ref 22–29)
HCT VFR BLD CALC: 26.2 % — LOW (ref 42–52)
HCT VFR BLDA CALC: 26 % — LOW (ref 39–51)
HGB BLD CALC-MCNC: 8.8 G/DL — LOW (ref 12.6–17.4)
HGB BLD-MCNC: 8.5 G/DL — LOW (ref 14–18)
HOROWITZ INDEX BLDV+IHG-RTO: 40 — SIGNIFICANT CHANGE UP
IMM GRANULOCYTES NFR BLD AUTO: 0.3 % — SIGNIFICANT CHANGE UP (ref 0.1–0.3)
INR BLD: 1.22 RATIO — SIGNIFICANT CHANGE UP (ref 0.65–1.3)
LACTATE BLDV-MCNC: 0.5 MMOL/L — SIGNIFICANT CHANGE UP (ref 0.5–2)
LYMPHOCYTES # BLD AUTO: 0.44 K/UL — LOW (ref 1.2–3.4)
LYMPHOCYTES # BLD AUTO: 6.3 % — LOW (ref 20.5–51.1)
MAGNESIUM SERPL-MCNC: 2.3 MG/DL — SIGNIFICANT CHANGE UP (ref 1.8–2.4)
MAGNESIUM SERPL-MCNC: 2.4 MG/DL — SIGNIFICANT CHANGE UP (ref 1.8–2.4)
MCHC RBC-ENTMCNC: 30.1 PG — SIGNIFICANT CHANGE UP (ref 27–31)
MCHC RBC-ENTMCNC: 32.4 G/DL — SIGNIFICANT CHANGE UP (ref 32–37)
MCV RBC AUTO: 92.9 FL — SIGNIFICANT CHANGE UP (ref 80–94)
MONOCYTES # BLD AUTO: 0.48 K/UL — SIGNIFICANT CHANGE UP (ref 0.1–0.6)
MONOCYTES NFR BLD AUTO: 6.9 % — SIGNIFICANT CHANGE UP (ref 1.7–9.3)
NEUTROPHILS # BLD AUTO: 5.98 K/UL — SIGNIFICANT CHANGE UP (ref 1.4–6.5)
NEUTROPHILS NFR BLD AUTO: 86.1 % — HIGH (ref 42.2–75.2)
NRBC # BLD: 0 /100 WBCS — SIGNIFICANT CHANGE UP (ref 0–0)
PCO2 BLDV: 40 MMHG — LOW (ref 42–55)
PH BLDV: 7.36 — SIGNIFICANT CHANGE UP (ref 7.32–7.43)
PLATELET # BLD AUTO: 109 K/UL — LOW (ref 130–400)
PMV BLD: 10.1 FL — SIGNIFICANT CHANGE UP (ref 7.4–10.4)
PO2 BLDV: 37 MMHG — SIGNIFICANT CHANGE UP (ref 25–45)
POTASSIUM BLDV-SCNC: 4.4 MMOL/L — SIGNIFICANT CHANGE UP (ref 3.5–5.1)
POTASSIUM SERPL-MCNC: 4 MMOL/L — SIGNIFICANT CHANGE UP (ref 3.5–5)
POTASSIUM SERPL-MCNC: 4.6 MMOL/L — SIGNIFICANT CHANGE UP (ref 3.5–5)
POTASSIUM SERPL-SCNC: 4 MMOL/L — SIGNIFICANT CHANGE UP (ref 3.5–5)
POTASSIUM SERPL-SCNC: 4.6 MMOL/L — SIGNIFICANT CHANGE UP (ref 3.5–5)
PROT SERPL-MCNC: 5.5 G/DL — LOW (ref 6–8)
PROT SERPL-MCNC: 5.6 G/DL — LOW (ref 6–8)
PROTHROM AB SERPL-ACNC: 13.9 SEC — HIGH (ref 9.95–12.87)
RBC # BLD: 2.82 M/UL — LOW (ref 4.7–6.1)
RBC # FLD: 14.3 % — SIGNIFICANT CHANGE UP (ref 11.5–14.5)
SAO2 % BLDV: 68.3 % — SIGNIFICANT CHANGE UP (ref 67–88)
SODIUM SERPL-SCNC: 135 MMOL/L — SIGNIFICANT CHANGE UP (ref 135–146)
SODIUM SERPL-SCNC: 143 MMOL/L — SIGNIFICANT CHANGE UP (ref 135–146)
WBC # BLD: 6.95 K/UL — SIGNIFICANT CHANGE UP (ref 4.8–10.8)
WBC # FLD AUTO: 6.95 K/UL — SIGNIFICANT CHANGE UP (ref 4.8–10.8)

## 2024-08-03 PROCEDURE — 99233 SBSQ HOSP IP/OBS HIGH 50: CPT

## 2024-08-03 PROCEDURE — 71045 X-RAY EXAM CHEST 1 VIEW: CPT | Mod: 26

## 2024-08-03 PROCEDURE — 93010 ELECTROCARDIOGRAM REPORT: CPT

## 2024-08-03 RX ORDER — INSULIN LISPRO 100/ML
VIAL (ML) SUBCUTANEOUS
Refills: 0 | Status: DISCONTINUED | OUTPATIENT
Start: 2024-08-03 | End: 2024-08-04

## 2024-08-03 RX ORDER — LEVOTHYROXINE SODIUM 175 MCG
88 TABLET ORAL DAILY
Refills: 0 | Status: DISCONTINUED | OUTPATIENT
Start: 2024-08-03 | End: 2024-08-09

## 2024-08-03 RX ORDER — HEPARIN SODIUM 1000 [USP'U]/ML
3000 INJECTION, SOLUTION INTRAVENOUS; SUBCUTANEOUS EVERY 12 HOURS
Refills: 0 | Status: DISCONTINUED | OUTPATIENT
Start: 2024-08-03 | End: 2024-08-09

## 2024-08-03 RX ORDER — GLUCAGON INJECTION, SOLUTION 0.5 MG/.1ML
1 INJECTION, SOLUTION SUBCUTANEOUS ONCE
Refills: 0 | Status: DISCONTINUED | OUTPATIENT
Start: 2024-08-03 | End: 2024-08-09

## 2024-08-03 RX ORDER — FAMOTIDINE 40 MG/1
20 TABLET, FILM COATED ORAL
Refills: 0 | Status: DISCONTINUED | OUTPATIENT
Start: 2024-08-03 | End: 2024-08-09

## 2024-08-03 RX ORDER — TAMSULOSIN HCL 0.4 MG
0.4 CAPSULE ORAL AT BEDTIME
Refills: 0 | Status: DISCONTINUED | OUTPATIENT
Start: 2024-08-03 | End: 2024-08-09

## 2024-08-03 RX ORDER — DEXTROSE MONOHYDRATE, SODIUM CHLORIDE, SODIUM LACTATE, CALCIUM CHLORIDE, MAGNESIUM CHLORIDE 1.5; 538; 448; 18.4; 5.08 G/100ML; MG/100ML; MG/100ML; MG/100ML; MG/100ML
1000 SOLUTION INTRAPERITONEAL
Refills: 0 | Status: DISCONTINUED | OUTPATIENT
Start: 2024-08-03 | End: 2024-08-09

## 2024-08-03 RX ORDER — ASPIRIN 500 MG
325 TABLET ORAL DAILY
Refills: 0 | Status: DISCONTINUED | OUTPATIENT
Start: 2024-08-03 | End: 2024-08-08

## 2024-08-03 RX ORDER — IPRATROPIUM BROMIDE AND ALBUTEROL SULFATE 2.5; .5 MG/3ML; MG/3ML
3 SOLUTION RESPIRATORY (INHALATION) EVERY 6 HOURS
Refills: 0 | Status: DISCONTINUED | OUTPATIENT
Start: 2024-08-03 | End: 2024-08-09

## 2024-08-03 RX ORDER — METOPROLOL TARTRATE 100 MG
12.5 TABLET ORAL EVERY 12 HOURS
Refills: 0 | Status: DISCONTINUED | OUTPATIENT
Start: 2024-08-03 | End: 2024-08-06

## 2024-08-03 RX ORDER — MELATONIN 3 MG
5 TABLET ORAL AT BEDTIME
Refills: 0 | Status: DISCONTINUED | OUTPATIENT
Start: 2024-08-03 | End: 2024-08-09

## 2024-08-03 RX ORDER — ATORVASTATIN CALCIUM 40 MG/1
80 TABLET, FILM COATED ORAL AT BEDTIME
Refills: 0 | Status: DISCONTINUED | OUTPATIENT
Start: 2024-08-03 | End: 2024-08-09

## 2024-08-03 RX ORDER — DEXTROSE 4 G
15 TABLET,CHEWABLE ORAL ONCE
Refills: 0 | Status: DISCONTINUED | OUTPATIENT
Start: 2024-08-03 | End: 2024-08-06

## 2024-08-03 RX ORDER — ONDANSETRON HCL/PF 4 MG/2 ML
4 VIAL (ML) INJECTION EVERY 6 HOURS
Refills: 0 | Status: DISCONTINUED | OUTPATIENT
Start: 2024-08-03 | End: 2024-08-09

## 2024-08-03 RX ADMIN — Medication 325 MILLIGRAM(S): at 12:34

## 2024-08-03 RX ADMIN — FENTANYL CITRATE 50 MICROGRAM(S): 1200 LOZENGE ORAL; TRANSMUCOSAL at 01:41

## 2024-08-03 RX ADMIN — OXYCODONE HYDROCHLORIDE 5 MILLIGRAM(S): 30 TABLET ORAL at 20:14

## 2024-08-03 RX ADMIN — CEFEPIME HYDROCHLORIDE 100 MILLIGRAM(S): 1 INJECTION, POWDER, FOR SOLUTION INTRAMUSCULAR; INTRAVENOUS at 05:22

## 2024-08-03 RX ADMIN — FAMOTIDINE 20 MILLIGRAM(S): 40 TABLET, FILM COATED ORAL at 05:22

## 2024-08-03 RX ADMIN — Medication 0.4 MILLIGRAM(S): at 22:22

## 2024-08-03 RX ADMIN — Medication 12.5 MILLIGRAM(S): at 17:10

## 2024-08-03 RX ADMIN — OXYCODONE HYDROCHLORIDE 5 MILLIGRAM(S): 30 TABLET ORAL at 08:44

## 2024-08-03 RX ADMIN — Medication 1000 MILLIGRAM(S): at 03:45

## 2024-08-03 RX ADMIN — Medication 400 MILLIGRAM(S): at 08:44

## 2024-08-03 RX ADMIN — Medication 0.5 MILLIGRAM(S): at 05:22

## 2024-08-03 RX ADMIN — Medication 1000 MILLIGRAM(S): at 09:14

## 2024-08-03 RX ADMIN — SENNOSIDES 2 TABLET(S): 8.6 TABLET ORAL at 22:22

## 2024-08-03 RX ADMIN — FENTANYL CITRATE 50 MICROGRAM(S): 1200 LOZENGE ORAL; TRANSMUCOSAL at 02:41

## 2024-08-03 RX ADMIN — CHLORHEXIDINE GLUCONATE 15 MILLILITER(S): 500 CLOTH TOPICAL at 05:22

## 2024-08-03 RX ADMIN — Medication 100 MILLIGRAM(S): at 22:22

## 2024-08-03 RX ADMIN — Medication 17 GRAM(S): at 12:34

## 2024-08-03 RX ADMIN — IPRATROPIUM BROMIDE AND ALBUTEROL SULFATE 3 MILLILITER(S): 2.5; .5 SOLUTION RESPIRATORY (INHALATION) at 14:13

## 2024-08-03 RX ADMIN — ATORVASTATIN CALCIUM 80 MILLIGRAM(S): 40 TABLET, FILM COATED ORAL at 22:22

## 2024-08-03 RX ADMIN — Medication 1000 MILLIGRAM(S): at 22:55

## 2024-08-03 RX ADMIN — Medication 12.5 MILLIGRAM(S): at 09:25

## 2024-08-03 RX ADMIN — VANCOMYCIN HYDROCHLORIDE 250 MILLIGRAM(S): 5 INJECTION, POWDER, LYOPHILIZED, FOR SOLUTION INTRAVENOUS at 17:10

## 2024-08-03 RX ADMIN — FAMOTIDINE 20 MILLIGRAM(S): 40 TABLET, FILM COATED ORAL at 17:09

## 2024-08-03 RX ADMIN — HEPARIN SODIUM 3000 UNIT(S): 1000 INJECTION, SOLUTION INTRAVENOUS; SUBCUTANEOUS at 17:10

## 2024-08-03 RX ADMIN — Medication 400 MILLIGRAM(S): at 02:45

## 2024-08-03 RX ADMIN — OXYCODONE HYDROCHLORIDE 5 MILLIGRAM(S): 30 TABLET ORAL at 09:14

## 2024-08-03 RX ADMIN — Medication 1000 MILLIGRAM(S): at 14:53

## 2024-08-03 RX ADMIN — OXYCODONE HYDROCHLORIDE 5 MILLIGRAM(S): 30 TABLET ORAL at 21:14

## 2024-08-03 RX ADMIN — CEFEPIME HYDROCHLORIDE 100 MILLIGRAM(S): 1 INJECTION, POWDER, FOR SOLUTION INTRAMUSCULAR; INTRAVENOUS at 17:11

## 2024-08-03 RX ADMIN — Medication 88 MICROGRAM(S): at 09:24

## 2024-08-03 RX ADMIN — Medication 100 MILLIGRAM(S): at 14:46

## 2024-08-03 RX ADMIN — VANCOMYCIN HYDROCHLORIDE 250 MILLIGRAM(S): 5 INJECTION, POWDER, LYOPHILIZED, FOR SOLUTION INTRAVENOUS at 05:21

## 2024-08-03 RX ADMIN — Medication 100 MILLIGRAM(S): at 05:21

## 2024-08-03 RX ADMIN — Medication 0.5 MILLIGRAM(S): at 06:22

## 2024-08-03 RX ADMIN — Medication 400 MILLIGRAM(S): at 14:23

## 2024-08-03 RX ADMIN — Medication 400 MILLIGRAM(S): at 21:57

## 2024-08-03 NOTE — PHYSICAL THERAPY INITIAL EVALUATION ADULT - SPECIFY REASON(S)
Pt in OR, PT to f/u once pt has been medically cleared for OOB with PT.
Hold PT at this time. Pt. currently with femoral line pending removal today (8/3/2024). Will f/u with PT as appropriate.

## 2024-08-03 NOTE — PROGRESS NOTE ADULT - SUBJECTIVE AND OBJECTIVE BOX
CTU Attending Progress Daily Note     03 Aug 2024 13:33  POD# - 1  He has history of Hypothyroidism, unspecified type    High cholesterol    HTN (hypertension)    BPH (benign prostatic hyperplasia)    Chronic kidney disease (CKD)      Interval event for past 24 hr:  FLAVIA TOVAR  69y had no event.   Current Complains:  FLAVIA TOVAR has no new complains  HPI:    70 y/o male, current smoker, w/ PMHx of HTN, HLD, hypothyroidism, BPH, CKD (baseline Cr 1.3-1.4), who presented to his cardiologist with c/o SOB on walking uphill and 1-2 flights of stairs, relieved with rest.  Pt also c/o occasional dizziness with postural changes.  He underwent a CCTA on 7/12/24 which revealed a CA score of 722, short segment mixed predominantly dense calcified plaque proximal LAD resulting in moderate to severe narrowing, mixed calcified and noncalcified plaque proximal LCx resulting in moderate to severe narrowing, and mixed predominantly dense calcified plaque proximal and mid RCA resulting in moderate narrowing.  CT FFR 7/25/24 -> LAD 0.59, LCx , OM 0.82, RCA 0.82.   Patient presents today for [facility] with possible intervention if clinically indicated.    Pre cath note:    indication:  [ ] STEMI                [ ] NSTEMI                 [ ] Acute coronary syndrome                     [ ]Unstable Angina   [ ] high risk  [ ] intermediate risk  [ ] low risk                     [ x] Stable Angina     non-invasive testing:    CCTA    Date:    7/12/24    result: [ ] high risk  [x ] intermediate risk  [ ] low risk    Anti- Anginal medications:                    [ ] not used                       [x ] used :   on BBn and Ranexa              [ ] not used but strong indication not to use    Ejection Fraction                   [ ] <29            [ ] 30-39%   [ ] 40-49%     [ ]>50%    CHF                   [ ] active (within last 14 days on meds   [ ] Chronic (on meds but no exacerbation)    COPD                   [ ] mild (on chronic bronchodilators)  [ ] moderate (on chronic steroid therapy)      [ ] severe (indication for home O2 or PACO2 >50)    Other risk factors:                       [ ] Previous MI                     [ ] CVA/ stroke                    [ ] carotid stent/ CEA                    [ ] PVD/PAD- (arterial aneurysm, non-palpable pulses, tortuous vessel with inability to insert catheter, infra-renal dissection, renal or subclavian artery stenosis)                    [ ] diabetic                    [ ] previous CABG                    [x ] Renal Failure       Right Filippo Test: positive    Adjusted Cath Bleeding Risk: 1.5%    Pre-cath Hydration: (x  )  cc IV bolus x 1 over 1 hr followed by ( x ) NS @ 100cc/hr until procedure                                                                                                                             (28 Jul 2024 14:10)    OBJECTIVE:  ICU Vital Signs Last 24 Hrs  T(C): 37.3 (03 Aug 2024 08:00), Max: 37.6 (03 Aug 2024 04:00)  T(F): 99.1 (03 Aug 2024 08:00), Max: 99.7 (03 Aug 2024 04:00)  HR: 77 (03 Aug 2024 12:30) (70 - 98)  BP: 110/66 (03 Aug 2024 06:00) (94/58 - 133/84)  BP(mean): 83 (03 Aug 2024 06:00) (71 - 102)  ABP: 119/58 (03 Aug 2024 12:30) (-21/-21 - 164/71)  ABP(mean): 77 (03 Aug 2024 12:30) (-21 - 108)  RR: 38 (03 Aug 2024 12:30) (11 - 38)  SpO2: 97% (03 Aug 2024 12:30) (95% - 100%)    O2 Parameters below as of 03 Aug 2024 13:00  Patient On (Oxygen Delivery Method): nasal cannula  O2 Flow (L/min): 4        I&O's Summary    02 Aug 2024 07:01  -  03 Aug 2024 07:00  --------------------------------------------------------  IN: 2909.4 mL / OUT: 2791 mL / NET: 118.4 mL    03 Aug 2024 07:01  -  03 Aug 2024 13:33  --------------------------------------------------------  IN: 931.4 mL / OUT: 1029 mL / NET: -97.6 mL      I&O's Detail    02 Aug 2024 07:01  -  03 Aug 2024 07:00  --------------------------------------------------------  IN:    Albumin 5%  - 500 mL: 825 mL    Amiodarone: 200.4 mL    Dexmedetomidine: 75 mL    Insulin: 24 mL    IV PiggyBack: 100 mL    IV PiggyBack: 350 mL    IV PiggyBack: 50 mL    IV PiggyBack: 100 mL    IV PiggyBack: 500 mL    NiCARdipine: 30 mL    Nitroglycerin: 215 mL    Oral Fluid: 100 mL    sodium chloride 0.9%: 340 mL  Total IN: 2909.4 mL    OUT:    Chest Tube (mL): 106 mL    Chest Tube (mL): 410 mL    Indwelling Catheter - Urethral (mL): 2275 mL    Nasogastric/Oral tube (mL): 0 mL  Total OUT: 2791 mL    Total NET: 118.4 mL      03 Aug 2024 07:01  -  03 Aug 2024 13:33  --------------------------------------------------------  IN:    Amiodarone: 33.4 mL    Insulin: 4 mL    IV PiggyBack: 100 mL    Nitroglycerin: 150 mL    Oral Fluid: 524 mL    sodium chloride 0.9%: 120 mL  Total IN: 931.4 mL    OUT:    Chest Tube (mL): 110 mL    Chest Tube (mL): 44 mL    Dexmedetomidine: 0 mL    Indwelling Catheter - Urethral (mL): 875 mL    NiCARdipine: 0 mL    Vasopressin: 0 mL  Total OUT: 1029 mL    Total NET: -97.6 mL        Adult Advanced Hemodynamics Last 24 Hrs  CVP(mm Hg): 3 (03 Aug 2024 10:30) (0 - 347)  CVP(cm H2O): --  CO: 6.2 (03 Aug 2024 10:00) (5.1 - 8.3)  CI: 3.2 (03 Aug 2024 10:00) (2.6 - 4.2)  PA: 21/5 (03 Aug 2024 10:30) (18/6 - 37/17)  PA(mean): 13 (03 Aug 2024 10:30) (10 - 25)  PCWP: --  SVR: 850 (03 Aug 2024 10:00) (695 - 985)  SVRI: 1647 (03 Aug 2024 10:00) (1372 - 1907)  PVR: --  PVRI: --  Mode: standby  FiO2: 40    CAPILLARY BLOOD GLUCOSE      POCT Blood Glucose.: 121 mg/dL (03 Aug 2024 12:27)  POCT Blood Glucose.: 126 mg/dL (03 Aug 2024 08:56)  POCT Blood Glucose.: 143 mg/dL (03 Aug 2024 06:59)  POCT Blood Glucose.: 124 mg/dL (03 Aug 2024 01:01)  POCT Blood Glucose.: 106 mg/dL (02 Aug 2024 23:07)  POCT Blood Glucose.: 86 mg/dL (02 Aug 2024 21:28)  POCT Blood Glucose.: 127 mg/dL (02 Aug 2024 20:08)  POCT Blood Glucose.: 166 mg/dL (02 Aug 2024 18:54)  POCT Blood Glucose.: 170 mg/dL (02 Aug 2024 18:19)  POCT Blood Glucose.: 174 mg/dL (02 Aug 2024 17:33)  POCT Blood Glucose.: 145 mg/dL (02 Aug 2024 16:30)    LABS:  ABG - ( 03 Aug 2024 03:59 )  pH, Arterial: 7.39  pH, Blood: x     /  pCO2: 34    /  pO2: 125   / HCO3: 21    / Base Excess: -3.9  /  SaO2: 98.5                                    8.5    6.95  )-----------( 109      ( 03 Aug 2024 01:30 )             26.2     08-03    143  |  113<H>  |  14  ----------------------------<  114<H>  4.6   |  19  |  1.0    Ca    7.6<L>      03 Aug 2024 01:30  Mg     2.4     08-03    TPro  5.6<L>  /  Alb  4.3  /  TBili  0.5  /  DBili  x   /  AST  85<H>  /  ALT  17  /  AlkPhos  39  08-03    PT/INR - ( 03 Aug 2024 01:30 )   PT: 13.90 sec;   INR: 1.22 ratio         PTT - ( 03 Aug 2024 01:30 )  PTT:30.3 sec  Urinalysis Basic - ( 03 Aug 2024 01:30 )    Color: x / Appearance: x / SG: x / pH: x  Gluc: 114 mg/dL / Ketone: x  / Bili: x / Urobili: x   Blood: x / Protein: x / Nitrite: x   Leuk Esterase: x / RBC: x / WBC x   Sq Epi: x / Non Sq Epi: x / Bacteria: x        Home Medications:  Flomax 0.4 mg oral capsule: 1 cap(s) orally once a day (31 Jul 2024 17:17)  levothyroxine 88 mcg (0.088 mg) oral tablet: 1 tab(s) orally once a day (31 Jul 2024 17:17)  Metoprolol Succinate ER 25 mg oral tablet, extended release: 1 tab(s) orally once a day (31 Jul 2024 17:17)  Ranexa 500 mg oral tablet, extended release: 1 tab(s) orally 2 times a day (31 Jul 2024 17:17)  rosuvastatin 20 mg oral tablet: 1 tab(s) orally once a day (at bedtime) (31 Jul 2024 17:17)    HOSPITAL MEDICATIONS:  MEDICATIONS  (STANDING):  acetaminophen   IVPB .. 1000 milliGRAM(s) IV Intermittent once  acetaminophen   IVPB .. 1000 milliGRAM(s) IV Intermittent once  acetaminophen   IVPB .. 1000 milliGRAM(s) IV Intermittent once  albuterol/ipratropium for Nebulization 3 milliLiter(s) Nebulizer every 6 hours  aspirin 325 milliGRAM(s) Oral daily  atorvastatin 80 milliGRAM(s) Oral at bedtime  ceFAZolin   IVPB 2000 milliGRAM(s) IV Intermittent every 8 hours  cefepime   IVPB 2000 milliGRAM(s) IV Intermittent every 12 hours  dextrose 5%. 1000 milliLiter(s) (50 mL/Hr) IV Continuous <Continuous>  dextrose 5%. 1000 milliLiter(s) (100 mL/Hr) IV Continuous <Continuous>  dextrose 50% Injectable 50 milliLiter(s) IV Push every 15 minutes  dextrose 50% Injectable 25 milliLiter(s) IV Push every 15 minutes  famotidine    Tablet 20 milliGRAM(s) Oral two times a day  glucagon  Injectable 1 milliGRAM(s) IntraMuscular once  heparin   Injectable 3000 Unit(s) SubCutaneous every 12 hours  insulin lispro (ADMELOG) corrective regimen sliding scale   SubCutaneous three times a day before meals  levothyroxine 88 MICROGram(s) Oral daily  meperidine     Injectable 25 milliGRAM(s) IV Push once  metoprolol tartrate 12.5 milliGRAM(s) Oral every 12 hours  nitroglycerin  Infusion 15 MICROgram(s)/Min (4.5 mL/Hr) IV Continuous <Continuous>  polyethylene glycol 3350 17 Gram(s) Oral daily  senna 2 Tablet(s) Oral at bedtime  sodium chloride 0.9%. 1000 milliLiter(s) (20 mL/Hr) IV Continuous <Continuous>  tamsulosin 0.4 milliGRAM(s) Oral at bedtime  vancomycin  IVPB 1000 milliGRAM(s) IV Intermittent every 12 hours    MEDICATIONS  (PRN):  dextrose Oral Gel 15 Gram(s) Oral once PRN Blood Glucose LESS THAN 70 milliGRAM(s)/deciliter  HYDROmorphone  Injectable 0.5 milliGRAM(s) IV Push every 6 hours PRN Breakthrough Pain  melatonin 5 milliGRAM(s) Oral at bedtime PRN Sleep  ondansetron Injectable 4 milliGRAM(s) IV Push every 6 hours PRN Nausea and/or Vomiting  oxyCODONE    IR 10 milliGRAM(s) Oral every 4 hours PRN Severe Pain (7 - 10)  oxyCODONE    IR 5 milliGRAM(s) Oral every 4 hours PRN Moderate Pain (4 - 6)      REVIEW OF SYSTEMS:  CONSTITUTIONAL: [X] all negative; [ ] weakness, [ ] fevers, [ ] chills  EYES/ENT: [X] all negative; [ ] visual changes, [ ] vertigo, [ ] throat pain   NECK: [X] all negative; [ ] pain, [ ] stiffness  RESPIRATORY: [] all negative, [ ] cough, [ ] wheezing, [ ] hemoptysis, [ ] shortness of breath  CARDIOVASCULAR: [] all negative; [ ] chest pain, [ ] palpitations, [ ] orthopnea  GASTROINTESTINAL: [X] all negative; [ ]abdominal pain, [ ] nausea, [ ] vomiting, [ ] hematemesis, [ ] diarrhea, [ ] constipation, [ ] melena, [ ] hematochezia.  GENITOURINARY: [X] all negative; [ ] dysuria, [ ] frequency, [ ] hematuria  NEUROLOGICAL: [X] all negative; [ ] numbness, [ ] weakness  SKIN: [X] all negative; [ ] itching, [ ] burning, [ ] rashes, [ ] lesions   All other review of systems is negative unless indicated above.    [  ] Unable to assess ROS because     PHYSICAL EXAM:          CONSTITUTIONAL: Well-developed; well-nourished; in no acute distress.   	SKIN: warm, dry  	HEAD: Normocephalic; atraumatic.  	EYES: PERRL, EOM, no conj injection, sclera clear  	ENT: No nasal discharge; airway clear.  	NECK: Supple; non tender.  No midline ttp ctls  	CARD: S1, S2 normal; no murmurs, gallops, or rubs. Regular rate and rhythm. 2+ RPs and DPs bilat, no carotid bruits, no pedal   edema, no calf pain b/l  	RESP: CTA  bilat good air movement No wheezes, rales or rhonchi.  	ABD: Soft, not tender, not distended, no CVA ttp no rebound or guarding, bowel sounds present  	EXT: Normal ROM.  No clubbing, cyanosis or edema.   	  	NEURO: Alert, awake, motor 5/5 R, 5/5 L        RADIOLOGY:  xray  < from: Xray Chest 1 View- PORTABLE-Routine (08.03.24 @ 04:17) >  Impression:    Unchanged bibasilar focal atelectasis        --- End of Report ---    < end of copied text >    I spent 45 minutes of critical care time ; more than 50% of visit was spent counseling and/or examining patient, reviewing vitals, labs, medications, imaging and discussing with the team goals of care to prevent life-threatening in this patient who is at high risk for deterioration or death due to:

## 2024-08-03 NOTE — PROGRESS NOTE ADULT - SUBJECTIVE AND OBJECTIVE BOX
OPERATIVE PROCEDURE(s):  CABGx4              POD # 1                      SURGEON(s): NATI Earl    HPI:    Patient is a 69 year-old current life long smoker with a past medical history of HTN, HLD. hypothyroidism, BPH, CKD (baseline Cr 1.3-1.4) presented today for an elective cardiac catheterization. Patient states for several months he felt worsening shortness of breath when walking uphill and 1-2 flights of stairs, relieved with rest. Patient also complains of occasional dizziness with postural changes. Patient saw a cardiologist who recommended a CCTA. Bullous emphysema on CT scan. On 7/12/24, he underwent a CCTA, revealing a score of 722. Patient underwent a cardiac catheterization via right radial artery, revealing 3vCAD. On 08/02/24, he underwent surgical myocardial revascularization.      SUBJECTIVE ASSESSMENT:69yMale patient seen and examined at bedside.    Vital Signs Last 24 Hrs  T(F): 99.7 (03 Aug 2024 04:00), Max: 99.7 (03 Aug 2024 04:00)  HR: 74 (03 Aug 2024 06:00) (72 - 98)  BP: 110/66 (03 Aug 2024 06:00) (94/58 - 133/84)  BP(mean): 83 (03 Aug 2024 06:00) (71 - 102)  ABP: 107/56 (03 Aug 2024 06:00) (44/44 - 164/71)  ABP(mean): 73 (03 Aug 2024 06:00)  RR: 19 (03 Aug 2024 06:00) (11 - 32)  SpO2: 100% (03 Aug 2024 06:00) (95% - 100%)  CVP(mm Hg): 3 (03 Aug 2024 06:00)  CVP(cm H2O): --  CO: 7.3 (03 Aug 2024 03:22)  CI: 3.7 (03 Aug 2024 03:22)  PA: 28/7 (03 Aug 2024 06:00)  SVR: 820 (03 Aug 2024 03:22)  Mode: standby  FiO2: 40    I&O's Detail    01 Aug 2024 07:01  -  02 Aug 2024 07:00  --------------------------------------------------------  IN:    Oral Fluid: 840 mL  Total IN: 840 mL    OUT:    Voided (mL): 1800 mL  Total OUT: 1800 mL    Net: I&O's Detail    31 Jul 2024 07:01  -  01 Aug 2024 07:00  --------------------------------------------------------  Total NET: -400 mL      01 Aug 2024 07:01  -  02 Aug 2024 07:00  --------------------------------------------------------  Total NET: -960 mL      CAPILLARY BLOOD GLUCOSE  POCT Blood Glucose.: 124 mg/dL (03 Aug 2024 01:01)  POCT Blood Glucose.: 106 mg/dL (02 Aug 2024 23:07)  POCT Blood Glucose.: 86 mg/dL (02 Aug 2024 21:28)  POCT Blood Glucose.: 127 mg/dL (02 Aug 2024 20:08)  POCT Blood Glucose.: 166 mg/dL (02 Aug 2024 18:54)  POCT Blood Glucose.: 170 mg/dL (02 Aug 2024 18:19)  POCT Blood Glucose.: 174 mg/dL (02 Aug 2024 17:33)  POCT Blood Glucose.: 145 mg/dL (02 Aug 2024 16:30)    A1C with Estimated Average Glucose Result: 5.8 % (07-31-24 @ 16:28)      Physical Exam:  General: NAD; A&Ox3  Cardiac: S1/S2, RRR, no murmur, no rubs  Lungs: unlabored respirations, CTA b/l, no wheeze, no rales, no crackles  Abdomen: Soft/NT/ND; positive bowel sounds x 4  Sternum: Intact, no click, incision healing well with no drainage  Incisions: Incisions clean/dry/intact  Extremities: No edema b/l lower extremities; good capillary refill; no cyanosis; palpable 1+ pedal pulses b/l        LABS:                        8.5<L>  6.95  )-----------( 109<L>    ( 03 Aug 2024 01:30 )             26.2<L>                        9.4<L>  8.21  )-----------( 116<L>    ( 02 Aug 2024 16:15 )             28.5<L>    08-03    143  |  113<H>  |  14  ----------------------------<  114<H>  4.6   |  19  |  1.0  08-02    141  |  110  |  15  ----------------------------<  139<H>  4.2   |  21  |  1.0    Ca    7.6<L>      03 Aug 2024 01:30  Mg     2.4     08-03    TPro  5.6<L> [6.0 - 8.0]  /  Alb  4.3 [3.5 - 5.2]  /  TBili  0.5 [0.2 - 1.2]  /  DBili  x   /  AST  85<H> [0 - 41]  /  ALT  17 [0 - 41]  /  AlkPhos  39 [30 - 115]  08-03    PT/INR - ( 03 Aug 2024 01:30 )   PT: ;   INR: 1.22 ratio       PT/INR - ( 02 Aug 2024 16:15 )   PT: ;   INR: 1.16 ratio       PTT - ( 03 Aug 2024 01:30 )  PTT:30.3 sec, PTT - ( 02 Aug 2024 16:15 )  PTT:28.6 sec    ABG - ( 03 Aug 2024 03:59 )  pH: 7.39  /  pCO2: 34    /  pO2: 125   / HCO3: 21    / Base Excess: -3.9  /  SaO2: 98.5  /  LA: 0.6        Allergies  No Known Allergies  Intolerances      MEDICATIONS  (STANDING):  acetaminophen     Tablet .. 650 milliGRAM(s) Oral every 6 hours  acetaminophen   IVPB .. 1000 milliGRAM(s) IV Intermittent once  acetaminophen   IVPB .. 1000 milliGRAM(s) IV Intermittent once  acetaminophen   IVPB .. 1000 milliGRAM(s) IV Intermittent once  acetaminophen   IVPB .. 1000 milliGRAM(s) IV Intermittent once  aMIOdarone Infusion 0.5 mG/Min (16.7 mL/Hr) IV Continuous <Continuous>  aspirin Suppository 300 milliGRAM(s) Rectal once  ceFAZolin   IVPB 2000 milliGRAM(s) IV Intermittent every 8 hours  cefepime   IVPB 2000 milliGRAM(s) IV Intermittent every 12 hours  chlorhexidine 0.12% Liquid 15 milliLiter(s) Oral Mucosa every 12 hours  dexMEDEtomidine Infusion 1 MICROgram(s)/kG/Hr (20.4 mL/Hr) IV Continuous <Continuous>  dextrose 50% Injectable 50 milliLiter(s) IV Push every 15 minutes  dextrose 50% Injectable 25 milliLiter(s) IV Push every 15 minutes  famotidine Injectable 20 milliGRAM(s) IV Push every 12 hours  insulin regular Infusion 5 Unit(s)/Hr (5 mL/Hr) IV Continuous <Continuous>  meperidine     Injectable 25 milliGRAM(s) IV Push once  niCARdipine Infusion 5 mG/Hr (25 mL/Hr) IV Continuous <Continuous>  nitroglycerin  Infusion 15 MICROgram(s)/Min (4.5 mL/Hr) IV Continuous <Continuous>  norepinephrine Infusion 0.05 MICROgram(s)/kG/Min (7.64 mL/Hr) IV Continuous <Continuous>  polyethylene glycol 3350 17 Gram(s) Oral daily  senna 2 Tablet(s) Oral at bedtime  sodium chloride 0.9%. 1000 milliLiter(s) (20 mL/Hr) IV Continuous <Continuous>  vancomycin  IVPB 1000 milliGRAM(s) IV Intermittent every 12 hours  vasopressin Infusion 0.04 Unit(s)/Min (6 mL/Hr) IV Continuous <Continuous>    MEDICATIONS  (PRN):  HYDROmorphone  Injectable 0.5 milliGRAM(s) IV Push every 6 hours PRN Breakthrough Pain  oxyCODONE    IR 10 milliGRAM(s) Oral every 4 hours PRN Severe Pain (7 - 10)  oxyCODONE    IR 5 milliGRAM(s) Oral every 4 hours PRN Moderate Pain (4 - 6)    Home Medications:  Flomax 0.4 mg oral capsule: 1 cap(s) orally once a day (31 Jul 2024 17:17)  levothyroxine 88 mcg (0.088 mg) oral tablet: 1 tab(s) orally once a day (31 Jul 2024 17:17)  Metoprolol Succinate ER 25 mg oral tablet, extended release: 1 tab(s) orally once a day (31 Jul 2024 17:17)  Ranexa 500 mg oral tablet, extended release: 1 tab(s) orally 2 times a day (31 Jul 2024 17:17)  rosuvastatin 20 mg oral tablet: 1 tab(s) orally once a day (at bedtime) (31 Jul 2024 17:17)      Assessment/Plan:  69y Male status-post CABGX4 POD#1  - Case and plan discussed with CTU Intensivist and CT Surgeon - Dr. Earl  - Continue CTU supportive care and ongoing plan of care as per continuing CTU rounds.   - Continue DVT/GI prophylaxis  - Incentive Spirometry 10 times an hour  - Continue to advance physical activity as tolerated and continue PT/OT as directed  1. CAD s/p CABG: Continue ASA, statin, BB  2. HTN:   3. Post-op A. Fib ppx:   4. COPD/Hypoxia:   5. DM/Glucose Control:     Social Service Disposition:     OPERATIVE PROCEDURE(s):  CABGx4              POD # 1                      SURGEON(s): NATI Earl    HPI: Patient is a 69 year-old current life long smoker with a past medical history of HTN, HLD. hypothyroidism, BPH, CKD (baseline Cr 1.3-1.4) presented today for an elective cardiac catheterization. Patient states for several months he felt worsening shortness of breath when walking uphill and 1-2 flights of stairs, relieved with rest. Patient also complains of occasional dizziness with postural changes. Patient saw a cardiologist who recommended a CCTA. Bullous emphysema on CT scan. On 7/12/24, he underwent a CCTA, revealing a score of 722. Patient underwent a cardiac catheterization via right radial artery, revealing 3vCAD. On 08/02/24, he underwent surgical myocardial revascularization.    SUBJECTIVE ASSESSMENT:69yMale patient seen and examined at bedside.    Vital Signs Last 24 Hrs  T(F): 99.7 (03 Aug 2024 04:00), Max: 99.7 (03 Aug 2024 04:00)  HR: 74 (03 Aug 2024 06:00) (72 - 98)  BP: 110/66 (03 Aug 2024 06:00) (94/58 - 133/84)  BP(mean): 83 (03 Aug 2024 06:00) (71 - 102)  ABP: 107/56 (03 Aug 2024 06:00) (44/44 - 164/71)  ABP(mean): 73 (03 Aug 2024 06:00)  RR: 19 (03 Aug 2024 06:00) (11 - 32)  SpO2: 100% (03 Aug 2024 06:00) (95% - 100%)  CVP(mm Hg): 3 (03 Aug 2024 06:00)  CVP(cm H2O): --  CO: 7.3 (03 Aug 2024 03:22)  CI: 3.7 (03 Aug 2024 03:22)  PA: 28/7 (03 Aug 2024 06:00)  SVR: 820 (03 Aug 2024 03:22)  Mode: standby  FiO2: 40    I&O's Detail    01 Aug 2024 07:01  -  02 Aug 2024 07:00  --------------------------------------------------------  IN:    Oral Fluid: 840 mL  Total IN: 840 mL    OUT:    Voided (mL): 1800 mL  Total OUT: 1800 mL    Net: I&O's Detail    31 Jul 2024 07:01  -  01 Aug 2024 07:00  --------------------------------------------------------  Total NET: -400 mL      01 Aug 2024 07:01  -  02 Aug 2024 07:00  --------------------------------------------------------  Total NET: -960 mL      CAPILLARY BLOOD GLUCOSE  POCT Blood Glucose.: 124 mg/dL (03 Aug 2024 01:01)  POCT Blood Glucose.: 106 mg/dL (02 Aug 2024 23:07)  POCT Blood Glucose.: 86 mg/dL (02 Aug 2024 21:28)  POCT Blood Glucose.: 127 mg/dL (02 Aug 2024 20:08)  POCT Blood Glucose.: 166 mg/dL (02 Aug 2024 18:54)  POCT Blood Glucose.: 170 mg/dL (02 Aug 2024 18:19)  POCT Blood Glucose.: 174 mg/dL (02 Aug 2024 17:33)  POCT Blood Glucose.: 145 mg/dL (02 Aug 2024 16:30)    A1C with Estimated Average Glucose Result: 5.8 % (07-31-24 @ 16:28)      Physical Exam:  General: NAD; A&Ox3  Cardiac: S1/S2, RRR, no murmur, no rubs  Lungs: unlabored respirations, CTA b/l, no wheeze, no rales, no crackles  Abdomen: Soft/NT/ND; positive bowel sounds x 4  Sternum: Intact, no click, incision healing well with no drainage  Incisions: Incisions clean/dry/intact  Extremities: No edema b/l lower extremities; good capillary refill; no cyanosis; palpable 1+ pedal pulses b/l        LABS:                        8.5<L>  6.95  )-----------( 109<L>    ( 03 Aug 2024 01:30 )             26.2<L>                        9.4<L>  8.21  )-----------( 116<L>    ( 02 Aug 2024 16:15 )             28.5<L>    08-03    143  |  113<H>  |  14  ----------------------------<  114<H>  4.6   |  19  |  1.0  08-02    141  |  110  |  15  ----------------------------<  139<H>  4.2   |  21  |  1.0    Ca    7.6<L>      03 Aug 2024 01:30  Mg     2.4     08-03    TPro  5.6<L> [6.0 - 8.0]  /  Alb  4.3 [3.5 - 5.2]  /  TBili  0.5 [0.2 - 1.2]  /  DBili  x   /  AST  85<H> [0 - 41]  /  ALT  17 [0 - 41]  /  AlkPhos  39 [30 - 115]  08-03    PT/INR - ( 03 Aug 2024 01:30 )   PT: ;   INR: 1.22 ratio       PT/INR - ( 02 Aug 2024 16:15 )   PT: ;   INR: 1.16 ratio       PTT - ( 03 Aug 2024 01:30 )  PTT:30.3 sec, PTT - ( 02 Aug 2024 16:15 )  PTT:28.6 sec    ABG - ( 03 Aug 2024 03:59 )  pH: 7.39  /  pCO2: 34    /  pO2: 125   / HCO3: 21    / Base Excess: -3.9  /  SaO2: 98.5  /  LA: 0.6      < from: TTE Echo Complete w/ Contrast w/ Doppler (08.01.24 @ 10:17) >  Summary:   1. Normal global left ventricular systolic function.   2. LV Ejection Fraction by Kim's Method with a biplane EF of 58 %.   3. There is normal left ventricular diastolic function, with normal   filling pressure.   4. No evidence of LV thrombus on contrast images.   5. Normal right ventricular size and function.   6. Sclerotic aortic valve with normal opening.   7. No hemodynamically significant valvular regurgitation or stenosis.   8. Adequate TR velocity was not obtained to accurately assess RVSP.   9. Endocardial visualization was enhanced with intravenous echo contrast.    PHYSICIAN INTERPRETATION:  Left Ventricle: Endocardial visualization was enhanced with intravenous   echo contrast. The left ventricular internal cavity size is normal.   Global LV systolic function was normal. Spectral Doppler shows normal   pattern of LV diastolic filling. Normal LV filling pressures. No evidence   of LV thrombus on contrast images.  RightVentricle: Normal right ventricular size and function.  Left Atrium: Normal left atrial size.  Right Atrium: Normal right atrial size.  Pericardium: There is no evidence of pericardial effusion.  Mitral Valve: Structurally normal mitral valve, with normal leaflet   excursion. No evidence of mitral stenosis. Trace mitral valve   regurgitation is seen.  Tricuspid Valve: Structurally normal tricuspid valve, with normal leaflet   excursion. Trivial tricuspid regurgitation is visualized. Adequate TR   velocity was not obtained to accurately assess RVSP.  Aortic Valve: No evidence of aortic stenosis. Sclerotic aortic valve with   normal opening. No evidence of aortic valve regurgitation is seen.  Pulmonic Valve: The pulmonic valve was not well visualized.  Aorta: The aortic root and ascending aorta are structurally normal, with   no evidence of dilitation.  Venous: The inferior vena cava is not well visualized.  In comparison to the previous echocardiogram(s): There are no prior   studies on this patient for comparison purposes.    < end of copied text >    Allergies  No Known Allergies  Intolerances      MEDICATIONS  (STANDING):  acetaminophen     Tablet .. 650 milliGRAM(s) Oral every 6 hours  acetaminophen   IVPB .. 1000 milliGRAM(s) IV Intermittent once  acetaminophen   IVPB .. 1000 milliGRAM(s) IV Intermittent once  acetaminophen   IVPB .. 1000 milliGRAM(s) IV Intermittent once  acetaminophen   IVPB .. 1000 milliGRAM(s) IV Intermittent once  aMIOdarone Infusion 0.5 mG/Min (16.7 mL/Hr) IV Continuous <Continuous>  aspirin Suppository 300 milliGRAM(s) Rectal once  ceFAZolin   IVPB 2000 milliGRAM(s) IV Intermittent every 8 hours  cefepime   IVPB 2000 milliGRAM(s) IV Intermittent every 12 hours  chlorhexidine 0.12% Liquid 15 milliLiter(s) Oral Mucosa every 12 hours  dexMEDEtomidine Infusion 1 MICROgram(s)/kG/Hr (20.4 mL/Hr) IV Continuous <Continuous>  dextrose 50% Injectable 50 milliLiter(s) IV Push every 15 minutes  dextrose 50% Injectable 25 milliLiter(s) IV Push every 15 minutes  famotidine Injectable 20 milliGRAM(s) IV Push every 12 hours  insulin regular Infusion 5 Unit(s)/Hr (5 mL/Hr) IV Continuous <Continuous>  meperidine     Injectable 25 milliGRAM(s) IV Push once  niCARdipine Infusion 5 mG/Hr (25 mL/Hr) IV Continuous <Continuous>  nitroglycerin  Infusion 15 MICROgram(s)/Min (4.5 mL/Hr) IV Continuous <Continuous>  norepinephrine Infusion 0.05 MICROgram(s)/kG/Min (7.64 mL/Hr) IV Continuous <Continuous>  polyethylene glycol 3350 17 Gram(s) Oral daily  senna 2 Tablet(s) Oral at bedtime  sodium chloride 0.9%. 1000 milliLiter(s) (20 mL/Hr) IV Continuous <Continuous>  vancomycin  IVPB 1000 milliGRAM(s) IV Intermittent every 12 hours  vasopressin Infusion 0.04 Unit(s)/Min (6 mL/Hr) IV Continuous <Continuous>    MEDICATIONS  (PRN):  HYDROmorphone  Injectable 0.5 milliGRAM(s) IV Push every 6 hours PRN Breakthrough Pain  oxyCODONE    IR 10 milliGRAM(s) Oral every 4 hours PRN Severe Pain (7 - 10)  oxyCODONE    IR 5 milliGRAM(s) Oral every 4 hours PRN Moderate Pain (4 - 6)    Home Medications:  Flomax 0.4 mg oral capsule: 1 cap(s) orally once a day (31 Jul 2024 17:17)  levothyroxine 88 mcg (0.088 mg) oral tablet: 1 tab(s) orally once a day (31 Jul 2024 17:17)  Metoprolol Succinate ER 25 mg oral tablet, extended release: 1 tab(s) orally once a day (31 Jul 2024 17:17)  Ranexa 500 mg oral tablet, extended release: 1 tab(s) orally 2 times a day (31 Jul 2024 17:17)  rosuvastatin 20 mg oral tablet: 1 tab(s) orally once a day (at bedtime) (31 Jul 2024 17:17)      Assessment/Plan:  69y Male status-post CABGX4 POD#1  - Case and plan discussed with CTU Intensivist and CT Surgeon - Dr. Earl  - Continue CTU supportive care and ongoing plan of care as per continuing CTU rounds.   - Continue DVT/GI prophylaxis  - Incentive Spirometry 10 times an hour  - Continue to advance physical activity as tolerated and continue PT/OT as directed  1. CAD s/p CABG: Continue ASA, statin, BB  2. BPH: resume home dose flomax.        OPERATIVE PROCEDURE(s):  CABGx4              POD # 1                      SURGEON(s): NATI Earl    HPI: Patient is a 69 year-old current life long smoker with a past medical history of HTN, HLD. hypothyroidism, BPH, CKD (baseline Cr 1.3-1.4) presented today for an elective cardiac catheterization. Patient states for several months he felt worsening shortness of breath when walking uphill and 1-2 flights of stairs, relieved with rest. Patient also complains of occasional dizziness with postural changes. Patient saw a cardiologist who recommended a CCTA. Bullous emphysema on CT scan. On 7/12/24, he underwent a CCTA, revealing a score of 722. Patient underwent a cardiac catheterization via right radial artery, revealing 3vCAD. On 08/02/24, he underwent surgical myocardial revascularization.    SUBJECTIVE ASSESSMENT:69yMale patient seen and examined at bedside.    Vital Signs Last 24 Hrs  T(F): 99.7 (03 Aug 2024 04:00), Max: 99.7 (03 Aug 2024 04:00)  HR: 74 (03 Aug 2024 06:00) (72 - 98)  BP: 110/66 (03 Aug 2024 06:00) (94/58 - 133/84)  BP(mean): 83 (03 Aug 2024 06:00) (71 - 102)  ABP: 107/56 (03 Aug 2024 06:00) (44/44 - 164/71)  ABP(mean): 73 (03 Aug 2024 06:00)  RR: 19 (03 Aug 2024 06:00) (11 - 32)  SpO2: 100% (03 Aug 2024 06:00) (95% - 100%)  CVP(mm Hg): 3 (03 Aug 2024 06:00)  CVP(cm H2O): --  CO: 7.3 (03 Aug 2024 03:22)  CI: 3.7 (03 Aug 2024 03:22)  PA: 28/7 (03 Aug 2024 06:00)  SVR: 820 (03 Aug 2024 03:22)  Mode: standby  FiO2: 40    I&O's Detail    01 Aug 2024 07:01  -  02 Aug 2024 07:00  --------------------------------------------------------  IN:    Oral Fluid: 840 mL  Total IN: 840 mL    OUT:    Voided (mL): 1800 mL  Total OUT: 1800 mL    Net: I&O's Detail    31 Jul 2024 07:01  -  01 Aug 2024 07:00  --------------------------------------------------------  Total NET: -400 mL      01 Aug 2024 07:01  -  02 Aug 2024 07:00  --------------------------------------------------------  Total NET: -960 mL      CAPILLARY BLOOD GLUCOSE  POCT Blood Glucose.: 124 mg/dL (03 Aug 2024 01:01)  POCT Blood Glucose.: 106 mg/dL (02 Aug 2024 23:07)  POCT Blood Glucose.: 86 mg/dL (02 Aug 2024 21:28)  POCT Blood Glucose.: 127 mg/dL (02 Aug 2024 20:08)  POCT Blood Glucose.: 166 mg/dL (02 Aug 2024 18:54)  POCT Blood Glucose.: 170 mg/dL (02 Aug 2024 18:19)  POCT Blood Glucose.: 174 mg/dL (02 Aug 2024 17:33)  POCT Blood Glucose.: 145 mg/dL (02 Aug 2024 16:30)    A1C with Estimated Average Glucose Result: 5.8 % (07-31-24 @ 16:28)      Physical Exam:  General: NAD; A&Ox3  Cardiac: S1/S2, RRR, no murmur, no rubs  Lungs: unlabored respirations, CTA b/l, no wheeze, no rales, no crackles  Abdomen: Soft/NT/ND; positive bowel sounds x 4  Sternum: Intact, no click, incision healing well with no drainage  Incisions: Incisions clean/dry/intact  Extremities: No edema b/l lower extremities; good capillary refill; no cyanosis; palpable 1+ pedal pulses b/l        LABS:                        8.5<L>  6.95  )-----------( 109<L>    ( 03 Aug 2024 01:30 )             26.2<L>                        9.4<L>  8.21  )-----------( 116<L>    ( 02 Aug 2024 16:15 )             28.5<L>    08-03    143  |  113<H>  |  14  ----------------------------<  114<H>  4.6   |  19  |  1.0  08-02    141  |  110  |  15  ----------------------------<  139<H>  4.2   |  21  |  1.0    Ca    7.6<L>      03 Aug 2024 01:30  Mg     2.4     08-03    TPro  5.6<L> [6.0 - 8.0]  /  Alb  4.3 [3.5 - 5.2]  /  TBili  0.5 [0.2 - 1.2]  /  DBili  x   /  AST  85<H> [0 - 41]  /  ALT  17 [0 - 41]  /  AlkPhos  39 [30 - 115]  08-03    PT/INR - ( 03 Aug 2024 01:30 )   PT: ;   INR: 1.22 ratio       PT/INR - ( 02 Aug 2024 16:15 )   PT: ;   INR: 1.16 ratio       PTT - ( 03 Aug 2024 01:30 )  PTT:30.3 sec, PTT - ( 02 Aug 2024 16:15 )  PTT:28.6 sec    ABG - ( 03 Aug 2024 03:59 )  pH: 7.39  /  pCO2: 34    /  pO2: 125   / HCO3: 21    / Base Excess: -3.9  /  SaO2: 98.5  /  LA: 0.6      < from: TTE Echo Complete w/ Contrast w/ Doppler (08.01.24 @ 10:17) >  Summary:   1. Normal global left ventricular systolic function.   2. LV Ejection Fraction by Kim's Method with a biplane EF of 58 %.   3. There is normal left ventricular diastolic function, with normal   filling pressure.   4. No evidence of LV thrombus on contrast images.   5. Normal right ventricular size and function.   6. Sclerotic aortic valve with normal opening.   7. No hemodynamically significant valvular regurgitation or stenosis.   8. Adequate TR velocity was not obtained to accurately assess RVSP.   9. Endocardial visualization was enhanced with intravenous echo contrast.    PHYSICIAN INTERPRETATION:  Left Ventricle: Endocardial visualization was enhanced with intravenous   echo contrast. The left ventricular internal cavity size is normal.   Global LV systolic function was normal. Spectral Doppler shows normal   pattern of LV diastolic filling. Normal LV filling pressures. No evidence   of LV thrombus on contrast images.  RightVentricle: Normal right ventricular size and function.  Left Atrium: Normal left atrial size.  Right Atrium: Normal right atrial size.  Pericardium: There is no evidence of pericardial effusion.  Mitral Valve: Structurally normal mitral valve, with normal leaflet   excursion. No evidence of mitral stenosis. Trace mitral valve   regurgitation is seen.  Tricuspid Valve: Structurally normal tricuspid valve, with normal leaflet   excursion. Trivial tricuspid regurgitation is visualized. Adequate TR   velocity was not obtained to accurately assess RVSP.  Aortic Valve: No evidence of aortic stenosis. Sclerotic aortic valve with   normal opening. No evidence of aortic valve regurgitation is seen.  Pulmonic Valve: The pulmonic valve was not well visualized.  Aorta: The aortic root and ascending aorta are structurally normal, with   no evidence of dilitation.  Venous: The inferior vena cava is not well visualized.  In comparison to the previous echocardiogram(s): There are no prior   studies on this patient for comparison purposes.    < end of copied text >    Allergies  No Known Allergies  Intolerances      MEDICATIONS  (STANDING):  acetaminophen     Tablet .. 650 milliGRAM(s) Oral every 6 hours  acetaminophen   IVPB .. 1000 milliGRAM(s) IV Intermittent once  acetaminophen   IVPB .. 1000 milliGRAM(s) IV Intermittent once  acetaminophen   IVPB .. 1000 milliGRAM(s) IV Intermittent once  acetaminophen   IVPB .. 1000 milliGRAM(s) IV Intermittent once  aMIOdarone Infusion 0.5 mG/Min (16.7 mL/Hr) IV Continuous <Continuous>  aspirin Suppository 300 milliGRAM(s) Rectal once  ceFAZolin   IVPB 2000 milliGRAM(s) IV Intermittent every 8 hours  cefepime   IVPB 2000 milliGRAM(s) IV Intermittent every 12 hours  chlorhexidine 0.12% Liquid 15 milliLiter(s) Oral Mucosa every 12 hours  dexMEDEtomidine Infusion 1 MICROgram(s)/kG/Hr (20.4 mL/Hr) IV Continuous <Continuous>  dextrose 50% Injectable 50 milliLiter(s) IV Push every 15 minutes  dextrose 50% Injectable 25 milliLiter(s) IV Push every 15 minutes  famotidine Injectable 20 milliGRAM(s) IV Push every 12 hours  insulin regular Infusion 5 Unit(s)/Hr (5 mL/Hr) IV Continuous <Continuous>  meperidine     Injectable 25 milliGRAM(s) IV Push once  niCARdipine Infusion 5 mG/Hr (25 mL/Hr) IV Continuous <Continuous>  nitroglycerin  Infusion 15 MICROgram(s)/Min (4.5 mL/Hr) IV Continuous <Continuous>  norepinephrine Infusion 0.05 MICROgram(s)/kG/Min (7.64 mL/Hr) IV Continuous <Continuous>  polyethylene glycol 3350 17 Gram(s) Oral daily  senna 2 Tablet(s) Oral at bedtime  sodium chloride 0.9%. 1000 milliLiter(s) (20 mL/Hr) IV Continuous <Continuous>  vancomycin  IVPB 1000 milliGRAM(s) IV Intermittent every 12 hours  vasopressin Infusion 0.04 Unit(s)/Min (6 mL/Hr) IV Continuous <Continuous>    MEDICATIONS  (PRN):  HYDROmorphone  Injectable 0.5 milliGRAM(s) IV Push every 6 hours PRN Breakthrough Pain  oxyCODONE    IR 10 milliGRAM(s) Oral every 4 hours PRN Severe Pain (7 - 10)  oxyCODONE    IR 5 milliGRAM(s) Oral every 4 hours PRN Moderate Pain (4 - 6)    Home Medications:  Flomax 0.4 mg oral capsule: 1 cap(s) orally once a day (31 Jul 2024 17:17)  levothyroxine 88 mcg (0.088 mg) oral tablet: 1 tab(s) orally once a day (31 Jul 2024 17:17)  Metoprolol Succinate ER 25 mg oral tablet, extended release: 1 tab(s) orally once a day (31 Jul 2024 17:17)  Ranexa 500 mg oral tablet, extended release: 1 tab(s) orally 2 times a day (31 Jul 2024 17:17)  rosuvastatin 20 mg oral tablet: 1 tab(s) orally once a day (at bedtime) (31 Jul 2024 17:17)      Assessment/Plan:  69y Male status-post CABGX4 POD#1  - Case and plan discussed with CTU Intensivist and CT Surgeon - Dr. Earl  - Continue CTU supportive care and ongoing plan of care as per continuing CTU rounds.   - Continue DVT/GI prophylaxis  - Incentive Spirometry 10 times an hour  - Continue to advance physical activity as tolerated and continue PT/OT as directed  1. CAD s/p CABG: Continue ASA, statin, BB  2. BPH: resume home dose flomax.  3. Hypothyroidism: resume home dose synthroid.

## 2024-08-04 LAB
ALBUMIN SERPL ELPH-MCNC: 4 G/DL — SIGNIFICANT CHANGE UP (ref 3.5–5.2)
ALBUMIN SERPL ELPH-MCNC: 4.1 G/DL — SIGNIFICANT CHANGE UP (ref 3.5–5.2)
ALP SERPL-CCNC: 49 U/L — SIGNIFICANT CHANGE UP (ref 30–115)
ALP SERPL-CCNC: 52 U/L — SIGNIFICANT CHANGE UP (ref 30–115)
ALT FLD-CCNC: 15 U/L — SIGNIFICANT CHANGE UP (ref 0–41)
ALT FLD-CCNC: 16 U/L — SIGNIFICANT CHANGE UP (ref 0–41)
ANION GAP SERPL CALC-SCNC: 10 MMOL/L — SIGNIFICANT CHANGE UP (ref 7–14)
ANION GAP SERPL CALC-SCNC: 11 MMOL/L — SIGNIFICANT CHANGE UP (ref 7–14)
APTT BLD: 32.1 SEC — SIGNIFICANT CHANGE UP (ref 27–39.2)
AST SERPL-CCNC: 45 U/L — HIGH (ref 0–41)
AST SERPL-CCNC: 62 U/L — HIGH (ref 0–41)
BASOPHILS # BLD AUTO: 0.05 K/UL — SIGNIFICANT CHANGE UP (ref 0–0.2)
BASOPHILS NFR BLD AUTO: 0.6 % — SIGNIFICANT CHANGE UP (ref 0–1)
BILIRUB SERPL-MCNC: 0.5 MG/DL — SIGNIFICANT CHANGE UP (ref 0.2–1.2)
BILIRUB SERPL-MCNC: 0.7 MG/DL — SIGNIFICANT CHANGE UP (ref 0.2–1.2)
BUN SERPL-MCNC: 11 MG/DL — SIGNIFICANT CHANGE UP (ref 10–20)
BUN SERPL-MCNC: 8 MG/DL — LOW (ref 10–20)
CALCIUM SERPL-MCNC: 7.9 MG/DL — LOW (ref 8.4–10.5)
CALCIUM SERPL-MCNC: 8.6 MG/DL — SIGNIFICANT CHANGE UP (ref 8.4–10.4)
CHLORIDE SERPL-SCNC: 104 MMOL/L — SIGNIFICANT CHANGE UP (ref 98–110)
CHLORIDE SERPL-SCNC: 104 MMOL/L — SIGNIFICANT CHANGE UP (ref 98–110)
CO2 SERPL-SCNC: 21 MMOL/L — SIGNIFICANT CHANGE UP (ref 17–32)
CO2 SERPL-SCNC: 23 MMOL/L — SIGNIFICANT CHANGE UP (ref 17–32)
CREAT SERPL-MCNC: 0.9 MG/DL — SIGNIFICANT CHANGE UP (ref 0.7–1.5)
CREAT SERPL-MCNC: 1 MG/DL — SIGNIFICANT CHANGE UP (ref 0.7–1.5)
EGFR: 82 ML/MIN/1.73M2 — SIGNIFICANT CHANGE UP
EGFR: 94 ML/MIN/1.73M2 — SIGNIFICANT CHANGE UP
EOSINOPHIL # BLD AUTO: 0.05 K/UL — SIGNIFICANT CHANGE UP (ref 0–0.7)
EOSINOPHIL NFR BLD AUTO: 0.6 % — SIGNIFICANT CHANGE UP (ref 0–8)
GLUCOSE BLDC GLUCOMTR-MCNC: 114 MG/DL — HIGH (ref 70–99)
GLUCOSE BLDC GLUCOMTR-MCNC: 128 MG/DL — HIGH (ref 70–99)
GLUCOSE BLDC GLUCOMTR-MCNC: 145 MG/DL — HIGH (ref 70–99)
GLUCOSE SERPL-MCNC: 106 MG/DL — HIGH (ref 70–99)
GLUCOSE SERPL-MCNC: 126 MG/DL — HIGH (ref 70–99)
HCT VFR BLD CALC: 27 % — LOW (ref 42–52)
HGB BLD-MCNC: 9.1 G/DL — LOW (ref 14–18)
IMM GRANULOCYTES NFR BLD AUTO: 0.3 % — SIGNIFICANT CHANGE UP (ref 0.1–0.3)
INR BLD: 1.37 RATIO — HIGH (ref 0.65–1.3)
LYMPHOCYTES # BLD AUTO: 0.99 K/UL — LOW (ref 1.2–3.4)
LYMPHOCYTES # BLD AUTO: 11.1 % — LOW (ref 20.5–51.1)
MAGNESIUM SERPL-MCNC: 2.2 MG/DL — SIGNIFICANT CHANGE UP (ref 1.8–2.4)
MAGNESIUM SERPL-MCNC: 2.2 MG/DL — SIGNIFICANT CHANGE UP (ref 1.8–2.4)
MCHC RBC-ENTMCNC: 30.8 PG — SIGNIFICANT CHANGE UP (ref 27–31)
MCHC RBC-ENTMCNC: 33.7 G/DL — SIGNIFICANT CHANGE UP (ref 32–37)
MCV RBC AUTO: 91.5 FL — SIGNIFICANT CHANGE UP (ref 80–94)
MONOCYTES # BLD AUTO: 0.65 K/UL — HIGH (ref 0.1–0.6)
MONOCYTES NFR BLD AUTO: 7.3 % — SIGNIFICANT CHANGE UP (ref 1.7–9.3)
NEUTROPHILS # BLD AUTO: 7.17 K/UL — HIGH (ref 1.4–6.5)
NEUTROPHILS NFR BLD AUTO: 80.1 % — HIGH (ref 42.2–75.2)
NRBC # BLD: 0 /100 WBCS — SIGNIFICANT CHANGE UP (ref 0–0)
PLATELET # BLD AUTO: 119 K/UL — LOW (ref 130–400)
PMV BLD: 10.8 FL — HIGH (ref 7.4–10.4)
POTASSIUM SERPL-MCNC: 3.8 MMOL/L — SIGNIFICANT CHANGE UP (ref 3.5–5)
POTASSIUM SERPL-MCNC: 4.5 MMOL/L — SIGNIFICANT CHANGE UP (ref 3.5–5)
POTASSIUM SERPL-SCNC: 3.8 MMOL/L — SIGNIFICANT CHANGE UP (ref 3.5–5)
POTASSIUM SERPL-SCNC: 4.5 MMOL/L — SIGNIFICANT CHANGE UP (ref 3.5–5)
PROT SERPL-MCNC: 5.7 G/DL — LOW (ref 6–8)
PROT SERPL-MCNC: 5.8 G/DL — LOW (ref 6–8)
PROTHROM AB SERPL-ACNC: 15.7 SEC — HIGH (ref 9.95–12.87)
RBC # BLD: 2.95 M/UL — LOW (ref 4.7–6.1)
RBC # FLD: 14.5 % — SIGNIFICANT CHANGE UP (ref 11.5–14.5)
SODIUM SERPL-SCNC: 136 MMOL/L — SIGNIFICANT CHANGE UP (ref 135–146)
SODIUM SERPL-SCNC: 137 MMOL/L — SIGNIFICANT CHANGE UP (ref 135–146)
WBC # BLD: 8.94 K/UL — SIGNIFICANT CHANGE UP (ref 4.8–10.8)
WBC # FLD AUTO: 8.94 K/UL — SIGNIFICANT CHANGE UP (ref 4.8–10.8)

## 2024-08-04 PROCEDURE — 71045 X-RAY EXAM CHEST 1 VIEW: CPT | Mod: 26

## 2024-08-04 PROCEDURE — 99233 SBSQ HOSP IP/OBS HIGH 50: CPT

## 2024-08-04 PROCEDURE — 71045 X-RAY EXAM CHEST 1 VIEW: CPT | Mod: 26,77

## 2024-08-04 PROCEDURE — 93010 ELECTROCARDIOGRAM REPORT: CPT

## 2024-08-04 RX ORDER — ACETAMINOPHEN 500 MG
1000 TABLET ORAL ONCE
Refills: 0 | Status: COMPLETED | OUTPATIENT
Start: 2024-08-05 | End: 2024-08-04

## 2024-08-04 RX ORDER — POTASSIUM CHLORIDE 1500 MG/1
40 TABLET, EXTENDED RELEASE ORAL EVERY 4 HOURS
Refills: 0 | Status: COMPLETED | OUTPATIENT
Start: 2024-08-04 | End: 2024-08-04

## 2024-08-04 RX ORDER — ACETAMINOPHEN 500 MG
1000 TABLET ORAL ONCE
Refills: 0 | Status: COMPLETED | OUTPATIENT
Start: 2024-08-05 | End: 2024-08-05

## 2024-08-04 RX ORDER — ACETAMINOPHEN 500 MG
1000 TABLET ORAL ONCE
Refills: 0 | Status: COMPLETED | OUTPATIENT
Start: 2024-08-04 | End: 2024-08-04

## 2024-08-04 RX ORDER — INSULIN LISPRO 100/ML
VIAL (ML) SUBCUTANEOUS
Refills: 0 | Status: DISCONTINUED | OUTPATIENT
Start: 2024-08-04 | End: 2024-08-06

## 2024-08-04 RX ORDER — POTASSIUM CHLORIDE 1500 MG/1
20 TABLET, EXTENDED RELEASE ORAL DAILY
Refills: 0 | Status: ACTIVE | OUTPATIENT
Start: 2024-08-05 | End: 2024-08-09

## 2024-08-04 RX ORDER — POTASSIUM CHLORIDE 1500 MG/1
20 TABLET, EXTENDED RELEASE ORAL DAILY
Refills: 0 | Status: DISCONTINUED | OUTPATIENT
Start: 2024-08-04 | End: 2024-08-04

## 2024-08-04 RX ORDER — FUROSEMIDE 10 MG/ML
20 INJECTION, SOLUTION INTRAVENOUS DAILY
Refills: 0 | Status: DISCONTINUED | OUTPATIENT
Start: 2024-08-04 | End: 2024-08-05

## 2024-08-04 RX ADMIN — Medication 12.5 MILLIGRAM(S): at 17:16

## 2024-08-04 RX ADMIN — HEPARIN SODIUM 3000 UNIT(S): 1000 INJECTION, SOLUTION INTRAVENOUS; SUBCUTANEOUS at 17:15

## 2024-08-04 RX ADMIN — POTASSIUM CHLORIDE 40 MILLIEQUIVALENT(S): 1500 TABLET, EXTENDED RELEASE ORAL at 11:00

## 2024-08-04 RX ADMIN — HEPARIN SODIUM 3000 UNIT(S): 1000 INJECTION, SOLUTION INTRAVENOUS; SUBCUTANEOUS at 05:38

## 2024-08-04 RX ADMIN — Medication 400 MILLIGRAM(S): at 11:05

## 2024-08-04 RX ADMIN — IPRATROPIUM BROMIDE AND ALBUTEROL SULFATE 3 MILLILITER(S): 2.5; .5 SOLUTION RESPIRATORY (INHALATION) at 08:04

## 2024-08-04 RX ADMIN — Medication 12.5 MILLIGRAM(S): at 05:37

## 2024-08-04 RX ADMIN — IPRATROPIUM BROMIDE AND ALBUTEROL SULFATE 3 MILLILITER(S): 2.5; .5 SOLUTION RESPIRATORY (INHALATION) at 13:26

## 2024-08-04 RX ADMIN — FUROSEMIDE 20 MILLIGRAM(S): 10 INJECTION, SOLUTION INTRAVENOUS at 08:36

## 2024-08-04 RX ADMIN — ATORVASTATIN CALCIUM 80 MILLIGRAM(S): 40 TABLET, FILM COATED ORAL at 21:25

## 2024-08-04 RX ADMIN — CEFEPIME HYDROCHLORIDE 100 MILLIGRAM(S): 1 INJECTION, POWDER, FOR SOLUTION INTRAMUSCULAR; INTRAVENOUS at 05:37

## 2024-08-04 RX ADMIN — Medication 325 MILLIGRAM(S): at 11:39

## 2024-08-04 RX ADMIN — OXYCODONE HYDROCHLORIDE 10 MILLIGRAM(S): 30 TABLET ORAL at 03:23

## 2024-08-04 RX ADMIN — Medication 5 MILLIGRAM(S): at 21:25

## 2024-08-04 RX ADMIN — Medication 17 GRAM(S): at 11:37

## 2024-08-04 RX ADMIN — Medication 400 MILLIGRAM(S): at 17:16

## 2024-08-04 RX ADMIN — Medication 1000 MILLIGRAM(S): at 06:31

## 2024-08-04 RX ADMIN — Medication 0.4 MILLIGRAM(S): at 21:26

## 2024-08-04 RX ADMIN — Medication 88 MICROGRAM(S): at 05:37

## 2024-08-04 RX ADMIN — OXYCODONE HYDROCHLORIDE 10 MILLIGRAM(S): 30 TABLET ORAL at 04:20

## 2024-08-04 RX ADMIN — POTASSIUM CHLORIDE 40 MILLIEQUIVALENT(S): 1500 TABLET, EXTENDED RELEASE ORAL at 05:48

## 2024-08-04 RX ADMIN — Medication 400 MILLIGRAM(S): at 05:37

## 2024-08-04 RX ADMIN — CEFEPIME HYDROCHLORIDE 100 MILLIGRAM(S): 1 INJECTION, POWDER, FOR SOLUTION INTRAMUSCULAR; INTRAVENOUS at 17:16

## 2024-08-04 RX ADMIN — SENNOSIDES 2 TABLET(S): 8.6 TABLET ORAL at 21:25

## 2024-08-04 RX ADMIN — FAMOTIDINE 20 MILLIGRAM(S): 40 TABLET, FILM COATED ORAL at 05:37

## 2024-08-04 RX ADMIN — Medication 400 MILLIGRAM(S): at 23:31

## 2024-08-04 NOTE — PHYSICAL THERAPY INITIAL EVALUATION ADULT - PERTINENT HX OF CURRENT PROBLEM, REHAB EVAL
69 year-old current life long smoker with a past medical history of HTN, HLD. hypothyroidism, BPH, CKD (baseline Cr 1.3-1.4) presented today for an elective cardiac catheterization. Patient states for several months he felt worsening shortness of breath when walking uphill and 1-2 flights of stairs, relieved with rest. Patient also complains of occasional dizziness with postural changes. Patient saw a cardiologist who recommended a CCTA. Bullous emphysema on CT scan. On 7/12/24, he underwent a CCTA, revealing a score of 722. Patient underwent a cardiac catheterization via right radial artery, revealing 3vCAD. On 08/02/24, he underwent surgical myocardial revascularization.

## 2024-08-04 NOTE — PROGRESS NOTE ADULT - SUBJECTIVE AND OBJECTIVE BOX
FLAVIA LA  MRN#: [id number]  Subjective:  Patient was seen and evalauted on AM rounds offerring no specific compliants at this time.    OBJECTIVE:  ICU Vital Signs Last 24 Hrs  T(C): 37.3 (04 Aug 2024 08:00), Max: 37.7 (03 Aug 2024 20:00)  T(F): 99.2 (04 Aug 2024 08:00), Max: 99.9 (03 Aug 2024 20:00)  HR: 71 (04 Aug 2024 08:00) (70 - 83)  BP: 119/69 (04 Aug 2024 08:00) (114/63 - 119/69)  BP(mean): 88 (04 Aug 2024 08:00) (84 - 88)  ABP: 114/59 (04 Aug 2024 08:00) (-21/-21 - 139/62)  ABP(mean): 77 (04 Aug 2024 08:00) (-21 - 87)  RR: 26 (04 Aug 2024 08:) (20 - 38)  SpO2: 99% (04 Aug 2024 08:) (93% - 100%)    O2 Parameters below as of 04 Aug 2024 08:00  Patient On (Oxygen Delivery Method): nasal cannula  O2 Flow (L/min): 2           @ : @ 07:00  --------------------------------------------------------  IN: 2973.4 mL / OUT: 3701 mL / NET: -727.6 mL     @ :  -   @ 10:47  --------------------------------------------------------  IN: 0 mL / OUT: 40 mL / NET: -40 mL      CAPILLARY BLOOD GLUCOSE      POCT Blood Glucose.: 114 mg/dL (04 Aug 2024 06:40)      PHYSICAL EXAM:Daily     Daily Weight in k.4 (04 Aug 2024 06:00)  General: WN/WD NAD    HEENT:     + NCAT  + EOMI  - Conjuctival edema   - Icterus   - Thrush   - ETT  - NGT/OGT    Neck:         + FROM    - JVD     - Nodes     - Masses    + Mid-line trachea   - Tracheostomy    Chest:         - Sternal click  - Sternal drainage  + Pacing wires  + Chest tubes  - SubQ emphysema    Lungs:          + CTA   - Rhonchi    - Rales    - Wheezing     - Decreased BS   - Dullness R L    Cardiac:       + S1 + S2    + RRR   - Irregular   - S3  - S4    - Murmurs   - Rub   - Hamman’s sign     Abdomen:    + BS     + Soft    + Non-tender     - Distended    - Organomegaly  - PEG    Extremities:   - Cyanosis U/L   - Clubbing  U/L  - LE/UE Edema   + Capillary refill    + Pulses     Neuro:        + Awake   +  Alert   - Confused   - Lethargic   - Sedated   - Generalized Weakness    Skin:        - Rashes    - Erythema   + Normal incisions   + IV sites intact  - Sacral decubitus    HOSPITAL MEDICATIONS:  MEDICATIONS  (STANDING):  acetaminophen   IVPB .. 1000 milliGRAM(s) IV Intermittent once  albuterol/ipratropium for Nebulization 3 milliLiter(s) Nebulizer every 6 hours  aspirin 325 milliGRAM(s) Oral daily  atorvastatin 80 milliGRAM(s) Oral at bedtime  bisacodyl Suppository 10 milliGRAM(s) Rectal once  cefepime   IVPB 2000 milliGRAM(s) IV Intermittent every 12 hours  dextrose 5%. 1000 milliLiter(s) (100 mL/Hr) IV Continuous <Continuous>  dextrose 5%. 1000 milliLiter(s) (50 mL/Hr) IV Continuous <Continuous>  dextrose 50% Injectable 50 milliLiter(s) IV Push every 15 minutes  dextrose 50% Injectable 25 milliLiter(s) IV Push every 15 minutes  famotidine    Tablet 20 milliGRAM(s) Oral two times a day  furosemide   Injectable 20 milliGRAM(s) IV Push daily  glucagon  Injectable 1 milliGRAM(s) IntraMuscular once  heparin   Injectable 3000 Unit(s) SubCutaneous every 12 hours  insulin lispro (ADMELOG) corrective regimen sliding scale   SubCutaneous three times a day before meals  levothyroxine 88 MICROGram(s) Oral daily  meperidine     Injectable 25 milliGRAM(s) IV Push once  metoprolol tartrate 12.5 milliGRAM(s) Oral every 12 hours  nitroglycerin  Infusion 15 MICROgram(s)/Min (4.5 mL/Hr) IV Continuous <Continuous>  polyethylene glycol 3350 17 Gram(s) Oral daily  potassium chloride    Tablet ER 40 milliEquivalent(s) Oral every 4 hours  senna 2 Tablet(s) Oral at bedtime  sodium chloride 0.9%. 1000 milliLiter(s) (20 mL/Hr) IV Continuous <Continuous>  tamsulosin 0.4 milliGRAM(s) Oral at bedtime    MEDICATIONS  (PRN):  dextrose Oral Gel 15 Gram(s) Oral once PRN Blood Glucose LESS THAN 70 milliGRAM(s)/deciliter  HYDROmorphone  Injectable 0.5 milliGRAM(s) IV Push every 6 hours PRN Breakthrough Pain  melatonin 5 milliGRAM(s) Oral at bedtime PRN Sleep  ondansetron Injectable 4 milliGRAM(s) IV Push every 6 hours PRN Nausea and/or Vomiting  oxyCODONE    IR 10 milliGRAM(s) Oral every 4 hours PRN Severe Pain (7 - 10)  oxyCODONE    IR 5 milliGRAM(s) Oral every 4 hours PRN Moderate Pain (4 - 6)      LABS:                        9.1    8.94  )-----------( 119      ( 04 Aug 2024 02:10 )             27.0    08-04    136  |  104  |  8<L>  ----------------------------<  106<H>  3.8   |  21  |  0.9    Ca    7.9<L>      04 Aug 2024 02:10  Mg     2.2     08-04    TPro  5.7<L>  /  Alb  4.0  /  TBili  0.7  /  DBili  x   /  AST  62<H>  /  ALT  16  /  AlkPhos  49  08-04    PT/INR - ( 04 Aug 2024 02:10 )   PT: 15.70 sec;   INR: 1.37 ratio         PTT - ( 04 Aug 2024 02:10 )  PTT:32.1 sec LIVER FUNCTIONS - ( 04 Aug 2024 02:10 )  Alb: 4.0 g/dL / Pro: 5.7 g/dL / ALK PHOS: 49 U/L / ALT: 16 U/L / AST: 62 U/L / GGT: x           Urinalysis Basic - ( 04 Aug 2024 02:10 )    Color: x / Appearance: x / SG: x / pH: x  Gluc: 106 mg/dL / Ketone: x  / Bili: x / Urobili: x   Blood: x / Protein: x / Nitrite: x   Leuk Esterase: x / RBC: x / WBC x   Sq Epi: x / Non Sq Epi: x / Bacteria: x        RADIOLOGY:  X Reviewed and interpreted by me: bibasilar opacities/effusions    CARDIOPULMONARY DYSFUNCTION  - Respiratory status required supplemental oxygen & the following of continuous pulse oximetry for support & to prevent decompensation  - Continued early mobilization as tolerated  - Addressed analgesic regimen to optimize function    PREVENTION-PROPHYLAXIS  - ASA continued for graft occlusion-thromboembolism prophylaxis  - Lipitor was also started for long term graft patency  - Heparin continued for VTE prophylaxis in addition to Venodyne boots  - Pepcid maintained for GI bleeding prophylaxis  - Lopressor continued for atrial fibrillation prophylaxis  - Metabolic stability & infection prophylaxis required review and adjustment of regular Insulin sliding scale and gylcemic regimen while following serial glucose levels to help achieve & maintain euglycemia  - Reviewed & addressed surgical site infection prophylaxis regimen

## 2024-08-04 NOTE — PHYSICAL THERAPY INITIAL EVALUATION ADULT - GENERAL OBSERVATIONS, REHAB EVAL
Pt is pre op for CABG, scheduled for tomorrow as per CTU am rounds. PT spoke with pt, educated pt on role of b/s PT for recovery and rehabilitation, anticipated sternal precautions, use of IS 10x per hour, and general rehab goals once appropriate. Pt verbalized understanding. Pt reports no concerns regarding baseline mobility, stating he has been ambulating independently without AD this admission so far. PT to initiate b/s PT IE after surgery, once medically cleared and appropriate.
CTU. 68 y/o M received ambulating with the nurse, left in b/s chair, nad, + tele, 2 CT, gabby. pt is A+Ox4, pleasant and cooperative. pt ambulated 200 ft x 2 with cardiac RW and CS/CG.

## 2024-08-04 NOTE — PROGRESS NOTE ADULT - SUBJECTIVE AND OBJECTIVE BOX
OPERATIVE PROCEDURE(s):  CABGx4              POD # 2                      SURGEON(s): NATI Earl    HPI: Patient is a 69 year-old current life long smoker with a past medical history of HTN, HLD. hypothyroidism, BPH, CKD (baseline Cr 1.3-1.4) presented today for an elective cardiac catheterization. Patient states for several months he felt worsening shortness of breath when walking uphill and 1-2 flights of stairs, relieved with rest. Patient also complains of occasional dizziness with postural changes. Patient saw a cardiologist who recommended a CCTA. Bullous emphysema on CT scan. On 7/12/24, he underwent a CCTA, revealing a score of 722. Patient underwent a cardiac catheterization via right radial artery, revealing 3vCAD. On 08/02/24, he underwent surgical myocardial revascularization.      SUBJECTIVE ASSESSMENT:69yMale patient seen and examined at bedside.    Vital Signs Last 24 Hrs  T(F): 99.6 (04 Aug 2024 04:00), Max: 99.9 (03 Aug 2024 20:00)  HR: 81 (04 Aug 2024 06:00) (70 - 83)  BP: 114/63 (03 Aug 2024 20:00) (114/63 - 114/63)  BP(mean): 84 (03 Aug 2024 20:00) (84 - 84)  ABP: 127/61 (04 Aug 2024 06:00) (-21/-21 - 139/62)  ABP(mean): 81 (04 Aug 2024 06:00)  RR: 29 (04 Aug 2024 06:00) (20 - 38)  SpO2: 95% (04 Aug 2024 06:00) (93% - 100%)    I&O's Detail    02 Aug 2024 07:01  -  03 Aug 2024 07:00  --------------------------------------------------------  IN:    Albumin 5%  - 500 mL: 825 mL    Amiodarone: 200.4 mL    Dexmedetomidine: 75 mL    Insulin: 24 mL    IV PiggyBack: 100 mL    IV PiggyBack: 350 mL    IV PiggyBack: 50 mL    IV PiggyBack: 100 mL    IV PiggyBack: 500 mL    NiCARdipine: 30 mL    Nitroglycerin: 215 mL    Oral Fluid: 100 mL    sodium chloride 0.9%: 340 mL  Total IN: 2909.4 mL    OUT:    Chest Tube (mL): 106 mL    Chest Tube (mL): 410 mL    Indwelling Catheter - Urethral (mL): 2275 mL    Nasogastric/Oral tube (mL): 0 mL  Total OUT: 2791 mL    Net: I&O's Detail    01 Aug 2024 07:01  -  02 Aug 2024 07:00  --------------------------------------------------------  Total NET: -960 mL      02 Aug 2024 07:01  -  03 Aug 2024 07:00  --------------------------------------------------------  Total NET: 118.4 mL      CAPILLARY BLOOD GLUCOSE  POCT Blood Glucose.: 114 mg/dL (04 Aug 2024 06:40)  POCT Blood Glucose.: 116 mg/dL (03 Aug 2024 22:31)  POCT Blood Glucose.: 119 mg/dL (03 Aug 2024 16:58)  POCT Blood Glucose.: 121 mg/dL (03 Aug 2024 12:27)  POCT Blood Glucose.: 126 mg/dL (03 Aug 2024 08:56)  POCT Blood Glucose.: 143 mg/dL (03 Aug 2024 06:59)    A1C with Estimated Average Glucose Result: 5.8 % (07-31-24 @ 16:28)      Physical Exam:  General: NAD; A&Ox3  Cardiac: S1/S2, RRR, no murmur, no rubs  Lungs: unlabored respirations, CTA b/l, no wheeze, no rales, no crackles  Abdomen: Soft/NT/ND; positive bowel sounds x 4  Sternum: Intact, no click, incision healing well with no drainage  Incisions: Incisions clean/dry/intact  Extremities: No edema b/l lower extremities; good capillary refill; no cyanosis; palpable 1+ pedal pulses b/l       LABS:                        9.1<L>  8.94  )-----------( 119<L>    ( 04 Aug 2024 02:10 )             27.0<L>                        8.5<L>  6.95  )-----------( 109<L>    ( 03 Aug 2024 01:30 )             26.2<L>    08-04    136  |  104  |  8<L>  ----------------------------<  106<H>  3.8   |  21  |  0.9  08-03    135  |  104  |  9<L>  ----------------------------<  127<H>  4.0   |  21  |  0.9    Ca    7.9<L>      04 Aug 2024 02:10  Mg     2.2     08-04    TPro  5.7<L> [6.0 - 8.0]  /  Alb  4.0 [3.5 - 5.2]  /  TBili  0.7 [0.2 - 1.2]  /  DBili  x   /  AST  62<H> [0 - 41]  /  ALT  16 [0 - 41]  /  AlkPhos  49 [30 - 115]  08-04    PT/INR - ( 04 Aug 2024 02:10 )   PT: ;   INR: 1.37 ratio       PT/INR - ( 03 Aug 2024 01:30 )   PT: ;   INR: 1.22 ratio       PTT - ( 04 Aug 2024 02:10 )  PTT:32.1 sec, PTT - ( 03 Aug 2024 01:30 )  PTT:30.3 sec    ABG - ( 03 Aug 2024 03:59 )  pH: 7.39  /  pCO2: 34    /  pO2: 125   / HCO3: 21    / Base Excess: -3.9  /  SaO2: 98.5  /  LA: 0.6      < from: TTE Echo Complete w/ Contrast w/ Doppler (08.01.24 @ 10:17) >  Summary:   1. Normal global left ventricular systolic function.   2. LV Ejection Fraction by Kim's Method with a biplane EF of 58 %.   3. There is normal left ventricular diastolic function, with normal   filling pressure.   4. No evidence of LV thrombus on contrast images.   5. Normal right ventricular size and function.   6. Sclerotic aortic valve with normal opening.   7. No hemodynamically significant valvular regurgitation or stenosis.   8. Adequate TR velocity was not obtained to accurately assess RVSP.   9. Endocardial visualization was enhanced with intravenous echo contrast.    PHYSICIAN INTERPRETATION:  Left Ventricle: Endocardial visualization was enhanced with intravenous   echo contrast. The left ventricular internal cavity size is normal.   Global LV systolic function was normal. Spectral Doppler shows normal   pattern of LV diastolic filling. Normal LV filling pressures. No evidence   of LV thrombus on contrast images.  RightVentricle: Normal right ventricular size and function.  Left Atrium: Normal left atrial size.  Right Atrium: Normal right atrial size.  Pericardium: There is no evidence of pericardial effusion.  Mitral Valve: Structurally normal mitral valve, with normal leaflet   excursion. No evidence of mitral stenosis. Trace mitral valve   regurgitation is seen.  Tricuspid Valve: Structurally normal tricuspid valve, with normal leaflet   excursion. Trivial tricuspid regurgitation is visualized. Adequate TR   velocity was not obtained to accurately assess RVSP.  Aortic Valve: No evidence of aortic stenosis. Sclerotic aortic valve with   normal opening. No evidence of aortic valve regurgitation is seen.  Pulmonic Valve: The pulmonic valve was not well visualized.  Aorta: The aortic root and ascending aorta are structurally normal, with   no evidence of dilitation.  Venous: The inferior vena cava is not well visualized.  In comparison to the previous echocardiogram(s): There are no prior   studies on this patient for comparison purposes.    < end of copied text >      Allergies  No Known Allergies  Intolerances      MEDICATIONS  (STANDING):  acetaminophen   IVPB .. 1000 milliGRAM(s) IV Intermittent once  albuterol/ipratropium for Nebulization 3 milliLiter(s) Nebulizer every 6 hours  aspirin 325 milliGRAM(s) Oral daily  atorvastatin 80 milliGRAM(s) Oral at bedtime  bisacodyl Suppository 10 milliGRAM(s) Rectal once  cefepime   IVPB 2000 milliGRAM(s) IV Intermittent every 12 hours  dextrose 5%. 1000 milliLiter(s) (50 mL/Hr) IV Continuous <Continuous>  dextrose 5%. 1000 milliLiter(s) (100 mL/Hr) IV Continuous <Continuous>  dextrose 50% Injectable 50 milliLiter(s) IV Push every 15 minutes  dextrose 50% Injectable 25 milliLiter(s) IV Push every 15 minutes  famotidine    Tablet 20 milliGRAM(s) Oral two times a day  glucagon  Injectable 1 milliGRAM(s) IntraMuscular once  heparin   Injectable 3000 Unit(s) SubCutaneous every 12 hours  insulin lispro (ADMELOG) corrective regimen sliding scale   SubCutaneous three times a day before meals  levothyroxine 88 MICROGram(s) Oral daily  meperidine     Injectable 25 milliGRAM(s) IV Push once  metoprolol tartrate 12.5 milliGRAM(s) Oral every 12 hours  nitroglycerin  Infusion 15 MICROgram(s)/Min (4.5 mL/Hr) IV Continuous <Continuous>  polyethylene glycol 3350 17 Gram(s) Oral daily  potassium chloride    Tablet ER 40 milliEquivalent(s) Oral every 4 hours  senna 2 Tablet(s) Oral at bedtime  sodium chloride 0.9%. 1000 milliLiter(s) (20 mL/Hr) IV Continuous <Continuous>  tamsulosin 0.4 milliGRAM(s) Oral at bedtime    MEDICATIONS  (PRN):  dextrose Oral Gel 15 Gram(s) Oral once PRN Blood Glucose LESS THAN 70 milliGRAM(s)/deciliter  HYDROmorphone  Injectable 0.5 milliGRAM(s) IV Push every 6 hours PRN Breakthrough Pain  melatonin 5 milliGRAM(s) Oral at bedtime PRN Sleep  ondansetron Injectable 4 milliGRAM(s) IV Push every 6 hours PRN Nausea and/or Vomiting  oxyCODONE    IR 10 milliGRAM(s) Oral every 4 hours PRN Severe Pain (7 - 10)  oxyCODONE    IR 5 milliGRAM(s) Oral every 4 hours PRN Moderate Pain (4 - 6)    Home Medications:  Flomax 0.4 mg oral capsule: 1 cap(s) orally once a day (31 Jul 2024 17:17)  levothyroxine 88 mcg (0.088 mg) oral tablet: 1 tab(s) orally once a day (31 Jul 2024 17:17)  Metoprolol Succinate ER 25 mg oral tablet, extended release: 1 tab(s) orally once a day (31 Jul 2024 17:17)  Ranexa 500 mg oral tablet, extended release: 1 tab(s) orally 2 times a day (31 Jul 2024 17:17)  rosuvastatin 20 mg oral tablet: 1 tab(s) orally once a day (at bedtime) (31 Jul 2024 17:17)      Assessment/Plan:  69y Male status-post CABGX4 POD#2  - Case and plan discussed with CTU Intensivist and CT Surgeon - Dr. Earl  - Continue CTU supportive care and ongoing plan of care as per continuing CTU rounds.   - Continue DVT/GI prophylaxis  - Incentive Spirometry 10 times an hour  - Continue to advance physical activity as tolerated and continue PT/OT as directed  1. CAD s/p CABG: Continue ASA, statin, BB  2. BPH: resume home dose flomax.  3. Hypothyroidism: resume home dose synthroid.     OPERATIVE PROCEDURE(s):  CABGx4              POD # 2                      SURGEON(s): NATI Earl    HPI: Patient is a 69 year-old current life long smoker with a past medical history of HTN, HLD. hypothyroidism, BPH, CKD (baseline Cr 1.3-1.4) presented today for an elective cardiac catheterization. Patient states for several months he felt worsening shortness of breath when walking uphill and 1-2 flights of stairs, relieved with rest. Patient also complains of occasional dizziness with postural changes. Patient saw a cardiologist who recommended a CCTA. Bullous emphysema on CT scan. On 7/12/24, he underwent a CCTA, revealing a score of 722. Patient underwent a cardiac catheterization via right radial artery, revealing 3vCAD. On 08/02/24, he underwent surgical myocardial revascularization.      SUBJECTIVE ASSESSMENT:69yMale patient seen and examined at bedside.    Vital Signs Last 24 Hrs  T(F): 99.6 (04 Aug 2024 04:00), Max: 99.9 (03 Aug 2024 20:00)  HR: 81 (04 Aug 2024 06:00) (70 - 83)  BP: 114/63 (03 Aug 2024 20:00) (114/63 - 114/63)  BP(mean): 84 (03 Aug 2024 20:00) (84 - 84)  ABP: 127/61 (04 Aug 2024 06:00) (-21/-21 - 139/62)  ABP(mean): 81 (04 Aug 2024 06:00)  RR: 29 (04 Aug 2024 06:00) (20 - 38)  SpO2: 95% (04 Aug 2024 06:00) (93% - 100%) 2L NC    I&O's Detail    02 Aug 2024 07:01  -  03 Aug 2024 07:00  --------------------------------------------------------  IN:    Albumin 5%  - 500 mL: 825 mL    Amiodarone: 200.4 mL    Dexmedetomidine: 75 mL    Insulin: 24 mL    IV PiggyBack: 100 mL    IV PiggyBack: 350 mL    IV PiggyBack: 50 mL    IV PiggyBack: 100 mL    IV PiggyBack: 500 mL    NiCARdipine: 30 mL    Nitroglycerin: 215 mL    Oral Fluid: 100 mL    sodium chloride 0.9%: 340 mL  Total IN: 2909.4 mL    OUT:    Left Chest Tube (mL): 106 mL    MS: Chest Tube (mL): 410 mL    Indwelling Catheter - Urethral (mL): 2275 mL    Nasogastric/Oral tube (mL): 0 mL  Total OUT: 2791 mL    Net: I&O's Detail    01 Aug 2024 07:01  -  02 Aug 2024 07:00  --------------------------------------------------------  Total NET: -960 mL    02 Aug 2024 07:01  -  03 Aug 2024 07:00  --------------------------------------------------------  Total NET: 118.4 mL    CAPILLARY BLOOD GLUCOSE  POCT Blood Glucose.: 114 mg/dL (04 Aug 2024 06:40)  POCT Blood Glucose.: 116 mg/dL (03 Aug 2024 22:31)  POCT Blood Glucose.: 119 mg/dL (03 Aug 2024 16:58)  POCT Blood Glucose.: 121 mg/dL (03 Aug 2024 12:27)  POCT Blood Glucose.: 126 mg/dL (03 Aug 2024 08:56)  POCT Blood Glucose.: 143 mg/dL (03 Aug 2024 06:59)    A1C with Estimated Average Glucose Result: 5.8 % (07-31-24 @ 16:28)      Physical Exam:  General: NAD; A&Ox3  Cardiac: S1/S2, RRR, no murmur, no rubs  Lungs: unlabored respirations, CTA b/l, no wheeze, no rales, no crackles  Abdomen: Soft/NT/ND; positive bowel sounds x 4  Sternum: Intact, no click, incision healing well with no drainage  Incisions: Incisions clean/dry/intact  Extremities: No edema b/l lower extremities; good capillary refill; no cyanosis; palpable 1+ pedal pulses b/l       LABS:                        9.1<L>  8.94  )-----------( 119<L>    ( 04 Aug 2024 02:10 )             27.0<L>                        8.5<L>  6.95  )-----------( 109<L>    ( 03 Aug 2024 01:30 )             26.2<L>    08-04    136  |  104  |  8<L>  ----------------------------<  106<H>  3.8   |  21  |  0.9  08-03    135  |  104  |  9<L>  ----------------------------<  127<H>  4.0   |  21  |  0.9    Ca    7.9<L>      04 Aug 2024 02:10  Mg     2.2     08-04    TPro  5.7<L> [6.0 - 8.0]  /  Alb  4.0 [3.5 - 5.2]  /  TBili  0.7 [0.2 - 1.2]  /  DBili  x   /  AST  62<H> [0 - 41]  /  ALT  16 [0 - 41]  /  AlkPhos  49 [30 - 115]  08-04    PT/INR - ( 04 Aug 2024 02:10 )   PT: ;   INR: 1.37 ratio       PT/INR - ( 03 Aug 2024 01:30 )   PT: ;   INR: 1.22 ratio       PTT - ( 04 Aug 2024 02:10 )  PTT:32.1 sec, PTT - ( 03 Aug 2024 01:30 )  PTT:30.3 sec    ABG - ( 03 Aug 2024 03:59 )  pH: 7.39  /  pCO2: 34    /  pO2: 125   / HCO3: 21    / Base Excess: -3.9  /  SaO2: 98.5  /  LA: 0.6      < from: TTE Echo Complete w/ Contrast w/ Doppler (08.01.24 @ 10:17) >  Summary:   1. Normal global left ventricular systolic function.   2. LV Ejection Fraction by Kim's Method with a biplane EF of 58 %.   3. There is normal left ventricular diastolic function, with normal   filling pressure.   4. No evidence of LV thrombus on contrast images.   5. Normal right ventricular size and function.   6. Sclerotic aortic valve with normal opening.   7. No hemodynamically significant valvular regurgitation or stenosis.   8. Adequate TR velocity was not obtained to accurately assess RVSP.   9. Endocardial visualization was enhanced with intravenous echo contrast.    PHYSICIAN INTERPRETATION:  Left Ventricle: Endocardial visualization was enhanced with intravenous   echo contrast. The left ventricular internal cavity size is normal.   Global LV systolic function was normal. Spectral Doppler shows normal   pattern of LV diastolic filling. Normal LV filling pressures. No evidence   of LV thrombus on contrast images.  RightVentricle: Normal right ventricular size and function.  Left Atrium: Normal left atrial size.  Right Atrium: Normal right atrial size.  Pericardium: There is no evidence of pericardial effusion.  Mitral Valve: Structurally normal mitral valve, with normal leaflet   excursion. No evidence of mitral stenosis. Trace mitral valve   regurgitation is seen.  Tricuspid Valve: Structurally normal tricuspid valve, with normal leaflet   excursion. Trivial tricuspid regurgitation is visualized. Adequate TR   velocity was not obtained to accurately assess RVSP.  Aortic Valve: No evidence of aortic stenosis. Sclerotic aortic valve with   normal opening. No evidence of aortic valve regurgitation is seen.  Pulmonic Valve: The pulmonic valve was not well visualized.  Aorta: The aortic root and ascending aorta are structurally normal, with   no evidence of dilitation.  Venous: The inferior vena cava is not well visualized.  In comparison to the previous echocardiogram(s): There are no prior   studies on this patient for comparison purposes.    < end of copied text >      Allergies  No Known Allergies  Intolerances      MEDICATIONS  (STANDING):  acetaminophen   IVPB .. 1000 milliGRAM(s) IV Intermittent once  albuterol/ipratropium for Nebulization 3 milliLiter(s) Nebulizer every 6 hours  aspirin 325 milliGRAM(s) Oral daily  atorvastatin 80 milliGRAM(s) Oral at bedtime  bisacodyl Suppository 10 milliGRAM(s) Rectal once  cefepime   IVPB 2000 milliGRAM(s) IV Intermittent every 12 hours  dextrose 5%. 1000 milliLiter(s) (50 mL/Hr) IV Continuous <Continuous>  dextrose 5%. 1000 milliLiter(s) (100 mL/Hr) IV Continuous <Continuous>  dextrose 50% Injectable 50 milliLiter(s) IV Push every 15 minutes  dextrose 50% Injectable 25 milliLiter(s) IV Push every 15 minutes  famotidine    Tablet 20 milliGRAM(s) Oral two times a day  glucagon  Injectable 1 milliGRAM(s) IntraMuscular once  heparin   Injectable 3000 Unit(s) SubCutaneous every 12 hours  insulin lispro (ADMELOG) corrective regimen sliding scale   SubCutaneous three times a day before meals  levothyroxine 88 MICROGram(s) Oral daily  meperidine     Injectable 25 milliGRAM(s) IV Push once  metoprolol tartrate 12.5 milliGRAM(s) Oral every 12 hours  nitroglycerin  Infusion 15 MICROgram(s)/Min (4.5 mL/Hr) IV Continuous <Continuous>  polyethylene glycol 3350 17 Gram(s) Oral daily  potassium chloride    Tablet ER 40 milliEquivalent(s) Oral every 4 hours  senna 2 Tablet(s) Oral at bedtime  sodium chloride 0.9%. 1000 milliLiter(s) (20 mL/Hr) IV Continuous <Continuous>  tamsulosin 0.4 milliGRAM(s) Oral at bedtime    MEDICATIONS  (PRN):  dextrose Oral Gel 15 Gram(s) Oral once PRN Blood Glucose LESS THAN 70 milliGRAM(s)/deciliter  HYDROmorphone  Injectable 0.5 milliGRAM(s) IV Push every 6 hours PRN Breakthrough Pain  melatonin 5 milliGRAM(s) Oral at bedtime PRN Sleep  ondansetron Injectable 4 milliGRAM(s) IV Push every 6 hours PRN Nausea and/or Vomiting  oxyCODONE    IR 10 milliGRAM(s) Oral every 4 hours PRN Severe Pain (7 - 10)  oxyCODONE    IR 5 milliGRAM(s) Oral every 4 hours PRN Moderate Pain (4 - 6)    Home Medications:  Flomax 0.4 mg oral capsule: 1 cap(s) orally once a day (31 Jul 2024 17:17)  levothyroxine 88 mcg (0.088 mg) oral tablet: 1 tab(s) orally once a day (31 Jul 2024 17:17)  Metoprolol Succinate ER 25 mg oral tablet, extended release: 1 tab(s) orally once a day (31 Jul 2024 17:17)  Ranexa 500 mg oral tablet, extended release: 1 tab(s) orally 2 times a day (31 Jul 2024 17:17)  rosuvastatin 20 mg oral tablet: 1 tab(s) orally once a day (at bedtime) (31 Jul 2024 17:17)      Assessment/Plan:  69y Male status-post CABGX4 POD#2  - Case and plan discussed with CTU Intensivist and CT Surgeon - Dr. Earl  - Continue CTU supportive care and ongoing plan of care as per continuing CTU rounds.   - Continue DVT/GI prophylaxis  - Incentive Spirometry 10 times an hour  - Continue to advance physical activity as tolerated and continue PT/OT as directed  1. CAD s/p CABG: Continue ASA, statin, BB  2. BPH: cont Flomax 0.4 mg oral capsule: 1 cap(s) orally once a day.  3. Hypothyroidism: Cont home dose levothyroxine 88 mcg (0.088 mg) oral tablet: 1 tab(s) orally once a day.

## 2024-08-05 LAB
ALBUMIN SERPL ELPH-MCNC: 3.8 G/DL — SIGNIFICANT CHANGE UP (ref 3.5–5.2)
ALBUMIN SERPL ELPH-MCNC: 3.9 G/DL — SIGNIFICANT CHANGE UP (ref 3.5–5.2)
ALP SERPL-CCNC: 53 U/L — SIGNIFICANT CHANGE UP (ref 30–115)
ALP SERPL-CCNC: 56 U/L — SIGNIFICANT CHANGE UP (ref 30–115)
ALT FLD-CCNC: 11 U/L — SIGNIFICANT CHANGE UP (ref 0–41)
ALT FLD-CCNC: 12 U/L — SIGNIFICANT CHANGE UP (ref 0–41)
ANION GAP SERPL CALC-SCNC: 9 MMOL/L — SIGNIFICANT CHANGE UP (ref 7–14)
ANION GAP SERPL CALC-SCNC: 9 MMOL/L — SIGNIFICANT CHANGE UP (ref 7–14)
AST SERPL-CCNC: 26 U/L — SIGNIFICANT CHANGE UP (ref 0–41)
AST SERPL-CCNC: 34 U/L — SIGNIFICANT CHANGE UP (ref 0–41)
BASOPHILS # BLD AUTO: 0.08 K/UL — SIGNIFICANT CHANGE UP (ref 0–0.2)
BASOPHILS NFR BLD AUTO: 0.8 % — SIGNIFICANT CHANGE UP (ref 0–1)
BILIRUB SERPL-MCNC: 0.3 MG/DL — SIGNIFICANT CHANGE UP (ref 0.2–1.2)
BILIRUB SERPL-MCNC: 0.6 MG/DL — SIGNIFICANT CHANGE UP (ref 0.2–1.2)
BUN SERPL-MCNC: 14 MG/DL — SIGNIFICANT CHANGE UP (ref 10–20)
BUN SERPL-MCNC: 16 MG/DL — SIGNIFICANT CHANGE UP (ref 10–20)
CALCIUM SERPL-MCNC: 8.5 MG/DL — SIGNIFICANT CHANGE UP (ref 8.4–10.5)
CALCIUM SERPL-MCNC: 8.9 MG/DL — SIGNIFICANT CHANGE UP (ref 8.4–10.4)
CHLORIDE SERPL-SCNC: 105 MMOL/L — SIGNIFICANT CHANGE UP (ref 98–110)
CHLORIDE SERPL-SCNC: 107 MMOL/L — SIGNIFICANT CHANGE UP (ref 98–110)
CO2 SERPL-SCNC: 24 MMOL/L — SIGNIFICANT CHANGE UP (ref 17–32)
CO2 SERPL-SCNC: 26 MMOL/L — SIGNIFICANT CHANGE UP (ref 17–32)
CREAT SERPL-MCNC: 0.9 MG/DL — SIGNIFICANT CHANGE UP (ref 0.7–1.5)
CREAT SERPL-MCNC: 0.9 MG/DL — SIGNIFICANT CHANGE UP (ref 0.7–1.5)
EGFR: 94 ML/MIN/1.73M2 — SIGNIFICANT CHANGE UP
EGFR: 94 ML/MIN/1.73M2 — SIGNIFICANT CHANGE UP
EOSINOPHIL # BLD AUTO: 0.18 K/UL — SIGNIFICANT CHANGE UP (ref 0–0.7)
EOSINOPHIL NFR BLD AUTO: 1.9 % — SIGNIFICANT CHANGE UP (ref 0–8)
GLUCOSE BLDC GLUCOMTR-MCNC: 101 MG/DL — HIGH (ref 70–99)
GLUCOSE BLDC GLUCOMTR-MCNC: 105 MG/DL — HIGH (ref 70–99)
GLUCOSE BLDC GLUCOMTR-MCNC: 111 MG/DL — HIGH (ref 70–99)
GLUCOSE BLDC GLUCOMTR-MCNC: 99 MG/DL — SIGNIFICANT CHANGE UP (ref 70–99)
GLUCOSE SERPL-MCNC: 100 MG/DL — HIGH (ref 70–99)
GLUCOSE SERPL-MCNC: 96 MG/DL — SIGNIFICANT CHANGE UP (ref 70–99)
HCT VFR BLD CALC: 27.3 % — LOW (ref 42–52)
HGB BLD-MCNC: 9 G/DL — LOW (ref 14–18)
IMM GRANULOCYTES NFR BLD AUTO: 0.5 % — HIGH (ref 0.1–0.3)
LYMPHOCYTES # BLD AUTO: 1.3 K/UL — SIGNIFICANT CHANGE UP (ref 1.2–3.4)
LYMPHOCYTES # BLD AUTO: 13.6 % — LOW (ref 20.5–51.1)
MAGNESIUM SERPL-MCNC: 2.2 MG/DL — SIGNIFICANT CHANGE UP (ref 1.8–2.4)
MAGNESIUM SERPL-MCNC: 2.2 MG/DL — SIGNIFICANT CHANGE UP (ref 1.8–2.4)
MCHC RBC-ENTMCNC: 30.1 PG — SIGNIFICANT CHANGE UP (ref 27–31)
MCHC RBC-ENTMCNC: 33 G/DL — SIGNIFICANT CHANGE UP (ref 32–37)
MCV RBC AUTO: 91.3 FL — SIGNIFICANT CHANGE UP (ref 80–94)
MONOCYTES # BLD AUTO: 0.69 K/UL — HIGH (ref 0.1–0.6)
MONOCYTES NFR BLD AUTO: 7.2 % — SIGNIFICANT CHANGE UP (ref 1.7–9.3)
NEUTROPHILS # BLD AUTO: 7.25 K/UL — HIGH (ref 1.4–6.5)
NEUTROPHILS NFR BLD AUTO: 76 % — HIGH (ref 42.2–75.2)
NRBC # BLD: 0 /100 WBCS — SIGNIFICANT CHANGE UP (ref 0–0)
PLATELET # BLD AUTO: 128 K/UL — LOW (ref 130–400)
PMV BLD: 10.5 FL — HIGH (ref 7.4–10.4)
POTASSIUM SERPL-MCNC: 4.3 MMOL/L — SIGNIFICANT CHANGE UP (ref 3.5–5)
POTASSIUM SERPL-MCNC: 4.5 MMOL/L — SIGNIFICANT CHANGE UP (ref 3.5–5)
POTASSIUM SERPL-SCNC: 4.3 MMOL/L — SIGNIFICANT CHANGE UP (ref 3.5–5)
POTASSIUM SERPL-SCNC: 4.5 MMOL/L — SIGNIFICANT CHANGE UP (ref 3.5–5)
PROT SERPL-MCNC: 5.8 G/DL — LOW (ref 6–8)
PROT SERPL-MCNC: 5.9 G/DL — LOW (ref 6–8)
RBC # BLD: 2.99 M/UL — LOW (ref 4.7–6.1)
RBC # FLD: 14.6 % — HIGH (ref 11.5–14.5)
SODIUM SERPL-SCNC: 140 MMOL/L — SIGNIFICANT CHANGE UP (ref 135–146)
SODIUM SERPL-SCNC: 140 MMOL/L — SIGNIFICANT CHANGE UP (ref 135–146)
WBC # BLD: 9.55 K/UL — SIGNIFICANT CHANGE UP (ref 4.8–10.8)
WBC # FLD AUTO: 9.55 K/UL — SIGNIFICANT CHANGE UP (ref 4.8–10.8)

## 2024-08-05 PROCEDURE — 93010 ELECTROCARDIOGRAM REPORT: CPT

## 2024-08-05 PROCEDURE — 99232 SBSQ HOSP IP/OBS MODERATE 35: CPT

## 2024-08-05 PROCEDURE — 71045 X-RAY EXAM CHEST 1 VIEW: CPT | Mod: 26

## 2024-08-05 RX ORDER — POTASSIUM CHLORIDE 1500 MG/1
20 TABLET, EXTENDED RELEASE ORAL ONCE
Refills: 0 | Status: COMPLETED | OUTPATIENT
Start: 2024-08-05 | End: 2024-08-05

## 2024-08-05 RX ORDER — CHLORHEXIDINE GLUCONATE 500 MG/1
1 CLOTH TOPICAL
Refills: 0 | Status: DISCONTINUED | OUTPATIENT
Start: 2024-08-05 | End: 2024-08-09

## 2024-08-05 RX ADMIN — Medication 88 MICROGRAM(S): at 06:15

## 2024-08-05 RX ADMIN — FAMOTIDINE 20 MILLIGRAM(S): 40 TABLET, FILM COATED ORAL at 06:14

## 2024-08-05 RX ADMIN — Medication 1000 MILLIGRAM(S): at 01:00

## 2024-08-05 RX ADMIN — HEPARIN SODIUM 3000 UNIT(S): 1000 INJECTION, SOLUTION INTRAVENOUS; SUBCUTANEOUS at 06:14

## 2024-08-05 RX ADMIN — Medication 1000 MILLIGRAM(S): at 06:51

## 2024-08-05 RX ADMIN — Medication 1000 MILLIGRAM(S): at 11:35

## 2024-08-05 RX ADMIN — Medication 325 MILLIGRAM(S): at 11:20

## 2024-08-05 RX ADMIN — Medication 0.4 MILLIGRAM(S): at 22:51

## 2024-08-05 RX ADMIN — Medication 400 MILLIGRAM(S): at 06:14

## 2024-08-05 RX ADMIN — POTASSIUM CHLORIDE 20 MILLIEQUIVALENT(S): 1500 TABLET, EXTENDED RELEASE ORAL at 11:22

## 2024-08-05 RX ADMIN — HEPARIN SODIUM 3000 UNIT(S): 1000 INJECTION, SOLUTION INTRAVENOUS; SUBCUTANEOUS at 17:32

## 2024-08-05 RX ADMIN — FUROSEMIDE 20 MILLIGRAM(S): 10 INJECTION, SOLUTION INTRAVENOUS at 06:15

## 2024-08-05 RX ADMIN — IPRATROPIUM BROMIDE AND ALBUTEROL SULFATE 3 MILLILITER(S): 2.5; .5 SOLUTION RESPIRATORY (INHALATION) at 14:48

## 2024-08-05 RX ADMIN — ATORVASTATIN CALCIUM 80 MILLIGRAM(S): 40 TABLET, FILM COATED ORAL at 22:51

## 2024-08-05 RX ADMIN — Medication 400 MILLIGRAM(S): at 11:20

## 2024-08-05 RX ADMIN — CEFEPIME HYDROCHLORIDE 100 MILLIGRAM(S): 1 INJECTION, POWDER, FOR SOLUTION INTRAMUSCULAR; INTRAVENOUS at 06:14

## 2024-08-05 RX ADMIN — Medication 5 MILLIGRAM(S): at 22:51

## 2024-08-05 RX ADMIN — FAMOTIDINE 20 MILLIGRAM(S): 40 TABLET, FILM COATED ORAL at 17:32

## 2024-08-05 RX ADMIN — POTASSIUM CHLORIDE 20 MILLIEQUIVALENT(S): 1500 TABLET, EXTENDED RELEASE ORAL at 06:14

## 2024-08-05 RX ADMIN — Medication 17 GRAM(S): at 11:20

## 2024-08-05 RX ADMIN — Medication 12.5 MILLIGRAM(S): at 06:15

## 2024-08-05 RX ADMIN — SENNOSIDES 2 TABLET(S): 8.6 TABLET ORAL at 22:51

## 2024-08-05 RX ADMIN — IPRATROPIUM BROMIDE AND ALBUTEROL SULFATE 3 MILLILITER(S): 2.5; .5 SOLUTION RESPIRATORY (INHALATION) at 08:24

## 2024-08-05 NOTE — PROGRESS NOTE ADULT - SUBJECTIVE AND OBJECTIVE BOX
OPERATIVE PROCEDURE(s):                POD #                       69yMale  SURGEON(s): NATI Earl  SUBJECTIVE ASSESSMENT:   Vital Signs Last 24 Hrs  T(F): 99 (05 Aug 2024 04:00), Max: 99.3 (04 Aug 2024 20:00)  HR: 75 (05 Aug 2024 06:00) (71 - 90)  BP: 109/69 (05 Aug 2024 06:00) (88/60 - 137/66)  BP(mean): 84 (05 Aug 2024 06:00) (66 - 94)  ABP: 109/58 (04 Aug 2024 12:00) (97/51 - 124/62)  ABP(mean): 75 (04 Aug 2024 12:00)  RR: 21 (05 Aug 2024 06:00) (20 - 41)  SpO2: 95% (05 Aug 2024 06:00) (94% - 99%)  CVP(mm Hg): --  CVP(cm H2O): --  CO: --  CI: --  PA: --  SVR: --    I&O's Detail    04 Aug 2024 07:01  -  05 Aug 2024 07:00  --------------------------------------------------------  IN:    IV PiggyBack: 100 mL    IV PiggyBack: 300 mL    Oral Fluid: 1335 mL  Total IN: 1735 mL    OUT:    Chest Tube (mL): 40 mL    Chest Tube (mL): 30 mL    Indwelling Catheter - Urethral (mL): 1015 mL    Voided (mL): 2100 mL  Total OUT: 3185 mL        Net:   I&O's Detail    03 Aug 2024 07:01  -  04 Aug 2024 07:00  --------------------------------------------------------  Total NET: -727.6 mL      04 Aug 2024 07:01  -  05 Aug 2024 07:00  --------------------------------------------------------  Total NET: -1450 mL        CAPILLARY BLOOD GLUCOSE      POCT Blood Glucose.: 105 mg/dL (05 Aug 2024 06:45)  POCT Blood Glucose.: 145 mg/dL (04 Aug 2024 14:59)  POCT Blood Glucose.: 128 mg/dL (04 Aug 2024 11:27)    Physical Exam:  General: NAD; A&Ox3/Patient is intubated and sedated  Cardiac: S1/S2, RRR, no murmur, no rubs  Lungs: unlabored respirations, CTA b/l, no wheeze, no rales, no crackles  Abdomen: Soft/NT/ND; positive bowel sounds x 4  Sternum: Intact, no click, incision healing well with no drainage  Incisions: Incisions clean/dry/intact  Extremities: No edema b/l lower extremities; good capillary refill; no cyanosis; palpable 1+ pedal pulses b/l    Central Venous Catheter: Yes[]  No[] , If Yes indication:           Day #  [place] Catheter: Yes  [] , No  [] , If yes indication:                      Day #  NGT: Yes [] No [] ,    If Yes Placement:                                     Day #  EPICARDIAL WIRES:  [] YES [] NO                                              Day #  BOWEL MOVEMENT:  [] YES [] NO, If No, Timing since last BM:      Day #  CHEST TUBE(Left/Right):  [] YES [] NO, If yes -  AIR LEAKS:  [] YES [] NO        LABS:                        9.0<L>  9.55  )-----------( 128<L>    ( 05 Aug 2024 01:52 )             27.3<L>                        9.1<L>  8.94  )-----------( 119<L>    ( 04 Aug 2024 02:10 )             27.0<L>    08-05    140  |  107  |  14  ----------------------------<  100<H>  4.3   |  24  |  0.9  08-04    137  |  104  |  11  ----------------------------<  126<H>  4.5   |  23  |  1.0    Ca    8.5      05 Aug 2024 01:52  Mg     2.2     08-05    TPro  5.8<L> [6.0 - 8.0]  /  Alb  3.8 [3.5 - 5.2]  /  TBili  0.6 [0.2 - 1.2]  /  DBili  x   /  AST  34 [0 - 41]  /  ALT  12 [0 - 41]  /  AlkPhos  56 [30 - 115]  08-05    PT/INR - ( 04 Aug 2024 02:10 )   PT: ;   INR: 1.37 ratio         PTT - ( 04 Aug 2024 02:10 )  PTT:32.1 sec  Urinalysis Basic - ( 05 Aug 2024 01:52 )    Color: x / Appearance: x / SG: x / pH: x  Gluc: 100 mg/dL / Ketone: x  / Bili: x / Urobili: x   Blood: x / Protein: x / Nitrite: x   Leuk Esterase: x / RBC: x / WBC x   Sq Epi: x / Non Sq Epi: x / Bacteria: x        RADIOLOGY & ADDITIONAL TESTS:  CXR:  EKG:  MEDICATIONS  (STANDING):  acetaminophen   IVPB .. 1000 milliGRAM(s) IV Intermittent once  albuterol/ipratropium for Nebulization 3 milliLiter(s) Nebulizer every 6 hours  aspirin 325 milliGRAM(s) Oral daily  atorvastatin 80 milliGRAM(s) Oral at bedtime  bisacodyl Suppository 10 milliGRAM(s) Rectal once  cefepime   IVPB 2000 milliGRAM(s) IV Intermittent every 12 hours  dextrose 5%. 1000 milliLiter(s) (100 mL/Hr) IV Continuous <Continuous>  dextrose 5%. 1000 milliLiter(s) (50 mL/Hr) IV Continuous <Continuous>  dextrose 50% Injectable 50 milliLiter(s) IV Push every 15 minutes  dextrose 50% Injectable 25 milliLiter(s) IV Push every 15 minutes  famotidine    Tablet 20 milliGRAM(s) Oral two times a day  furosemide   Injectable 20 milliGRAM(s) IV Push daily  glucagon  Injectable 1 milliGRAM(s) IntraMuscular once  heparin   Injectable 3000 Unit(s) SubCutaneous every 12 hours  insulin lispro (ADMELOG) corrective regimen sliding scale   SubCutaneous three times a day before meals  levothyroxine 88 MICROGram(s) Oral daily  meperidine     Injectable 25 milliGRAM(s) IV Push once  metoprolol tartrate 12.5 milliGRAM(s) Oral every 12 hours  nitroglycerin  Infusion 15 MICROgram(s)/Min (4.5 mL/Hr) IV Continuous <Continuous>  polyethylene glycol 3350 17 Gram(s) Oral daily  potassium chloride    Tablet ER 20 milliEquivalent(s) Oral daily  senna 2 Tablet(s) Oral at bedtime  sodium chloride 0.9%. 1000 milliLiter(s) (20 mL/Hr) IV Continuous <Continuous>  tamsulosin 0.4 milliGRAM(s) Oral at bedtime    MEDICATIONS  (PRN):  dextrose Oral Gel 15 Gram(s) Oral once PRN Blood Glucose LESS THAN 70 milliGRAM(s)/deciliter  melatonin 5 milliGRAM(s) Oral at bedtime PRN Sleep  ondansetron Injectable 4 milliGRAM(s) IV Push every 6 hours PRN Nausea and/or Vomiting  oxyCODONE    IR 10 milliGRAM(s) Oral every 4 hours PRN Severe Pain (7 - 10)  oxyCODONE    IR 5 milliGRAM(s) Oral every 4 hours PRN Moderate Pain (4 - 6)    HEPARIN:  [] YES [] NO  Dose: XX UNITS/HR UNITS Q8H  LOVENOX:[] YES [] NO  Dose: XX mg Q24H  COUMADIN: []  YES [] NO  Dose: XX mg  Q24H  SCD's: YES b/l  GI Prophylaxis: Protonix [], Pepcid [], None [], (Contra-indication:.....)    Post-Op Beta-Blockers: Yes [], No[], If No, then contraindication:  Post-Op Aspirin: Yes [],  No [], If No, then contraindication:  Post-Op Statin: Yes [], No[], If No, then contraindication:  Allergies    No Known Allergies    Intolerances      Ambulation/Activity Status:    Assessment/Plan:  69y Male status-post .....  - Case and plan discussed with CTU Intensivist and CT Surgeon - Dr. Merritt/Boyd/Rajat  - Continue CTU supportive care    - Continue DVT/GI prophylaxis  - Incentive Spirometry 10 times an hour  - Continue to advance physical activity as tolerated and continue PT/OT as directed  1. CAD: Continue ASA, statin, BB  2. HTN:   3. A. Fib:   4. COPD/Hypoxia:   5. DM/Glucose Control:     Social Service Disposition:     OPERATIVE PROCEDURE(s): CABGx4                POD #     3                  69yMale  SURGEON(s): NATI Earl  SUBJECTIVE ASSESSMENT: pt seen and examined. no acute complaints at this time.   Vital Signs Last 24 Hrs  T(F): 99 (05 Aug 2024 04:00), Max: 99.3 (04 Aug 2024 20:00)  HR: 75 (05 Aug 2024 06:00) (71 - 90)  BP: 109/69 (05 Aug 2024 06:00) (88/60 - 137/66)  BP(mean): 84 (05 Aug 2024 06:00) (66 - 94)  ABP: 109/58 (04 Aug 2024 12:00) (97/51 - 124/62)  ABP(mean): 75 (04 Aug 2024 12:00)  RR: 21 (05 Aug 2024 06:00) (20 - 41)  SpO2: 95% (05 Aug 2024 06:00) (94% - 99%)      I&O's Detail    04 Aug 2024 07:01  -  05 Aug 2024 07:00  --------------------------------------------------------  IN:    IV PiggyBack: 100 mL    IV PiggyBack: 300 mL    Oral Fluid: 1335 mL  Total IN: 1735 mL    OUT:    Chest Tube (mL): 40 mL    Chest Tube (mL): 30 mL    Indwelling Catheter - Urethral (mL): 1015 mL    Voided (mL): 2100 mL  Total OUT: 3185 mL        Net:   I&O's Detail    03 Aug 2024 07:01  -  04 Aug 2024 07:00  --------------------------------------------------------  Total NET: -727.6 mL      04 Aug 2024 07:01  -  05 Aug 2024 07:00  --------------------------------------------------------  Total NET: -1450 mL        CAPILLARY BLOOD GLUCOSE      POCT Blood Glucose.: 105 mg/dL (05 Aug 2024 06:45)  POCT Blood Glucose.: 145 mg/dL (04 Aug 2024 14:59)  POCT Blood Glucose.: 128 mg/dL (04 Aug 2024 11:27)    Physical Exam:  General: NAD; A&Ox3, no focal deficits, clear speech   Cardiac: S1/S2, RRR, no murmur, no rubs  Lungs: unlabored respirations, CTA b/l, no wheeze, no rales, no crackles  Abdomen: Soft/NT/ND; positive bowel sounds x 4  Sternum: Intact, no click, incision healing well with no drainage  Incisions: Incisions clean/dry/intact  Extremities: No edema b/l lower extremities; good capillary refill; no cyanosis; palpable 1+ pedal pulses b/l    Central Venous Catheter: Yes[x]  No[] , If Yes indication:     critical        Day # 3  BOWEL MOVEMENT:  [x] YES [] NO, If No, Timing since last BM:      Day #        LABS:                        9.0<L>  9.55  )-----------( 128<L>    ( 05 Aug 2024 01:52 )             27.3<L>                        9.1<L>  8.94  )-----------( 119<L>    ( 04 Aug 2024 02:10 )             27.0<L>    08-05    140  |  107  |  14  ----------------------------<  100<H>  4.3   |  24  |  0.9  08-04    137  |  104  |  11  ----------------------------<  126<H>  4.5   |  23  |  1.0    Ca    8.5      05 Aug 2024 01:52  Mg     2.2     08-05    TPro  5.8<L> [6.0 - 8.0]  /  Alb  3.8 [3.5 - 5.2]  /  TBili  0.6 [0.2 - 1.2]  /  DBili  x   /  AST  34 [0 - 41]  /  ALT  12 [0 - 41]  /  AlkPhos  56 [30 - 115]  08-05    PT/INR - ( 04 Aug 2024 02:10 )   PT: ;   INR: 1.37 ratio         PTT - ( 04 Aug 2024 02:10 )  PTT:32.1 sec  Urinalysis Basic - ( 05 Aug 2024 01:52 )    Color: x / Appearance: x / SG: x / pH: x  Gluc: 100 mg/dL / Ketone: x  / Bili: x / Urobili: x   Blood: x / Protein: x / Nitrite: x   Leuk Esterase: x / RBC: x / WBC x   Sq Epi: x / Non Sq Epi: x / Bacteria: x        RADIOLOGY & ADDITIONAL TESTS:  CXR: < from: Xray Chest 1 View- PORTABLE-Routine (08.05.24 @ 05:15) >  Impression:    Redemonstration bibasilar opacities and pleural effusions.    < end of copied text >    EKG: e< from: 12 Lead ECG (08.04.24 @ 07:24) >    Ventricular Rate 70 BPM    Atrial Rate 70 BPM    P-R Interval 154 ms    QRS Duration 76 ms    Q-T Interval 418 ms    QTC Calculation(Bazett) 451 ms    P Axis 5 degrees    R Axis -1 degrees    T Axis 54 degrees    Diagnosis Line Normal sinus rhythm  Normal ECG    < end of copied text >    MEDICATIONS  (STANDING):  acetaminophen   IVPB .. 1000 milliGRAM(s) IV Intermittent once  albuterol/ipratropium for Nebulization 3 milliLiter(s) Nebulizer every 6 hours  aspirin 325 milliGRAM(s) Oral daily  atorvastatin 80 milliGRAM(s) Oral at bedtime  bisacodyl Suppository 10 milliGRAM(s) Rectal once  cefepime   IVPB 2000 milliGRAM(s) IV Intermittent every 12 hours  dextrose 5%. 1000 milliLiter(s) (100 mL/Hr) IV Continuous <Continuous>  dextrose 5%. 1000 milliLiter(s) (50 mL/Hr) IV Continuous <Continuous>  dextrose 50% Injectable 50 milliLiter(s) IV Push every 15 minutes  dextrose 50% Injectable 25 milliLiter(s) IV Push every 15 minutes  famotidine    Tablet 20 milliGRAM(s) Oral two times a day  furosemide   Injectable 20 milliGRAM(s) IV Push daily  glucagon  Injectable 1 milliGRAM(s) IntraMuscular once  heparin   Injectable 3000 Unit(s) SubCutaneous every 12 hours  insulin lispro (ADMELOG) corrective regimen sliding scale   SubCutaneous three times a day before meals  levothyroxine 88 MICROGram(s) Oral daily  meperidine     Injectable 25 milliGRAM(s) IV Push once  metoprolol tartrate 12.5 milliGRAM(s) Oral every 12 hours  nitroglycerin  Infusion 15 MICROgram(s)/Min (4.5 mL/Hr) IV Continuous <Continuous>  polyethylene glycol 3350 17 Gram(s) Oral daily  potassium chloride    Tablet ER 20 milliEquivalent(s) Oral daily  senna 2 Tablet(s) Oral at bedtime  sodium chloride 0.9%. 1000 milliLiter(s) (20 mL/Hr) IV Continuous <Continuous>  tamsulosin 0.4 milliGRAM(s) Oral at bedtime    MEDICATIONS  (PRN):  dextrose Oral Gel 15 Gram(s) Oral once PRN Blood Glucose LESS THAN 70 milliGRAM(s)/deciliter  melatonin 5 milliGRAM(s) Oral at bedtime PRN Sleep  ondansetron Injectable 4 milliGRAM(s) IV Push every 6 hours PRN Nausea and/or Vomiting  oxyCODONE    IR 10 milliGRAM(s) Oral every 4 hours PRN Severe Pain (7 - 10)  oxyCODONE    IR 5 milliGRAM(s) Oral every 4 hours PRN Moderate Pain (4 - 6)    HEPARIN:  [x] YES [] NO  Dose: 3000 UNITS Q12H  SCD's: YES b/l  GI Prophylaxis: Protonix [], Pepcid [x], None [], (Contra-indication:.....)    Post-Op Beta-Blockers: Yes [x], No[], If No, then contraindication:  Post-Op Aspirin: Yes [x],  No [], If No, then contraindication:  Post-Op Statin: Yes [x], No[], If No, then contraindication:  Allergies    No Known Allergies    Intolerances      Ambulation/Activity Status: ambulate      Assessment/Plan:  69y Male status-post CABGX4 POD#3  - Case and plan discussed with CTU Intensivist and CT Surgeon - Dr. Earl  - Continue CTU supportive care and ongoing plan of care as per continuing CTU rounds.   - Continue DVT/GI prophylaxis  - Incentive Spirometry 10 times an hour  - Continue to advance physical activity as tolerated and continue PT/OT as directed  1. CAD s/p CABG: Continue ASA, statin, BB, start plavix tomorrow   2. BPH: cont Flomax 0.4 mg oral capsule: 1 cap(s) orally once a day.  3. Hypothyroidism: Cont home dose levothyroxine 88 mcg (0.088 mg) oral tablet: 1 tab(s) orally once a day.      Social Service Disposition:  home

## 2024-08-05 NOTE — PROGRESS NOTE ADULT - SUBJECTIVE AND OBJECTIVE BOX
CTU Attending Progress Daily Note     05 Aug 2024 11:56  POD# - 3  He has history of Hypothyroidism, unspecified type    High cholesterol    HTN (hypertension)    BPH (benign prostatic hyperplasia)    Chronic kidney disease (CKD)      Interval event for past 24 hr:  FLAVIA TOVAR  69y had no event.   Current Complains:  FLAVIA TOVAR has no new complains  HPI:    70 y/o male, current smoker, w/ PMHx of HTN, HLD, hypothyroidism, BPH, CKD (baseline Cr 1.3-1.4), who presented to his cardiologist with c/o SOB on walking uphill and 1-2 flights of stairs, relieved with rest.  Pt also c/o occasional dizziness with postural changes.  He underwent a CCTA on 7/12/24 which revealed a CA score of 722, short segment mixed predominantly dense calcified plaque proximal LAD resulting in moderate to severe narrowing, mixed calcified and noncalcified plaque proximal LCx resulting in moderate to severe narrowing, and mixed predominantly dense calcified plaque proximal and mid RCA resulting in moderate narrowing.  CT FFR 7/25/24 -> LAD 0.59, LCx , OM 0.82, RCA 0.82.   Patient presents today for [facility] with possible intervention if clinically indicated.    Pre cath note:    indication:  [ ] STEMI                [ ] NSTEMI                 [ ] Acute coronary syndrome                     [ ]Unstable Angina   [ ] high risk  [ ] intermediate risk  [ ] low risk                     [ x] Stable Angina     non-invasive testing:    CCTA    Date:    7/12/24    result: [ ] high risk  [x ] intermediate risk  [ ] low risk    Anti- Anginal medications:                    [ ] not used                       [x ] used :   on BBn and Ranexa              [ ] not used but strong indication not to use    Ejection Fraction                   [ ] <29            [ ] 30-39%   [ ] 40-49%     [ ]>50%    CHF                   [ ] active (within last 14 days on meds   [ ] Chronic (on meds but no exacerbation)    COPD                   [ ] mild (on chronic bronchodilators)  [ ] moderate (on chronic steroid therapy)      [ ] severe (indication for home O2 or PACO2 >50)    Other risk factors:                       [ ] Previous MI                     [ ] CVA/ stroke                    [ ] carotid stent/ CEA                    [ ] PVD/PAD- (arterial aneurysm, non-palpable pulses, tortuous vessel with inability to insert catheter, infra-renal dissection, renal or subclavian artery stenosis)                    [ ] diabetic                    [ ] previous CABG                    [x ] Renal Failure       Right Filippo Test: positive    Adjusted Cath Bleeding Risk: 1.5%    Pre-cath Hydration: (x  )  cc IV bolus x 1 over 1 hr followed by ( x ) NS @ 100cc/hr until procedure                                                                                                                             (28 Jul 2024 14:10)    OBJECTIVE:  ICU Vital Signs Last 24 Hrs  T(C): 36.9 (05 Aug 2024 08:00), Max: 37.4 (04 Aug 2024 20:00)  T(F): 98.5 (05 Aug 2024 08:00), Max: 99.3 (04 Aug 2024 20:00)  HR: 77 (05 Aug 2024 11:00) (71 - 90)  BP: 91/56 (05 Aug 2024 11:00) (74/49 - 137/66)  BP(mean): 70 (05 Aug 2024 11:00) (55 - 94)  ABP: 109/58 (04 Aug 2024 12:00) (109/58 - 109/58)  ABP(mean): 75 (04 Aug 2024 12:00) (75 - 75)  RR: 25 (05 Aug 2024 11:00) (19 - 41)  SpO2: 96% (05 Aug 2024 11:00) (94% - 99%)    O2 Parameters below as of 05 Aug 2024 11:00  Patient On (Oxygen Delivery Method): room air          I&O's Summary    04 Aug 2024 07:01  -  05 Aug 2024 07:00  --------------------------------------------------------  IN: 1735 mL / OUT: 3185 mL / NET: -1450 mL    05 Aug 2024 07:01  -  05 Aug 2024 11:56  --------------------------------------------------------  IN: 240 mL / OUT: 750 mL / NET: -510 mL      I&O's Detail    04 Aug 2024 07:01  -  05 Aug 2024 07:00  --------------------------------------------------------  IN:    IV PiggyBack: 100 mL    IV PiggyBack: 300 mL    Oral Fluid: 1335 mL  Total IN: 1735 mL    OUT:    Chest Tube (mL): 40 mL    Chest Tube (mL): 30 mL    Indwelling Catheter - Urethral (mL): 1015 mL    Voided (mL): 2100 mL  Total OUT: 3185 mL    Total NET: -1450 mL      05 Aug 2024 07:01  -  05 Aug 2024 11:56  --------------------------------------------------------  IN:    Oral Fluid: 240 mL  Total IN: 240 mL    OUT:    Voided (mL): 750 mL  Total OUT: 750 mL    Total NET: -510 mL        Adult Advanced Hemodynamics Last 24 Hrs  CVP(mm Hg): --  CVP(cm H2O): --  CO: --  CI: --  PA: --  PA(mean): --  PCWP: --  SVR: --  SVRI: --  PVR: --  PVRI: --    CAPILLARY BLOOD GLUCOSE      POCT Blood Glucose.: 99 mg/dL (05 Aug 2024 11:08)  POCT Blood Glucose.: 105 mg/dL (05 Aug 2024 06:45)  POCT Blood Glucose.: 145 mg/dL (04 Aug 2024 14:59)    LABS:                          9.0    9.55  )-----------( 128      ( 05 Aug 2024 01:52 )             27.3     08-05    140  |  107  |  14  ----------------------------<  100<H>  4.3   |  24  |  0.9    Ca    8.5      05 Aug 2024 01:52  Mg     2.2     08-05    TPro  5.8<L>  /  Alb  3.8  /  TBili  0.6  /  DBili  x   /  AST  34  /  ALT  12  /  AlkPhos  56  08-05    PT/INR - ( 04 Aug 2024 02:10 )   PT: 15.70 sec;   INR: 1.37 ratio         PTT - ( 04 Aug 2024 02:10 )  PTT:32.1 sec  Urinalysis Basic - ( 05 Aug 2024 01:52 )    Color: x / Appearance: x / SG: x / pH: x  Gluc: 100 mg/dL / Ketone: x  / Bili: x / Urobili: x   Blood: x / Protein: x / Nitrite: x   Leuk Esterase: x / RBC: x / WBC x   Sq Epi: x / Non Sq Epi: x / Bacteria: x        Home Medications:  Flomax 0.4 mg oral capsule: 1 cap(s) orally once a day (31 Jul 2024 17:17)  levothyroxine 88 mcg (0.088 mg) oral tablet: 1 tab(s) orally once a day (31 Jul 2024 17:17)  Metoprolol Succinate ER 25 mg oral tablet, extended release: 1 tab(s) orally once a day (31 Jul 2024 17:17)  Ranexa 500 mg oral tablet, extended release: 1 tab(s) orally 2 times a day (31 Jul 2024 17:17)  rosuvastatin 20 mg oral tablet: 1 tab(s) orally once a day (at bedtime) (31 Jul 2024 17:17)    HOSPITAL MEDICATIONS:  MEDICATIONS  (STANDING):  albuterol/ipratropium for Nebulization 3 milliLiter(s) Nebulizer every 6 hours  aspirin 325 milliGRAM(s) Oral daily  atorvastatin 80 milliGRAM(s) Oral at bedtime  bisacodyl Suppository 10 milliGRAM(s) Rectal once  chlorhexidine 2% Cloths 1 Application(s) Topical <User Schedule>  dextrose 5%. 1000 milliLiter(s) (100 mL/Hr) IV Continuous <Continuous>  dextrose 5%. 1000 milliLiter(s) (50 mL/Hr) IV Continuous <Continuous>  dextrose 50% Injectable 50 milliLiter(s) IV Push every 15 minutes  dextrose 50% Injectable 25 milliLiter(s) IV Push every 15 minutes  famotidine    Tablet 20 milliGRAM(s) Oral two times a day  furosemide   Injectable 20 milliGRAM(s) IV Push daily  glucagon  Injectable 1 milliGRAM(s) IntraMuscular once  heparin   Injectable 3000 Unit(s) SubCutaneous every 12 hours  insulin lispro (ADMELOG) corrective regimen sliding scale   SubCutaneous three times a day before meals  levothyroxine 88 MICROGram(s) Oral daily  meperidine     Injectable 25 milliGRAM(s) IV Push once  metoprolol tartrate 12.5 milliGRAM(s) Oral every 12 hours  nitroglycerin  Infusion 15 MICROgram(s)/Min (4.5 mL/Hr) IV Continuous <Continuous>  polyethylene glycol 3350 17 Gram(s) Oral daily  potassium chloride    Tablet ER 20 milliEquivalent(s) Oral daily  senna 2 Tablet(s) Oral at bedtime  sodium chloride 0.9%. 1000 milliLiter(s) (20 mL/Hr) IV Continuous <Continuous>  tamsulosin 0.4 milliGRAM(s) Oral at bedtime    MEDICATIONS  (PRN):  dextrose Oral Gel 15 Gram(s) Oral once PRN Blood Glucose LESS THAN 70 milliGRAM(s)/deciliter  melatonin 5 milliGRAM(s) Oral at bedtime PRN Sleep  ondansetron Injectable 4 milliGRAM(s) IV Push every 6 hours PRN Nausea and/or Vomiting  oxyCODONE    IR 10 milliGRAM(s) Oral every 4 hours PRN Severe Pain (7 - 10)  oxyCODONE    IR 5 milliGRAM(s) Oral every 4 hours PRN Moderate Pain (4 - 6)      REVIEW OF SYSTEMS:  CONSTITUTIONAL: [X] all negative; [ ] weakness, [ ] fevers, [ ] chills  EYES/ENT: [X] all negative; [ ] visual changes, [ ] vertigo, [ ] throat pain   NECK: [X] all negative; [ ] pain, [ ] stiffness  RESPIRATORY: [] all negative, [ ] cough, [ ] wheezing, [ ] hemoptysis, [ ] shortness of breath  CARDIOVASCULAR: [] all negative; [ ] chest pain, [ ] palpitations, [ ] orthopnea  GASTROINTESTINAL: [X] all negative; [ ]abdominal pain, [ ] nausea, [ ] vomiting, [ ] hematemesis, [ ] diarrhea, [ ] constipation, [ ] melena, [ ] hematochezia.  GENITOURINARY: [X] all negative; [ ] dysuria, [ ] frequency, [ ] hematuria  NEUROLOGICAL: [X] all negative; [ ] numbness, [ ] weakness  SKIN: [X] all negative; [ ] itching, [ ] burning, [ ] rashes, [ ] lesions   All other review of systems is negative unless indicated above.    [  ] Unable to assess ROS because     PHYSICAL EXAM:          CONSTITUTIONAL: Well-developed; well-nourished; in no acute distress.   	SKIN: warm, dry  	HEAD: Normocephalic; atraumatic.  	EYES: PERRL, EOM, no conj injection, sclera clear  	ENT: No nasal discharge; airway clear.  	NECK: Supple; non tender.  No midline ttp ctls  	CARD: S1, S2 normal; no murmurs, gallops, or rubs. Regular rate and rhythm. 2+ RPs and DPs bilat, no carotid bruits, no pedal   edema, no calf pain b/l  	RESP: CTA  bilat good air movement No wheezes, rales or rhonchi.  	ABD: Soft, not tender, not distended, no CVA ttp no rebound or guarding, bowel sounds present  	EXT: Normal ROM.  No clubbing, cyanosis or edema.   	  	NEURO: Alert, awake, motor 5/5 R, 5/5 L        RADIOLOGY:  xray  < from: Xray Chest 1 View- PORTABLE-Routine (08.05.24 @ 05:15) >    Impression:    Redemonstration bibasilar opacities and pleural effusions.        --- End of Report ---    < end of copied text >    I spent 45 minutes of critical care time ; more than 50% of visit was spent counseling and/or examining patient, reviewing vitals, labs, medications, imaging and discussing with the team goals of care to prevent life-threatening in this patient who is at high risk for deterioration or death due to:

## 2024-08-06 LAB
ALBUMIN SERPL ELPH-MCNC: 3.8 G/DL — SIGNIFICANT CHANGE UP (ref 3.5–5.2)
ALBUMIN SERPL ELPH-MCNC: 4.1 G/DL — SIGNIFICANT CHANGE UP (ref 3.5–5.2)
ALP SERPL-CCNC: 51 U/L — SIGNIFICANT CHANGE UP (ref 30–115)
ALP SERPL-CCNC: 56 U/L — SIGNIFICANT CHANGE UP (ref 30–115)
ALT FLD-CCNC: 10 U/L — SIGNIFICANT CHANGE UP (ref 0–41)
ALT FLD-CCNC: 10 U/L — SIGNIFICANT CHANGE UP (ref 0–41)
ANION GAP SERPL CALC-SCNC: 12 MMOL/L — SIGNIFICANT CHANGE UP (ref 7–14)
ANION GAP SERPL CALC-SCNC: 8 MMOL/L — SIGNIFICANT CHANGE UP (ref 7–14)
AST SERPL-CCNC: 20 U/L — SIGNIFICANT CHANGE UP (ref 0–41)
AST SERPL-CCNC: 23 U/L — SIGNIFICANT CHANGE UP (ref 0–41)
BASOPHILS # BLD AUTO: 0.07 K/UL — SIGNIFICANT CHANGE UP (ref 0–0.2)
BASOPHILS NFR BLD AUTO: 0.9 % — SIGNIFICANT CHANGE UP (ref 0–1)
BILIRUB SERPL-MCNC: 0.3 MG/DL — SIGNIFICANT CHANGE UP (ref 0.2–1.2)
BILIRUB SERPL-MCNC: 0.3 MG/DL — SIGNIFICANT CHANGE UP (ref 0.2–1.2)
BUN SERPL-MCNC: 14 MG/DL — SIGNIFICANT CHANGE UP (ref 10–20)
BUN SERPL-MCNC: 17 MG/DL — SIGNIFICANT CHANGE UP (ref 10–20)
CALCIUM SERPL-MCNC: 8.5 MG/DL — SIGNIFICANT CHANGE UP (ref 8.4–10.4)
CALCIUM SERPL-MCNC: 8.9 MG/DL — SIGNIFICANT CHANGE UP (ref 8.4–10.5)
CHLORIDE SERPL-SCNC: 102 MMOL/L — SIGNIFICANT CHANGE UP (ref 98–110)
CHLORIDE SERPL-SCNC: 104 MMOL/L — SIGNIFICANT CHANGE UP (ref 98–110)
CO2 SERPL-SCNC: 23 MMOL/L — SIGNIFICANT CHANGE UP (ref 17–32)
CO2 SERPL-SCNC: 25 MMOL/L — SIGNIFICANT CHANGE UP (ref 17–32)
CREAT SERPL-MCNC: 1 MG/DL — SIGNIFICANT CHANGE UP (ref 0.7–1.5)
CREAT SERPL-MCNC: 1.1 MG/DL — SIGNIFICANT CHANGE UP (ref 0.7–1.5)
EGFR: 74 ML/MIN/1.73M2 — SIGNIFICANT CHANGE UP
EGFR: 82 ML/MIN/1.73M2 — SIGNIFICANT CHANGE UP
EOSINOPHIL # BLD AUTO: 0.26 K/UL — SIGNIFICANT CHANGE UP (ref 0–0.7)
EOSINOPHIL NFR BLD AUTO: 3.4 % — SIGNIFICANT CHANGE UP (ref 0–8)
GLUCOSE BLDC GLUCOMTR-MCNC: 107 MG/DL — HIGH (ref 70–99)
GLUCOSE SERPL-MCNC: 132 MG/DL — HIGH (ref 70–99)
GLUCOSE SERPL-MCNC: 94 MG/DL — SIGNIFICANT CHANGE UP (ref 70–99)
HCT VFR BLD CALC: 28.8 % — LOW (ref 42–52)
HGB BLD-MCNC: 9.4 G/DL — LOW (ref 14–18)
IMM GRANULOCYTES NFR BLD AUTO: 0.4 % — HIGH (ref 0.1–0.3)
LYMPHOCYTES # BLD AUTO: 1.32 K/UL — SIGNIFICANT CHANGE UP (ref 1.2–3.4)
LYMPHOCYTES # BLD AUTO: 17.5 % — LOW (ref 20.5–51.1)
MAGNESIUM SERPL-MCNC: 2 MG/DL — SIGNIFICANT CHANGE UP (ref 1.8–2.4)
MAGNESIUM SERPL-MCNC: 2 MG/DL — SIGNIFICANT CHANGE UP (ref 1.8–2.4)
MCHC RBC-ENTMCNC: 30 PG — SIGNIFICANT CHANGE UP (ref 27–31)
MCHC RBC-ENTMCNC: 32.6 G/DL — SIGNIFICANT CHANGE UP (ref 32–37)
MCV RBC AUTO: 92 FL — SIGNIFICANT CHANGE UP (ref 80–94)
MONOCYTES # BLD AUTO: 0.57 K/UL — SIGNIFICANT CHANGE UP (ref 0.1–0.6)
MONOCYTES NFR BLD AUTO: 7.6 % — SIGNIFICANT CHANGE UP (ref 1.7–9.3)
NEUTROPHILS # BLD AUTO: 5.29 K/UL — SIGNIFICANT CHANGE UP (ref 1.4–6.5)
NEUTROPHILS NFR BLD AUTO: 70.2 % — SIGNIFICANT CHANGE UP (ref 42.2–75.2)
NRBC # BLD: 0 /100 WBCS — SIGNIFICANT CHANGE UP (ref 0–0)
PLATELET # BLD AUTO: 156 K/UL — SIGNIFICANT CHANGE UP (ref 130–400)
PMV BLD: 10.4 FL — SIGNIFICANT CHANGE UP (ref 7.4–10.4)
POTASSIUM SERPL-MCNC: 4.1 MMOL/L — SIGNIFICANT CHANGE UP (ref 3.5–5)
POTASSIUM SERPL-MCNC: 4.1 MMOL/L — SIGNIFICANT CHANGE UP (ref 3.5–5)
POTASSIUM SERPL-SCNC: 4.1 MMOL/L — SIGNIFICANT CHANGE UP (ref 3.5–5)
POTASSIUM SERPL-SCNC: 4.1 MMOL/L — SIGNIFICANT CHANGE UP (ref 3.5–5)
PROT SERPL-MCNC: 5.5 G/DL — LOW (ref 6–8)
PROT SERPL-MCNC: 6.6 G/DL — SIGNIFICANT CHANGE UP (ref 6–8)
RBC # BLD: 3.13 M/UL — LOW (ref 4.7–6.1)
RBC # FLD: 14.6 % — HIGH (ref 11.5–14.5)
SODIUM SERPL-SCNC: 135 MMOL/L — SIGNIFICANT CHANGE UP (ref 135–146)
SODIUM SERPL-SCNC: 139 MMOL/L — SIGNIFICANT CHANGE UP (ref 135–146)
WBC # BLD: 7.54 K/UL — SIGNIFICANT CHANGE UP (ref 4.8–10.8)
WBC # FLD AUTO: 7.54 K/UL — SIGNIFICANT CHANGE UP (ref 4.8–10.8)

## 2024-08-06 PROCEDURE — 71045 X-RAY EXAM CHEST 1 VIEW: CPT | Mod: 26

## 2024-08-06 PROCEDURE — 93010 ELECTROCARDIOGRAM REPORT: CPT

## 2024-08-06 PROCEDURE — 99232 SBSQ HOSP IP/OBS MODERATE 35: CPT

## 2024-08-06 RX ORDER — METOPROLOL TARTRATE 100 MG
12.5 TABLET ORAL DAILY
Refills: 0 | Status: DISCONTINUED | OUTPATIENT
Start: 2024-08-06 | End: 2024-08-07

## 2024-08-06 RX ORDER — KETOROLAC TROMETHAMINE 10 MG
15 TABLET ORAL ONCE
Refills: 0 | Status: DISCONTINUED | OUTPATIENT
Start: 2024-08-06 | End: 2024-08-06

## 2024-08-06 RX ORDER — POTASSIUM CHLORIDE 1500 MG/1
20 TABLET, EXTENDED RELEASE ORAL ONCE
Refills: 0 | Status: COMPLETED | OUTPATIENT
Start: 2024-08-06 | End: 2024-08-06

## 2024-08-06 RX ADMIN — POTASSIUM CHLORIDE 20 MILLIEQUIVALENT(S): 1500 TABLET, EXTENDED RELEASE ORAL at 18:24

## 2024-08-06 RX ADMIN — Medication 325 MILLIGRAM(S): at 11:09

## 2024-08-06 RX ADMIN — CHLORHEXIDINE GLUCONATE 1 APPLICATION(S): 500 CLOTH TOPICAL at 05:51

## 2024-08-06 RX ADMIN — IPRATROPIUM BROMIDE AND ALBUTEROL SULFATE 3 MILLILITER(S): 2.5; .5 SOLUTION RESPIRATORY (INHALATION) at 08:22

## 2024-08-06 RX ADMIN — Medication 15 MILLIGRAM(S): at 07:00

## 2024-08-06 RX ADMIN — HEPARIN SODIUM 3000 UNIT(S): 1000 INJECTION, SOLUTION INTRAVENOUS; SUBCUTANEOUS at 17:00

## 2024-08-06 RX ADMIN — Medication 88 MICROGRAM(S): at 05:48

## 2024-08-06 RX ADMIN — ATORVASTATIN CALCIUM 80 MILLIGRAM(S): 40 TABLET, FILM COATED ORAL at 21:09

## 2024-08-06 RX ADMIN — Medication 17 GRAM(S): at 11:09

## 2024-08-06 RX ADMIN — HEPARIN SODIUM 3000 UNIT(S): 1000 INJECTION, SOLUTION INTRAVENOUS; SUBCUTANEOUS at 05:51

## 2024-08-06 RX ADMIN — FAMOTIDINE 20 MILLIGRAM(S): 40 TABLET, FILM COATED ORAL at 05:50

## 2024-08-06 RX ADMIN — Medication 15 MILLIGRAM(S): at 06:03

## 2024-08-06 RX ADMIN — FAMOTIDINE 20 MILLIGRAM(S): 40 TABLET, FILM COATED ORAL at 17:01

## 2024-08-06 RX ADMIN — Medication 0.4 MILLIGRAM(S): at 21:08

## 2024-08-06 RX ADMIN — POTASSIUM CHLORIDE 20 MILLIEQUIVALENT(S): 1500 TABLET, EXTENDED RELEASE ORAL at 11:10

## 2024-08-06 NOTE — PROGRESS NOTE ADULT - SUBJECTIVE AND OBJECTIVE BOX
CTU Attending Progress Daily Note     06 Aug 2024 06:51    Procedure:                                                  POD#                   Patient seen as post-op critical care follow-up    HPI:    70 y/o male, current smoker, w/ PMHx of HTN, HLD, hypothyroidism, BPH, CKD (baseline Cr 1.3-1.4), who presented to his cardiologist with c/o SOB on walking uphill and 1-2 flights of stairs, relieved with rest.  Pt also c/o occasional dizziness with postural changes.  He underwent a CCTA on 7/12/24 which revealed a CA score of 722, short segment mixed predominantly dense calcified plaque proximal LAD resulting in moderate to severe narrowing, mixed calcified and noncalcified plaque proximal LCx resulting in moderate to severe narrowing, and mixed predominantly dense calcified plaque proximal and mid RCA resulting in moderate narrowing.  CT FFR 7/25/24 -> LAD 0.59, LCx , OM 0.82, RCA 0.82.   Patient presents today for [facility] with possible intervention if clinically indicated.    Pre cath note:    indication:  [ ] STEMI                [ ] NSTEMI                 [ ] Acute coronary syndrome                     [ ]Unstable Angina   [ ] high risk  [ ] intermediate risk  [ ] low risk                     [ x] Stable Angina     non-invasive testing:    CCTA    Date:    7/12/24    result: [ ] high risk  [x ] intermediate risk  [ ] low risk    Anti- Anginal medications:                    [ ] not used                       [x ] used :   on BBn and Ranexa              [ ] not used but strong indication not to use    Ejection Fraction                   [ ] <29            [ ] 30-39%   [ ] 40-49%     [ ]>50%    CHF                   [ ] active (within last 14 days on meds   [ ] Chronic (on meds but no exacerbation)    COPD                   [ ] mild (on chronic bronchodilators)  [ ] moderate (on chronic steroid therapy)      [ ] severe (indication for home O2 or PACO2 >50)    Other risk factors:                       [ ] Previous MI                     [ ] CVA/ stroke                    [ ] carotid stent/ CEA                    [ ] PVD/PAD- (arterial aneurysm, non-palpable pulses, tortuous vessel with inability to insert catheter, infra-renal dissection, renal or subclavian artery stenosis)                    [ ] diabetic                    [ ] previous CABG                    [x ] Renal Failure       Right Filippo Test: positive    Adjusted Cath Bleeding Risk: 1.5%    Pre-cath Hydration: (x  )  cc IV bolus x 1 over 1 hr followed by ( x ) NS @ 100cc/hr until procedure                                                                                                                             (28 Jul 2024 14:10)    See preop testing chart H&P    Interval event for past 24 hr:  FLAVIA TOVAR  69y had no event.     Current Complains:  FLAVIA TOVAR has no new complaints    REVIEW OF SYSTEMS:  CONSTITUTIONAL:  [-] weakness, [-] fevers, [-] chills  EYES/ENT: [-] visual changes, [-] vertigo, [-] throat pain   NECK: [-] pain, [-] stiffness  RESPIRATORY: [-] cough, [-] wheezing, [-] hemoptysis, [-] shortness of breath  CARDIOVASCULAR: [-] chest pain, [-] palpitations, [-] orthopnea  GASTROINTESTINAL:    [-]abdominal pain, [-] nausea, [-] vomiting, [-] hematemesis, [-] diarrhea, [-] constipation, [-] melena, [-] hematochezia.  GENITOURINARY: [-] dysuria, [-] frequency, [-] hematuria  NEUROLOGICAL: [-] numbness, [-] weakness  SKIN: [-] itching, [-] burning, [-] rashes, [-] lesions   All other review of systems is negative unless indicated above.    [  ] Unable to assess ROS because :    OBJECTIVE:  ICU Vital Signs Last 24 Hrs  T(C): 37.1 (05 Aug 2024 16:00), Max: 37.1 (05 Aug 2024 16:00)  T(F): 98.8 (05 Aug 2024 16:00), Max: 98.8 (05 Aug 2024 16:00)  HR: 78 (06 Aug 2024 05:00) (71 - 84)  BP: 110/67 (06 Aug 2024 05:00) (74/49 - 137/75)  BP(mean): 83 (06 Aug 2024 05:00) (55 - 95)  ABP: --  ABP(mean): --  RR: 21 (06 Aug 2024 05:00) (19 - 34)  SpO2: 94% (06 Aug 2024 05:00) (93% - 97%)    O2 Parameters below as of 06 Aug 2024 05:00  Patient On (Oxygen Delivery Method): room air            I&O's Summary    04 Aug 2024 07:01  -  05 Aug 2024 07:00  --------------------------------------------------------  IN: 1735 mL / OUT: 3185 mL / NET: -1450 mL    05 Aug 2024 07:01  -  06 Aug 2024 06:51  --------------------------------------------------------  IN: 800 mL / OUT: 2200 mL / NET: -1400 mL      Adult Advanced Hemodynamics Last 24 Hrs  CVP(mm Hg): --  CVP(cm H2O): --  CO: --  CI: --  PA: --  PA(mean): --  PCWP: --  SVR: --  SVRI: --  PVR: --  PVRI: --      PHYSICAL EXAM:  General: WN/WD NAD    HEENT:     [+] NCAT  [+] EOMI  [-] Conjuctival edema   [-] Icterus   [-] Thrush   [-] ETT  [-] NGT/OGT    Neck:         [+] FROM   [-] JVD     [-] Nodes     [-] Masses    [+] Mid-line trachea    [-] Tracheostomy    Chest:         [-] Sternal click   [-] Sternal drainage   [+] Pacing wires   [+] Chest tubes   [-] SubQ emphysema    Lungs:          [+] CTA   [-] Rhonchi   [-] Rales    [-] Wheezing    [-] Decreased BS    [-] Dullness R L    Cardiac:       [+] S1 [+] S2    [+] RRR   [-] Irregular   [-] S3   [-] S4    [-] Murmurs    [-] Rub    Abdomen:    [+] BS    [+] Soft    [+] Non-tender     [-] Distended    [-] Organomegaly  [-] PEG    Extremities:   [-] Cyanosis U/L   [-] Clubbing  U/L  [-] LE/UE Edema   [+] Capillary refill    [+] Pulses     Neuro:        [+] Awake   [+]  Alert   [-] Confused   [-] Lethargic   [-] Sedated   [-] Generalized Weakness    Skin:        [-] Rashes    [-] Erythema   [+] Normal incisions   [+] IV sites intact   [-] Sacral decubitus    Tubes:  LINES:    CAPILLARY BLOOD GLUCOSE      POCT Blood Glucose.: 111 mg/dL (05 Aug 2024 22:57)    CAPILLARY BLOOD GLUCOSE      POCT Blood Glucose.: 111 mg/dL (05 Aug 2024 22:57)  POCT Blood Glucose.: 101 mg/dL (05 Aug 2024 16:00)  POCT Blood Glucose.: 99 mg/dL (05 Aug 2024 11:08)      HOSPITAL MEDICATIONS:  MEDICATIONS  (STANDING):  albuterol/ipratropium for Nebulization 3 milliLiter(s) Nebulizer every 6 hours  aspirin 325 milliGRAM(s) Oral daily  atorvastatin 80 milliGRAM(s) Oral at bedtime  bisacodyl Suppository 10 milliGRAM(s) Rectal once  chlorhexidine 2% Cloths 1 Application(s) Topical <User Schedule>  dextrose 5%. 1000 milliLiter(s) (50 mL/Hr) IV Continuous <Continuous>  dextrose 5%. 1000 milliLiter(s) (100 mL/Hr) IV Continuous <Continuous>  dextrose 50% Injectable 50 milliLiter(s) IV Push every 15 minutes  dextrose 50% Injectable 25 milliLiter(s) IV Push every 15 minutes  famotidine    Tablet 20 milliGRAM(s) Oral two times a day  glucagon  Injectable 1 milliGRAM(s) IntraMuscular once  heparin   Injectable 3000 Unit(s) SubCutaneous every 12 hours  insulin lispro (ADMELOG) corrective regimen sliding scale   SubCutaneous three times a day before meals  ketorolac   Injectable 15 milliGRAM(s) IV Push once  levothyroxine 88 MICROGram(s) Oral daily  meperidine     Injectable 25 milliGRAM(s) IV Push once  metoprolol tartrate 12.5 milliGRAM(s) Oral daily  nitroglycerin  Infusion 15 MICROgram(s)/Min (4.5 mL/Hr) IV Continuous <Continuous>  polyethylene glycol 3350 17 Gram(s) Oral daily  potassium chloride    Tablet ER 20 milliEquivalent(s) Oral daily  senna 2 Tablet(s) Oral at bedtime  sodium chloride 0.9%. 1000 milliLiter(s) (20 mL/Hr) IV Continuous <Continuous>  tamsulosin 0.4 milliGRAM(s) Oral at bedtime    MEDICATIONS  (PRN):  dextrose Oral Gel 15 Gram(s) Oral once PRN Blood Glucose LESS THAN 70 milliGRAM(s)/deciliter  melatonin 5 milliGRAM(s) Oral at bedtime PRN Sleep  ondansetron Injectable 4 milliGRAM(s) IV Push every 6 hours PRN Nausea and/or Vomiting  oxyCODONE    IR 10 milliGRAM(s) Oral every 4 hours PRN Severe Pain (7 - 10)  oxyCODONE    IR 5 milliGRAM(s) Oral every 4 hours PRN Moderate Pain (4 - 6)      LABS:                          9.4    7.54  )-----------( 156      ( 06 Aug 2024 02:05 )             28.8     08-06    139  |  104  |  14  ----------------------------<  94  4.1   |  23  |  1.0    Ca    8.5      06 Aug 2024 02:05  Mg     2.0     08-06    TPro  5.5<L>  /  Alb  3.8  /  TBili  0.3  /  DBili  x   /  AST  23  /  ALT  10  /  AlkPhos  51  08-06      Urinalysis Basic - ( 06 Aug 2024 02:05 )    Color: x / Appearance: x / SG: x / pH: x  Gluc: 94 mg/dL / Ketone: x  / Bili: x / Urobili: x   Blood: x / Protein: x / Nitrite: x   Leuk Esterase: x / RBC: x / WBC x   Sq Epi: x / Non Sq Epi: x / Bacteria: x          RADIOLOGY:  Reviewed and interpreted by me  CXR from 08-06-24 shows [+] mild congestion, [-] pneumothorax, [-] R/L effusion, [-] cardiomegaly,   NGT in place, S-G Catheter in place, R/L TLC in place, R/L Chest Tubes in place    ECG:  Reviewed and interpreted by me:   QTC:    Assessment:      PAST MEDICAL & SURGICAL HISTORY:  Hypothyroidism, unspecified type      High cholesterol      HTN (hypertension)      BPH (benign prostatic hyperplasia)      Chronic kidney disease (CKD)      No significant past surgical history          PLAN:  Neuro: Pain control  Pulm: Encourage coughing, deep breathing and use of incentive spirometry. Wean off supplemental oxygen as able. Daily CXR.   Cardio: Monitor telemetry/alarms. Continue cardiac meds  GI: Tolerating diet. Continue stool softeners. Continue GI prophylaxis  Renal: monitor urine output, supplement electrolytes as needed  Vasc: Heparin SC/SCDs for DVT prophylaxis  Heme: Monitor H/H.   ID: Off antibiotics. Stable.  Endocrine: Monitor finger stick blood sugar and control hyperglycemia with insulin  Physical Therapy: OOB/ambulate  Tubes: Monitor Chest tube output      Discussed with Cardiothoracic Team at AM rounds.    45 minutes of critical care time spent providing medical care for patient's acute illness/conditions that impairs at least one vital organ system and/or poses a high risk of imminent or life threatening deterioration in the patient's condition. It includes time spent evaluating and treating the patient's acute illness as well as time spent reviewing labs, radiology, discussing goals of care with patient and/or patient's family, and discussing the case with a multidisciplinary team in an effort to prevent further life threatening deterioration or end organ damage. This time is independent of any procedures performed. CTU Attending Progress Daily Note     06 Aug 2024 06:51    Procedure:       CABG 4 LIMA                                           POD#   4                Patient seen as post-op critical care follow-up    HPI:    68 y/o male, current smoker, w/ PMHx of HTN, HLD, hypothyroidism, BPH, CKD (baseline Cr 1.3-1.4), who presented to his cardiologist with c/o SOB on walking uphill and 1-2 flights of stairs, relieved with rest.  Pt also c/o occasional dizziness with postural changes.  He underwent a CCTA on 7/12/24 which revealed a CA score of 722, short segment mixed predominantly dense calcified plaque proximal LAD resulting in moderate to severe narrowing, mixed calcified and noncalcified plaque proximal LCx resulting in moderate to severe narrowing, and mixed predominantly dense calcified plaque proximal and mid RCA resulting in moderate narrowing.  CT FFR 7/25/24 -> LAD 0.59, LCx , OM 0.82, RCA 0.82.   Patient presents today for [facility] with possible intervention if clinically indicated.                                                                                                                               (28 Jul 2024 14:10)      Interval event for past 24 hr:  FLAVIA TOVAR  69y had no event.     Current Complains:  FLAVIA TOVAR has no new complaints    REVIEW OF SYSTEMS:  CONSTITUTIONAL:  [-] weakness, [-] fevers, [-] chills  EYES/ENT: [-] visual changes, [+] dizziness on ambulation, [-] throat pain  NECK: [-] pain, [-] stiffness  RESPIRATORY: [-] cough, [-] wheezing, [-] hemoptysis, [-] shortness of breath  CARDIOVASCULAR: [-] chest pain, [-] palpitations, [-] orthopnea  GASTROINTESTINAL:    [-]abdominal pain, [-] nausea, [-] vomiting, [-] hematemesis, [-] diarrhea, [-] constipation, [-] melena, [-] hematochezia.  GENITOURINARY: [-] dysuria, [-] frequency, [-] hematuria  NEUROLOGICAL: [-] numbness, [-] weakness  SKIN: [-] itching, [-] burning, [-] rashes, [-] lesions   All other review of systems is negative unless indicated above.    [  ] Unable to assess ROS because :    OBJECTIVE:  ICU Vital Signs Last 24 Hrs  T(C): 37.1 (05 Aug 2024 16:00), Max: 37.1 (05 Aug 2024 16:00)  T(F): 98.8 (05 Aug 2024 16:00), Max: 98.8 (05 Aug 2024 16:00)  HR: 78 (06 Aug 2024 05:00) (71 - 84)  BP: 110/67 (06 Aug 2024 05:00) (74/49 - 137/75)  BP(mean): 83 (06 Aug 2024 05:00) (55 - 95)  ABP: --  ABP(mean): --  RR: 21 (06 Aug 2024 05:00) (19 - 34)  SpO2: 94% (06 Aug 2024 05:00) (93% - 97%)    O2 Parameters below as of 06 Aug 2024 05:00  Patient On (Oxygen Delivery Method): room air            I&O's Summary    04 Aug 2024 07:01  -  05 Aug 2024 07:00  --------------------------------------------------------  IN: 1735 mL / OUT: 3185 mL / NET: -1450 mL    05 Aug 2024 07:01  -  06 Aug 2024 06:51  --------------------------------------------------------  IN: 800 mL / OUT: 2200 mL / NET: -1400 mL      PHYSICAL EXAM:  General: WN/WD NAD    HEENT:     [+] NCAT  [+] EOMI  [-] Conjuctival edema   [-] Icterus   [-] Thrush   [-] ETT  [-] NGT/OGT    Neck:         [+] FROM   [-] JVD     [-] Nodes     [-] Masses    [+] Mid-line trachea    [-] Tracheostomy    Chest:         [-] Sternal click   [-] Sternal drainage   [+] Pacing wires   [-] Chest tubes   [-] SubQ emphysema    Lungs:          [+] CTA   [-] Rhonchi   [-] Rales    [-] Wheezing    [-] Decreased BS    [-] Dullness R L    Cardiac:       [+] S1 [+] S2    [+] RRR   [-] Irregular   [-] S3   [-] S4    [-] Murmurs    [-] Rub    Abdomen:    [+] BS    [+] Soft    [+] Non-tender     [-] Distended    [-] Organomegaly  [-] PEG    Extremities:   [-] Cyanosis U/L   [-] Clubbing  U/L  [-] LE/UE Edema   [+] Capillary refill    [+] Pulses     Neuro:        [+] Awake   [+]  Alert   [-] Confused   [-] Lethargic   [-] Sedated       Skin:        [-] Rashes    [-] Erythema   [+] Normal incisions   [+] IV sites intact         CAPILLARY BLOOD GLUCOSE      POCT Blood Glucose.: 111 mg/dL (05 Aug 2024 22:57)  POCT Blood Glucose.: 101 mg/dL (05 Aug 2024 16:00)  POCT Blood Glucose.: 99 mg/dL (05 Aug 2024 11:08)      HOSPITAL MEDICATIONS:  MEDICATIONS  (STANDING):  albuterol/ipratropium for Nebulization 3 milliLiter(s) Nebulizer every 6 hours  aspirin 325 milliGRAM(s) Oral daily  atorvastatin 80 milliGRAM(s) Oral at bedtime  bisacodyl Suppository 10 milliGRAM(s) Rectal once  chlorhexidine 2% Cloths 1 Application(s) Topical <User Schedule>  dextrose 5%. 1000 milliLiter(s) (50 mL/Hr) IV Continuous <Continuous>  dextrose 5%. 1000 milliLiter(s) (100 mL/Hr) IV Continuous <Continuous>  dextrose 50% Injectable 50 milliLiter(s) IV Push every 15 minutes  dextrose 50% Injectable 25 milliLiter(s) IV Push every 15 minutes  famotidine    Tablet 20 milliGRAM(s) Oral two times a day  glucagon  Injectable 1 milliGRAM(s) IntraMuscular once  heparin   Injectable 3000 Unit(s) SubCutaneous every 12 hours  insulin lispro (ADMELOG) corrective regimen sliding scale   SubCutaneous three times a day before meals  ketorolac   Injectable 15 milliGRAM(s) IV Push once  levothyroxine 88 MICROGram(s) Oral daily  meperidine     Injectable 25 milliGRAM(s) IV Push once  metoprolol tartrate 12.5 milliGRAM(s) Oral daily  nitroglycerin  Infusion 15 MICROgram(s)/Min (4.5 mL/Hr) IV Continuous <Continuous>  polyethylene glycol 3350 17 Gram(s) Oral daily  potassium chloride    Tablet ER 20 milliEquivalent(s) Oral daily  senna 2 Tablet(s) Oral at bedtime  sodium chloride 0.9%. 1000 milliLiter(s) (20 mL/Hr) IV Continuous <Continuous>  tamsulosin 0.4 milliGRAM(s) Oral at bedtime    MEDICATIONS  (PRN):  dextrose Oral Gel 15 Gram(s) Oral once PRN Blood Glucose LESS THAN 70 milliGRAM(s)/deciliter  melatonin 5 milliGRAM(s) Oral at bedtime PRN Sleep  ondansetron Injectable 4 milliGRAM(s) IV Push every 6 hours PRN Nausea and/or Vomiting  oxyCODONE    IR 10 milliGRAM(s) Oral every 4 hours PRN Severe Pain (7 - 10)  oxyCODONE    IR 5 milliGRAM(s) Oral every 4 hours PRN Moderate Pain (4 - 6)      LABS:                          9.4    7.54  )-----------( 156      ( 06 Aug 2024 02:05 )             28.8     08-06    139  |  104  |  14  ----------------------------<  94  4.1   |  23  |  1.0    Ca    8.5      06 Aug 2024 02:05  Mg     2.0     08-06    TPro  5.5<L>  /  Alb  3.8  /  TBili  0.3  /  DBili  x   /  AST  23  /  ALT  10  /  AlkPhos  51  08-06      Urinalysis Basic - ( 06 Aug 2024 02:05 )    Color: x / Appearance: x / SG: x / pH: x  Gluc: 94 mg/dL / Ketone: x  / Bili: x / Urobili: x   Blood: x / Protein: x / Nitrite: x   Leuk Esterase: x / RBC: x / WBC x   Sq Epi: x / Non Sq Epi: x / Bacteria: x          RADIOLOGY:    < from: Xray Chest 1 View- PORTABLE-Routine (08.05.24 @ 05:15) >  Impression:    Redemonstration bibasilar opacities and pleural effusions.    < end of copied text >      Assessment:  CAD SP CABG  Acute blood loss anemia    PAST MEDICAL & SURGICAL HISTORY:  Hypothyroidism, unspecified type      High cholesterol      HTN (hypertension)      BPH (benign prostatic hyperplasia)      Chronic kidney disease (CKD)      No significant past surgical history          PLAN:  Neuro: Pain control  Pulm: Encourage coughing, deep breathing and use of incentive spirometry. Wean off supplemental oxygen as able. Daily CXR.   Cardio: Monitor telemetry/alarms. Continue cardiac meds  GI: Tolerating diet. Continue stool softeners. Continue GI prophylaxis  Renal: monitor urine output, supplement electrolytes as needed  Vasc: Heparin SC/SCDs for DVT prophylaxis  Heme: Monitor H/H.   ID: Off antibiotics. Stable.  Endocrine: Monitor finger stick blood sugar and control hyperglycemia with insulin  Physical Therapy: OOB/ambulate        Discussed with Cardiothoracic Team at AM rounds.

## 2024-08-06 NOTE — PROGRESS NOTE ADULT - SUBJECTIVE AND OBJECTIVE BOX
OPERATIVE PROCEDURE(s):                POD #     4                  69yMale  SURGEON(s): NATI Earl  SUBJECTIVE ASSESSMENT:   Vital Signs Last 24 Hrs  T(F): 98.4 (06 Aug 2024 08:00), Max: 99.3 (05 Aug 2024 20:00)  HR: 85 (06 Aug 2024 08:00) (71 - 85)  BP: 89/50 (06 Aug 2024 08:00) (74/49 - 137/75)  BP(mean): 62 (06 Aug 2024 08:00) (55 - 95)  ABP: --  ABP(mean): --  RR: 26 (06 Aug 2024 08:00) (19 - 34)  SpO2: 93% (06 Aug 2024 08:00) (91% - 97%)  CVP(mm Hg): --  CVP(cm H2O): --  CO: --  CI: --  PA: --  SVR: --    I&O's Detail    05 Aug 2024 07:01  -  06 Aug 2024 07:00  --------------------------------------------------------  IN:    Oral Fluid: 800 mL  Total IN: 800 mL    OUT:    Voided (mL): 2400 mL  Total OUT: 2400 mL        Net:   I&O's Detail    04 Aug 2024 07:01  -  05 Aug 2024 07:00  --------------------------------------------------------  Total NET: -1450 mL      05 Aug 2024 07:01  -  06 Aug 2024 07:00  --------------------------------------------------------  Total NET: -1600 mL        CAPILLARY BLOOD GLUCOSE      POCT Blood Glucose.: 107 mg/dL (06 Aug 2024 06:52)  POCT Blood Glucose.: 111 mg/dL (05 Aug 2024 22:57)  POCT Blood Glucose.: 101 mg/dL (05 Aug 2024 16:00)  POCT Blood Glucose.: 99 mg/dL (05 Aug 2024 11:08)    Physical Exam:  General: NAD; A&Ox3/Patient is intubated and sedated  Cardiac: S1/S2, RRR, no murmur, no rubs  Lungs: unlabored respirations, CTA b/l, no wheeze, no rales, no crackles  Abdomen: Soft/NT/ND; positive bowel sounds x 4  Sternum: Intact, no click, incision healing well with no drainage  Incisions: Incisions clean/dry/intact  Extremities: No edema b/l lower extremities; good capillary refill; no cyanosis; palpable 1+ pedal pulses b/l    Central Venous Catheter: Yes[]  No[] , If Yes indication:           Day #  [place] Catheter: Yes  [] , No  [] , If yes indication:                      Day #  NGT: Yes [] No [] ,    If Yes Placement:                                     Day #  EPICARDIAL WIRES:  [] YES [] NO                                              Day #  BOWEL MOVEMENT:  [] YES [] NO, If No, Timing since last BM:      Day #  CHEST TUBE(Left/Right):  [] YES [] NO, If yes -  AIR LEAKS:  [] YES [] NO        LABS:                        9.4<L>  7.54  )-----------( 156      ( 06 Aug 2024 02:05 )             28.8<L>                        9.0<L>  9.55  )-----------( 128<L>    ( 05 Aug 2024 01:52 )             27.3<L>    08-06    139  |  104  |  14  ----------------------------<  94  4.1   |  23  |  1.0  08-05    140  |  105  |  16  ----------------------------<  96  4.5   |  26  |  0.9    Ca    8.5      06 Aug 2024 02:05  Mg     2.0     08-06    TPro  5.5<L> [6.0 - 8.0]  /  Alb  3.8 [3.5 - 5.2]  /  TBili  0.3 [0.2 - 1.2]  /  DBili  x   /  AST  23 [0 - 41]  /  ALT  10 [0 - 41]  /  AlkPhos  51 [30 - 115]  08-06      Urinalysis Basic - ( 06 Aug 2024 02:05 )    Color: x / Appearance: x / SG: x / pH: x  Gluc: 94 mg/dL / Ketone: x  / Bili: x / Urobili: x   Blood: x / Protein: x / Nitrite: x   Leuk Esterase: x / RBC: x / WBC x   Sq Epi: x / Non Sq Epi: x / Bacteria: x        RADIOLOGY & ADDITIONAL TESTS:  CXR:  EKG:  MEDICATIONS  (STANDING):  albuterol/ipratropium for Nebulization 3 milliLiter(s) Nebulizer every 6 hours  aspirin 325 milliGRAM(s) Oral daily  atorvastatin 80 milliGRAM(s) Oral at bedtime  bisacodyl Suppository 10 milliGRAM(s) Rectal once  chlorhexidine 2% Cloths 1 Application(s) Topical <User Schedule>  dextrose 5%. 1000 milliLiter(s) (50 mL/Hr) IV Continuous <Continuous>  dextrose 5%. 1000 milliLiter(s) (100 mL/Hr) IV Continuous <Continuous>  dextrose 50% Injectable 50 milliLiter(s) IV Push every 15 minutes  dextrose 50% Injectable 25 milliLiter(s) IV Push every 15 minutes  famotidine    Tablet 20 milliGRAM(s) Oral two times a day  glucagon  Injectable 1 milliGRAM(s) IntraMuscular once  heparin   Injectable 3000 Unit(s) SubCutaneous every 12 hours  insulin lispro (ADMELOG) corrective regimen sliding scale   SubCutaneous three times a day before meals  levothyroxine 88 MICROGram(s) Oral daily  meperidine     Injectable 25 milliGRAM(s) IV Push once  metoprolol tartrate 12.5 milliGRAM(s) Oral daily  nitroglycerin  Infusion 15 MICROgram(s)/Min (4.5 mL/Hr) IV Continuous <Continuous>  polyethylene glycol 3350 17 Gram(s) Oral daily  potassium chloride    Tablet ER 20 milliEquivalent(s) Oral daily  senna 2 Tablet(s) Oral at bedtime  sodium chloride 0.9%. 1000 milliLiter(s) (20 mL/Hr) IV Continuous <Continuous>  tamsulosin 0.4 milliGRAM(s) Oral at bedtime    MEDICATIONS  (PRN):  dextrose Oral Gel 15 Gram(s) Oral once PRN Blood Glucose LESS THAN 70 milliGRAM(s)/deciliter  melatonin 5 milliGRAM(s) Oral at bedtime PRN Sleep  ondansetron Injectable 4 milliGRAM(s) IV Push every 6 hours PRN Nausea and/or Vomiting  oxyCODONE    IR 10 milliGRAM(s) Oral every 4 hours PRN Severe Pain (7 - 10)  oxyCODONE    IR 5 milliGRAM(s) Oral every 4 hours PRN Moderate Pain (4 - 6)    HEPARIN:  [] YES [] NO  Dose: XX UNITS/HR UNITS Q8H  LOVENOX:[] YES [] NO  Dose: XX mg Q24H  COUMADIN: []  YES [] NO  Dose: XX mg  Q24H  SCD's: YES b/l  GI Prophylaxis: Protonix [], Pepcid [], None [], (Contra-indication:.....)    Post-Op Beta-Blockers: Yes [], No[], If No, then contraindication:  Post-Op Aspirin: Yes [],  No [], If No, then contraindication:  Post-Op Statin: Yes [], No[], If No, then contraindication:  Allergies    No Known Allergies    Intolerances      Ambulation/Activity Status:    Assessment/Plan:  69y Male status-post .....  - Case and plan discussed with CTU Intensivist and CT Surgeon - Dr. Merritt/Boyd/Rajat  - Continue CTU supportive care    - Continue DVT/GI prophylaxis  - Incentive Spirometry 10 times an hour  - Continue to advance physical activity as tolerated and continue PT/OT as directed  1. CAD: Continue ASA, statin, BB  2. HTN:   3. A. Fib:   4. COPD/Hypoxia:   5. DM/Glucose Control:     Social Service Disposition:     OPERATIVE PROCEDURE(s):     CABGx4           POD #     4                  69yMale  SURGEON(s): NATI Earl  SUBJECTIVE ASSESSMENT: pt seen and examined. no acute complaints at this time.   Vital Signs Last 24 Hrs  T(F): 98.4 (06 Aug 2024 08:00), Max: 99.3 (05 Aug 2024 20:00)  HR: 85 (06 Aug 2024 08:00) (71 - 85)  BP: 89/50 (06 Aug 2024 08:00) (74/49 - 137/75)  BP(mean): 62 (06 Aug 2024 08:00) (55 - 95)  RR: 26 (06 Aug 2024 08:00) (19 - 34)  SpO2: 93% (06 Aug 2024 08:00) (91% - 97%)      I&O's Detail    05 Aug 2024 07:01  -  06 Aug 2024 07:00  --------------------------------------------------------  IN:    Oral Fluid: 800 mL  Total IN: 800 mL    OUT:    Voided (mL): 2400 mL  Total OUT: 2400 mL        Net:   I&O's Detail    04 Aug 2024 07:01  -  05 Aug 2024 07:00  --------------------------------------------------------  Total NET: -1450 mL      05 Aug 2024 07:01  -  06 Aug 2024 07:00  --------------------------------------------------------  Total NET: -1600 mL        CAPILLARY BLOOD GLUCOSE      POCT Blood Glucose.: 107 mg/dL (06 Aug 2024 06:52)  POCT Blood Glucose.: 111 mg/dL (05 Aug 2024 22:57)  POCT Blood Glucose.: 101 mg/dL (05 Aug 2024 16:00)  POCT Blood Glucose.: 99 mg/dL (05 Aug 2024 11:08)    Physical Exam:  General: NAD; A&Ox3/Patient is intubated and sedated  Cardiac: S1/S2, RRR, no murmur, no rubs  Lungs: unlabored respirations, CTA b/l, no wheeze, no rales, no crackles  Abdomen: Soft/NT/ND; positive bowel sounds x 4  Sternum: Intact, no click, incision healing well with no drainage  Incisions: Incisions clean/dry/intact  Extremities: No edema b/l lower extremities; good capillary refill; no cyanosis; palpable 1+ pedal pulses b/l    Central Venous Catheter: Yes[]  No[] , If Yes indication:           Day #  [place] Catheter: Yes  [] , No  [] , If yes indication:                      Day #  NGT: Yes [] No [] ,    If Yes Placement:                                     Day #  EPICARDIAL WIRES:  [] YES [] NO                                              Day #  BOWEL MOVEMENT:  [] YES [] NO, If No, Timing since last BM:      Day #  CHEST TUBE(Left/Right):  [] YES [] NO, If yes -  AIR LEAKS:  [] YES [] NO        LABS:                        9.4<L>  7.54  )-----------( 156      ( 06 Aug 2024 02:05 )             28.8<L>                        9.0<L>  9.55  )-----------( 128<L>    ( 05 Aug 2024 01:52 )             27.3<L>    08-06    139  |  104  |  14  ----------------------------<  94  4.1   |  23  |  1.0  08-05    140  |  105  |  16  ----------------------------<  96  4.5   |  26  |  0.9    Ca    8.5      06 Aug 2024 02:05  Mg     2.0     08-06    TPro  5.5<L> [6.0 - 8.0]  /  Alb  3.8 [3.5 - 5.2]  /  TBili  0.3 [0.2 - 1.2]  /  DBili  x   /  AST  23 [0 - 41]  /  ALT  10 [0 - 41]  /  AlkPhos  51 [30 - 115]  08-06      Urinalysis Basic - ( 06 Aug 2024 02:05 )    Color: x / Appearance: x / SG: x / pH: x  Gluc: 94 mg/dL / Ketone: x  / Bili: x / Urobili: x   Blood: x / Protein: x / Nitrite: x   Leuk Esterase: x / RBC: x / WBC x   Sq Epi: x / Non Sq Epi: x / Bacteria: x        RADIOLOGY & ADDITIONAL TESTS:  CXR:  EKG:  MEDICATIONS  (STANDING):  albuterol/ipratropium for Nebulization 3 milliLiter(s) Nebulizer every 6 hours  aspirin 325 milliGRAM(s) Oral daily  atorvastatin 80 milliGRAM(s) Oral at bedtime  bisacodyl Suppository 10 milliGRAM(s) Rectal once  chlorhexidine 2% Cloths 1 Application(s) Topical <User Schedule>  dextrose 5%. 1000 milliLiter(s) (50 mL/Hr) IV Continuous <Continuous>  dextrose 5%. 1000 milliLiter(s) (100 mL/Hr) IV Continuous <Continuous>  dextrose 50% Injectable 50 milliLiter(s) IV Push every 15 minutes  dextrose 50% Injectable 25 milliLiter(s) IV Push every 15 minutes  famotidine    Tablet 20 milliGRAM(s) Oral two times a day  glucagon  Injectable 1 milliGRAM(s) IntraMuscular once  heparin   Injectable 3000 Unit(s) SubCutaneous every 12 hours  insulin lispro (ADMELOG) corrective regimen sliding scale   SubCutaneous three times a day before meals  levothyroxine 88 MICROGram(s) Oral daily  meperidine     Injectable 25 milliGRAM(s) IV Push once  metoprolol tartrate 12.5 milliGRAM(s) Oral daily  nitroglycerin  Infusion 15 MICROgram(s)/Min (4.5 mL/Hr) IV Continuous <Continuous>  polyethylene glycol 3350 17 Gram(s) Oral daily  potassium chloride    Tablet ER 20 milliEquivalent(s) Oral daily  senna 2 Tablet(s) Oral at bedtime  sodium chloride 0.9%. 1000 milliLiter(s) (20 mL/Hr) IV Continuous <Continuous>  tamsulosin 0.4 milliGRAM(s) Oral at bedtime    MEDICATIONS  (PRN):  dextrose Oral Gel 15 Gram(s) Oral once PRN Blood Glucose LESS THAN 70 milliGRAM(s)/deciliter  melatonin 5 milliGRAM(s) Oral at bedtime PRN Sleep  ondansetron Injectable 4 milliGRAM(s) IV Push every 6 hours PRN Nausea and/or Vomiting  oxyCODONE    IR 10 milliGRAM(s) Oral every 4 hours PRN Severe Pain (7 - 10)  oxyCODONE    IR 5 milliGRAM(s) Oral every 4 hours PRN Moderate Pain (4 - 6)    HEPARIN:  [] YES [] NO  Dose: XX UNITS/HR UNITS Q8H  LOVENOX:[] YES [] NO  Dose: XX mg Q24H  COUMADIN: []  YES [] NO  Dose: XX mg  Q24H  SCD's: YES b/l  GI Prophylaxis: Protonix [], Pepcid [], None [], (Contra-indication:.....)    Post-Op Beta-Blockers: Yes [], No[], If No, then contraindication:  Post-Op Aspirin: Yes [],  No [], If No, then contraindication:  Post-Op Statin: Yes [], No[], If No, then contraindication:  Allergies    No Known Allergies    Intolerances      Ambulation/Activity Status:    Assessment/Plan:  69y Male status-post .....  - Case and plan discussed with CTU Intensivist and CT Surgeon - Dr. Merritt/Boyd/Rajat  - Continue CTU supportive care    - Continue DVT/GI prophylaxis  - Incentive Spirometry 10 times an hour  - Continue to advance physical activity as tolerated and continue PT/OT as directed  1. CAD: Continue ASA, statin, BB  2. HTN:   3. A. Fib:   4. COPD/Hypoxia:   5. DM/Glucose Control:     Social Service Disposition:     OPERATIVE PROCEDURE(s):     CABGx4           POD #     4                  69yMale  SURGEON(s): NATI Earl  SUBJECTIVE ASSESSMENT: pt seen and examined. no acute complaints at this time.   Vital Signs Last 24 Hrs  T(F): 98.4 (06 Aug 2024 08:00), Max: 99.3 (05 Aug 2024 20:00)  HR: 85 (06 Aug 2024 08:00) (71 - 85)  BP: 89/50 (06 Aug 2024 08:00) (74/49 - 137/75)  BP(mean): 62 (06 Aug 2024 08:00) (55 - 95)  RR: 26 (06 Aug 2024 08:00) (19 - 34)  SpO2: 93% (06 Aug 2024 08:00) (91% - 97%)      I&O's Detail    05 Aug 2024 07:01  -  06 Aug 2024 07:00  --------------------------------------------------------  IN:    Oral Fluid: 800 mL  Total IN: 800 mL    OUT:    Voided (mL): 2400 mL  Total OUT: 2400 mL        Net:   I&O's Detail    04 Aug 2024 07:01  -  05 Aug 2024 07:00  --------------------------------------------------------  Total NET: -1450 mL      05 Aug 2024 07:01  -  06 Aug 2024 07:00  --------------------------------------------------------  Total NET: -1600 mL        CAPILLARY BLOOD GLUCOSE      POCT Blood Glucose.: 107 mg/dL (06 Aug 2024 06:52)  POCT Blood Glucose.: 111 mg/dL (05 Aug 2024 22:57)  POCT Blood Glucose.: 101 mg/dL (05 Aug 2024 16:00)  POCT Blood Glucose.: 99 mg/dL (05 Aug 2024 11:08)    Physical Exam:  General: NAD; A&Ox3, no focal deficits, clear speech   Cardiac: S1/S2, RRR, no murmur, no rubs  Lungs: unlabored respirations, CTA b/l, no wheeze, no rales, no crackles  Abdomen: Soft/NT/ND; positive bowel sounds x 4  Sternum: Intact, no click, incision healing well with no drainage  Incisions: Incisions clean/dry/intact  Extremities: No edema b/l lower extremities; good capillary refill; no cyanosis; palpable 1+ pedal pulses b/l    Central Venous Catheter: Yes[x]  No[] , If Yes indication:     critical        Day # 4  BOWEL MOVEMENT:  [x] YES [] NO, If No, Timing since last BM:      Day #      LABS:                        9.4<L>  7.54  )-----------( 156      ( 06 Aug 2024 02:05 )             28.8<L>                        9.0<L>  9.55  )-----------( 128<L>    ( 05 Aug 2024 01:52 )             27.3<L>    08-06    139  |  104  |  14  ----------------------------<  94  4.1   |  23  |  1.0  08-05    140  |  105  |  16  ----------------------------<  96  4.5   |  26  |  0.9    Ca    8.5      06 Aug 2024 02:05  Mg     2.0     08-06    TPro  5.5<L> [6.0 - 8.0]  /  Alb  3.8 [3.5 - 5.2]  /  TBili  0.3 [0.2 - 1.2]  /  DBili  x   /  AST  23 [0 - 41]  /  ALT  10 [0 - 41]  /  AlkPhos  51 [30 - 115]  08-06      Urinalysis Basic - ( 06 Aug 2024 02:05 )    Color: x / Appearance: x / SG: x / pH: x  Gluc: 94 mg/dL / Ketone: x  / Bili: x / Urobili: x   Blood: x / Protein: x / Nitrite: x   Leuk Esterase: x / RBC: x / WBC x   Sq Epi: x / Non Sq Epi: x / Bacteria: x        RADIOLOGY & ADDITIONAL TESTS:  CXR: < from: Xray Chest 1 View- PORTABLE-Routine (Xray Chest 1 View- PORTABLE-Routine in AM.) (08.06.24 @ 03:58) >  Impression:  Low lung volume. Stable basilar opacities.    < end of copied text >    EKG: < from: 12 Lead ECG (08.06.24 @ 07:08) >    Ventricular Rate 75 BPM    Atrial Rate 75 BPM    P-R Interval 142 ms    QRS Duration 74 ms    Q-T Interval 398 ms    QTC Calculation(Bazett) 444 ms    P Axis 19 degrees    R Axis -3 degrees    T Axis 59 degrees    Diagnosis Line Normal sinus rhythm  Normal ECG    < end of copied text >    MEDICATIONS  (STANDING):  albuterol/ipratropium for Nebulization 3 milliLiter(s) Nebulizer every 6 hours  aspirin 325 milliGRAM(s) Oral daily  atorvastatin 80 milliGRAM(s) Oral at bedtime  bisacodyl Suppository 10 milliGRAM(s) Rectal once  chlorhexidine 2% Cloths 1 Application(s) Topical <User Schedule>  dextrose 5%. 1000 milliLiter(s) (50 mL/Hr) IV Continuous <Continuous>  dextrose 5%. 1000 milliLiter(s) (100 mL/Hr) IV Continuous <Continuous>  dextrose 50% Injectable 50 milliLiter(s) IV Push every 15 minutes  dextrose 50% Injectable 25 milliLiter(s) IV Push every 15 minutes  famotidine    Tablet 20 milliGRAM(s) Oral two times a day  glucagon  Injectable 1 milliGRAM(s) IntraMuscular once  heparin   Injectable 3000 Unit(s) SubCutaneous every 12 hours  insulin lispro (ADMELOG) corrective regimen sliding scale   SubCutaneous three times a day before meals  levothyroxine 88 MICROGram(s) Oral daily  meperidine     Injectable 25 milliGRAM(s) IV Push once  metoprolol tartrate 12.5 milliGRAM(s) Oral daily  nitroglycerin  Infusion 15 MICROgram(s)/Min (4.5 mL/Hr) IV Continuous <Continuous>  polyethylene glycol 3350 17 Gram(s) Oral daily  potassium chloride    Tablet ER 20 milliEquivalent(s) Oral daily  senna 2 Tablet(s) Oral at bedtime  sodium chloride 0.9%. 1000 milliLiter(s) (20 mL/Hr) IV Continuous <Continuous>  tamsulosin 0.4 milliGRAM(s) Oral at bedtime    MEDICATIONS  (PRN):  dextrose Oral Gel 15 Gram(s) Oral once PRN Blood Glucose LESS THAN 70 milliGRAM(s)/deciliter  melatonin 5 milliGRAM(s) Oral at bedtime PRN Sleep  ondansetron Injectable 4 milliGRAM(s) IV Push every 6 hours PRN Nausea and/or Vomiting  oxyCODONE    IR 10 milliGRAM(s) Oral every 4 hours PRN Severe Pain (7 - 10)  oxyCODONE    IR 5 milliGRAM(s) Oral every 4 hours PRN Moderate Pain (4 - 6)    HEPARIN:  [x] YES [] NO  Dose: 3000 UNITS Q8H  SCD's: YES b/l  GI Prophylaxis: Protonix [], Pepcid [x], None [], (Contra-indication:.....)    Post-Op Beta-Blockers: Yes [x], No[], If No, then contraindication:  Post-Op Aspirin: Yes [x],  No [], If No, then contraindication:  Post-Op Statin: Yes [x], No[], If No, then contraindication:  Allergies    No Known Allergies    Intolerances      Ambulation/Activity Status: ambulate       Assessment/Plan:  69y Male status-post CABGX4 POD#4  - Case and plan discussed with CTU Intensivist and CT Surgeon - Dr. Earl  - Continue CTU supportive care and ongoing plan of care as per continuing CTU rounds.   - Continue DVT/GI prophylaxis  - Incentive Spirometry 10 times an hour  - Continue to advance physical activity as tolerated and continue PT/OT as directed  1. CAD s/p CABG: Continue ASA, statin, BB, start plavix tomorrow   2. BPH: cont Flomax 0.4 mg oral capsule: 1 cap(s) orally once a day.  3. Hypothyroidism: Cont home dose levothyroxine 88 mcg (0.088 mg) oral tablet: 1 tab(s) orally once a day.      Social Service Disposition:  home

## 2024-08-07 ENCOUNTER — RESULT REVIEW (OUTPATIENT)
Age: 69
End: 2024-08-07

## 2024-08-07 ENCOUNTER — TRANSCRIPTION ENCOUNTER (OUTPATIENT)
Age: 69
End: 2024-08-07

## 2024-08-07 LAB
ALBUMIN SERPL ELPH-MCNC: 3.9 G/DL — SIGNIFICANT CHANGE UP (ref 3.5–5.2)
ALBUMIN SERPL ELPH-MCNC: 4 G/DL — SIGNIFICANT CHANGE UP (ref 3.5–5.2)
ALP SERPL-CCNC: 55 U/L — SIGNIFICANT CHANGE UP (ref 30–115)
ALP SERPL-CCNC: 60 U/L — SIGNIFICANT CHANGE UP (ref 30–115)
ALT FLD-CCNC: 13 U/L — SIGNIFICANT CHANGE UP (ref 0–41)
ALT FLD-CCNC: 15 U/L — SIGNIFICANT CHANGE UP (ref 0–41)
ANION GAP SERPL CALC-SCNC: 11 MMOL/L — SIGNIFICANT CHANGE UP (ref 7–14)
ANION GAP SERPL CALC-SCNC: 13 MMOL/L — SIGNIFICANT CHANGE UP (ref 7–14)
APPEARANCE UR: CLEAR — SIGNIFICANT CHANGE UP
APTT BLD: 29.3 SEC — SIGNIFICANT CHANGE UP (ref 27–39.2)
AST SERPL-CCNC: 20 U/L — SIGNIFICANT CHANGE UP (ref 0–41)
AST SERPL-CCNC: 22 U/L — SIGNIFICANT CHANGE UP (ref 0–41)
BASOPHILS # BLD AUTO: 0.06 K/UL — SIGNIFICANT CHANGE UP (ref 0–0.2)
BASOPHILS # BLD AUTO: 0.07 K/UL — SIGNIFICANT CHANGE UP (ref 0–0.2)
BASOPHILS NFR BLD AUTO: 0.9 % — SIGNIFICANT CHANGE UP (ref 0–1)
BASOPHILS NFR BLD AUTO: 1 % — SIGNIFICANT CHANGE UP (ref 0–1)
BILIRUB SERPL-MCNC: 0.4 MG/DL — SIGNIFICANT CHANGE UP (ref 0.2–1.2)
BILIRUB SERPL-MCNC: 0.5 MG/DL — SIGNIFICANT CHANGE UP (ref 0.2–1.2)
BILIRUB UR-MCNC: NEGATIVE — SIGNIFICANT CHANGE UP
BUN SERPL-MCNC: 16 MG/DL — SIGNIFICANT CHANGE UP (ref 10–20)
BUN SERPL-MCNC: 16 MG/DL — SIGNIFICANT CHANGE UP (ref 10–20)
CALCIUM SERPL-MCNC: 8.7 MG/DL — SIGNIFICANT CHANGE UP (ref 8.4–10.5)
CALCIUM SERPL-MCNC: 8.9 MG/DL — SIGNIFICANT CHANGE UP (ref 8.4–10.5)
CHLORIDE SERPL-SCNC: 101 MMOL/L — SIGNIFICANT CHANGE UP (ref 98–110)
CHLORIDE SERPL-SCNC: 103 MMOL/L — SIGNIFICANT CHANGE UP (ref 98–110)
CO2 SERPL-SCNC: 24 MMOL/L — SIGNIFICANT CHANGE UP (ref 17–32)
CO2 SERPL-SCNC: 24 MMOL/L — SIGNIFICANT CHANGE UP (ref 17–32)
COLOR SPEC: YELLOW — SIGNIFICANT CHANGE UP
CREAT SERPL-MCNC: 1 MG/DL — SIGNIFICANT CHANGE UP (ref 0.7–1.5)
CREAT SERPL-MCNC: 1.1 MG/DL — SIGNIFICANT CHANGE UP (ref 0.7–1.5)
DIFF PNL FLD: NEGATIVE — SIGNIFICANT CHANGE UP
EGFR: 74 ML/MIN/1.73M2 — SIGNIFICANT CHANGE UP
EGFR: 82 ML/MIN/1.73M2 — SIGNIFICANT CHANGE UP
EOSINOPHIL # BLD AUTO: 0.23 K/UL — SIGNIFICANT CHANGE UP (ref 0–0.7)
EOSINOPHIL # BLD AUTO: 0.27 K/UL — SIGNIFICANT CHANGE UP (ref 0–0.7)
EOSINOPHIL NFR BLD AUTO: 3.6 % — SIGNIFICANT CHANGE UP (ref 0–8)
EOSINOPHIL NFR BLD AUTO: 3.6 % — SIGNIFICANT CHANGE UP (ref 0–8)
GLUCOSE SERPL-MCNC: 106 MG/DL — HIGH (ref 70–99)
GLUCOSE SERPL-MCNC: 99 MG/DL — SIGNIFICANT CHANGE UP (ref 70–99)
GLUCOSE UR QL: NEGATIVE MG/DL — SIGNIFICANT CHANGE UP
HCT VFR BLD CALC: 27.5 % — LOW (ref 42–52)
HCT VFR BLD CALC: 30.6 % — LOW (ref 42–52)
HGB BLD-MCNC: 9.1 G/DL — LOW (ref 14–18)
HGB BLD-MCNC: 9.9 G/DL — LOW (ref 14–18)
IMM GRANULOCYTES NFR BLD AUTO: 0.5 % — HIGH (ref 0.1–0.3)
IMM GRANULOCYTES NFR BLD AUTO: 0.5 % — HIGH (ref 0.1–0.3)
INR BLD: 1.16 RATIO — SIGNIFICANT CHANGE UP (ref 0.65–1.3)
KETONES UR-MCNC: NEGATIVE MG/DL — SIGNIFICANT CHANGE UP
LEUKOCYTE ESTERASE UR-ACNC: ABNORMAL
LYMPHOCYTES # BLD AUTO: 1.23 K/UL — SIGNIFICANT CHANGE UP (ref 1.2–3.4)
LYMPHOCYTES # BLD AUTO: 1.49 K/UL — SIGNIFICANT CHANGE UP (ref 1.2–3.4)
LYMPHOCYTES # BLD AUTO: 19.5 % — LOW (ref 20.5–51.1)
LYMPHOCYTES # BLD AUTO: 19.9 % — LOW (ref 20.5–51.1)
MAGNESIUM SERPL-MCNC: 2 MG/DL — SIGNIFICANT CHANGE UP (ref 1.8–2.4)
MCHC RBC-ENTMCNC: 29.9 PG — SIGNIFICANT CHANGE UP (ref 27–31)
MCHC RBC-ENTMCNC: 30.3 PG — SIGNIFICANT CHANGE UP (ref 27–31)
MCHC RBC-ENTMCNC: 32.4 G/DL — SIGNIFICANT CHANGE UP (ref 32–37)
MCHC RBC-ENTMCNC: 33.1 G/DL — SIGNIFICANT CHANGE UP (ref 32–37)
MCV RBC AUTO: 91.7 FL — SIGNIFICANT CHANGE UP (ref 80–94)
MCV RBC AUTO: 92.4 FL — SIGNIFICANT CHANGE UP (ref 80–94)
MONOCYTES # BLD AUTO: 0.7 K/UL — HIGH (ref 0.1–0.6)
MONOCYTES # BLD AUTO: 0.89 K/UL — HIGH (ref 0.1–0.6)
MONOCYTES NFR BLD AUTO: 11.1 % — HIGH (ref 1.7–9.3)
MONOCYTES NFR BLD AUTO: 11.9 % — HIGH (ref 1.7–9.3)
NEUTROPHILS # BLD AUTO: 4.06 K/UL — SIGNIFICANT CHANGE UP (ref 1.4–6.5)
NEUTROPHILS # BLD AUTO: 4.72 K/UL — SIGNIFICANT CHANGE UP (ref 1.4–6.5)
NEUTROPHILS NFR BLD AUTO: 63.2 % — SIGNIFICANT CHANGE UP (ref 42.2–75.2)
NEUTROPHILS NFR BLD AUTO: 64.3 % — SIGNIFICANT CHANGE UP (ref 42.2–75.2)
NITRITE UR-MCNC: NEGATIVE — SIGNIFICANT CHANGE UP
NRBC # BLD: 0 /100 WBCS — SIGNIFICANT CHANGE UP (ref 0–0)
NRBC # BLD: 0 /100 WBCS — SIGNIFICANT CHANGE UP (ref 0–0)
PH UR: 6.5 — SIGNIFICANT CHANGE UP (ref 5–8)
PLATELET # BLD AUTO: 168 K/UL — SIGNIFICANT CHANGE UP (ref 130–400)
PLATELET # BLD AUTO: 215 K/UL — SIGNIFICANT CHANGE UP (ref 130–400)
PMV BLD: 10 FL — SIGNIFICANT CHANGE UP (ref 7.4–10.4)
PMV BLD: 10.3 FL — SIGNIFICANT CHANGE UP (ref 7.4–10.4)
POTASSIUM SERPL-MCNC: 4.3 MMOL/L — SIGNIFICANT CHANGE UP (ref 3.5–5)
POTASSIUM SERPL-MCNC: 4.3 MMOL/L — SIGNIFICANT CHANGE UP (ref 3.5–5)
POTASSIUM SERPL-SCNC: 4.3 MMOL/L — SIGNIFICANT CHANGE UP (ref 3.5–5)
POTASSIUM SERPL-SCNC: 4.3 MMOL/L — SIGNIFICANT CHANGE UP (ref 3.5–5)
PROT SERPL-MCNC: 6.1 G/DL — SIGNIFICANT CHANGE UP (ref 6–8)
PROT SERPL-MCNC: 6.6 G/DL — SIGNIFICANT CHANGE UP (ref 6–8)
PROT UR-MCNC: SIGNIFICANT CHANGE UP MG/DL
PROTHROM AB SERPL-ACNC: 13.2 SEC — HIGH (ref 9.95–12.87)
RBC # BLD: 3 M/UL — LOW (ref 4.7–6.1)
RBC # BLD: 3.31 M/UL — LOW (ref 4.7–6.1)
RBC # FLD: 14.6 % — HIGH (ref 11.5–14.5)
RBC # FLD: 14.6 % — HIGH (ref 11.5–14.5)
SODIUM SERPL-SCNC: 138 MMOL/L — SIGNIFICANT CHANGE UP (ref 135–146)
SODIUM SERPL-SCNC: 138 MMOL/L — SIGNIFICANT CHANGE UP (ref 135–146)
SP GR SPEC: 1.01 — SIGNIFICANT CHANGE UP (ref 1–1.03)
UROBILINOGEN FLD QL: 2 MG/DL (ref 0.2–1)
WBC # BLD: 6.31 K/UL — SIGNIFICANT CHANGE UP (ref 4.8–10.8)
WBC # BLD: 7.48 K/UL — SIGNIFICANT CHANGE UP (ref 4.8–10.8)
WBC # FLD AUTO: 6.31 K/UL — SIGNIFICANT CHANGE UP (ref 4.8–10.8)
WBC # FLD AUTO: 7.48 K/UL — SIGNIFICANT CHANGE UP (ref 4.8–10.8)

## 2024-08-07 PROCEDURE — 93010 ELECTROCARDIOGRAM REPORT: CPT

## 2024-08-07 PROCEDURE — 99232 SBSQ HOSP IP/OBS MODERATE 35: CPT

## 2024-08-07 PROCEDURE — 93306 TTE W/DOPPLER COMPLETE: CPT | Mod: 26

## 2024-08-07 PROCEDURE — 71046 X-RAY EXAM CHEST 2 VIEWS: CPT | Mod: 26

## 2024-08-07 RX ORDER — METOPROLOL TARTRATE 100 MG
12.5 TABLET ORAL EVERY 12 HOURS
Refills: 0 | Status: DISCONTINUED | OUTPATIENT
Start: 2024-08-07 | End: 2024-08-07

## 2024-08-07 RX ORDER — METOPROLOL TARTRATE 100 MG
12.5 TABLET ORAL DAILY
Refills: 0 | Status: DISCONTINUED | OUTPATIENT
Start: 2024-08-07 | End: 2024-08-09

## 2024-08-07 RX ADMIN — CHLORHEXIDINE GLUCONATE 1 APPLICATION(S): 500 CLOTH TOPICAL at 05:14

## 2024-08-07 RX ADMIN — FAMOTIDINE 20 MILLIGRAM(S): 40 TABLET, FILM COATED ORAL at 17:20

## 2024-08-07 RX ADMIN — Medication 0.4 MILLIGRAM(S): at 21:25

## 2024-08-07 RX ADMIN — Medication 88 MICROGRAM(S): at 05:13

## 2024-08-07 RX ADMIN — IPRATROPIUM BROMIDE AND ALBUTEROL SULFATE 3 MILLILITER(S): 2.5; .5 SOLUTION RESPIRATORY (INHALATION) at 13:45

## 2024-08-07 RX ADMIN — Medication 17 GRAM(S): at 11:51

## 2024-08-07 RX ADMIN — HEPARIN SODIUM 3000 UNIT(S): 1000 INJECTION, SOLUTION INTRAVENOUS; SUBCUTANEOUS at 17:19

## 2024-08-07 RX ADMIN — Medication 325 MILLIGRAM(S): at 11:50

## 2024-08-07 RX ADMIN — IPRATROPIUM BROMIDE AND ALBUTEROL SULFATE 3 MILLILITER(S): 2.5; .5 SOLUTION RESPIRATORY (INHALATION) at 19:16

## 2024-08-07 RX ADMIN — SENNOSIDES 2 TABLET(S): 8.6 TABLET ORAL at 21:25

## 2024-08-07 RX ADMIN — Medication 12.5 MILLIGRAM(S): at 11:50

## 2024-08-07 RX ADMIN — FAMOTIDINE 20 MILLIGRAM(S): 40 TABLET, FILM COATED ORAL at 05:13

## 2024-08-07 RX ADMIN — IPRATROPIUM BROMIDE AND ALBUTEROL SULFATE 3 MILLILITER(S): 2.5; .5 SOLUTION RESPIRATORY (INHALATION) at 07:29

## 2024-08-07 RX ADMIN — POTASSIUM CHLORIDE 20 MILLIEQUIVALENT(S): 1500 TABLET, EXTENDED RELEASE ORAL at 11:50

## 2024-08-07 RX ADMIN — ATORVASTATIN CALCIUM 80 MILLIGRAM(S): 40 TABLET, FILM COATED ORAL at 21:25

## 2024-08-07 NOTE — DISCHARGE NOTE NURSING/CASE MANAGEMENT/SOCIAL WORK - PATIENT PORTAL LINK FT
You can access the FollowMyHealth Patient Portal offered by [facility] by registering at the following website: [email or web address]. By joining [facility]’s FollowMyHealth portal, you will also be able to view your health information using other applications (apps) compatible with our system.

## 2024-08-07 NOTE — DISCHARGE NOTE NURSING/CASE MANAGEMENT/SOCIAL WORK - NSDCPEWEB_GEN_ALL_CORE
[facility] for Tobacco Control website --- [email or web address] --- [email or web address]

## 2024-08-07 NOTE — PROGRESS NOTE ADULT - SUBJECTIVE AND OBJECTIVE BOX
OPERATIVE PROCEDURE(s): CABGx4               POD #5                       69yMale  SURGEON(s): NATI Earl  SUBJECTIVE ASSESSMENT: No acute complaints  Vital Signs Last 24 Hrs  T(F): 98.2 (07 Aug 2024 12:00), Max: 99.1 (06 Aug 2024 20:00)  HR: 75 (07 Aug 2024 13:00) (73 - 90)  BP: 96/65 (07 Aug 2024 13:00) (85/53 - 161/79)  BP(mean): 72 (07 Aug 2024 13:00) (63 - 116)  RR: 11 (07 Aug 2024 13:00) (11 - 37)  SpO2: 98% (07 Aug 2024 13:00) (92% - 99%)    I&O's Detail    06 Aug 2024 07:01  -  07 Aug 2024 07:00  --------------------------------------------------------  IN:    Oral Fluid: 740 mL  Total IN: 740 mL    OUT:    Voided (mL): 1200 mL  Total OUT: 1200 mL        Net:   I&O's Detail    05 Aug 2024 07:01  -  06 Aug 2024 07:00  --------------------------------------------------------  Total NET: -1600 mL      06 Aug 2024 07:01  -  07 Aug 2024 07:00  --------------------------------------------------------  Total NET: -460 mL        CAPILLARY BLOOD GLUCOSE        Physical Exam:  General-awake and alert in NAD  ENTPERRL, no nasal or ear drainage, oral mucosa moist no lesions, no JVD or goiter or nodes, good ROM  Chest-bilateral breath sounds, No significant rales, ronchi, or wheezes  Sternum stable: wound clean and dry  Coronary- regular rate and rhythme, no significant murmur appreciated  Abd- soft nontender, nondistended  Extremities: no edema, full ROM,   Neuro: Alert and oriented, sensation and muscle tone intact all extremities      Central Venous Catheter: Yes[]  No[] , If Yes indication:           Day #  [place]adena Catheter: Yes  [] , No  [] , If yes indication:                      Day #  NGT: Yes [] No [] ,    If Yes Placement:                                     Day #  EPICARDIAL WIRES:  [] YES [] NO                                              Day #  BOWEL MOVEMENT:  [] YES [] NO, If No, Timing since last BM:      Day #  CHEST TUBE(Left/Right):  [] YES [] NO, If yes -  AIR LEAKS:  [] YES [] NO        LABS:                        9.9<L>  7.48  )-----------( 215      ( 07 Aug 2024 11:15 )             30.6<L>                        9.1<L>  6.31  )-----------( 168      ( 07 Aug 2024 01:53 )             27.5<L>    08-07    138  |  101  |  16  ----------------------------<  106<H>  4.3   |  24  |  1.1  08-07    138  |  103  |  16  ----------------------------<  99  4.3   |  24  |  1.0    Ca    8.9      07 Aug 2024 11:15  Mg     2.0     08-07    TPro  6.6 [6.0 - 8.0]  /  Alb  4.0 [3.5 - 5.2]  /  TBili  0.5 [0.2 - 1.2]  /  DBili  x   /  AST  22 [0 - 41]  /  ALT  15 [0 - 41]  /  AlkPhos  60 [30 - 115]  08-07    PT/INR - ( 07 Aug 2024 11:15 )   PT: ;   INR: 1.16 ratio         PTT - ( 07 Aug 2024 11:15 )  PTT:29.3 sec  Urinalysis Basic - ( 07 Aug 2024 11:15 )    Color: x / Appearance: x / SG: x / pH: x  Gluc: 106 mg/dL / Ketone: x  / Bili: x / Urobili: x   Blood: x / Protein: x / Nitrite: x   Leuk Esterase: x / RBC: x / WBC x   Sq Epi: x / Non Sq Epi: x / Bacteria: x        RADIOLOGY & ADDITIONAL TESTS:    MEDICATIONS  (STANDING):  albuterol/ipratropium for Nebulization 3 milliLiter(s) Nebulizer every 6 hours  aspirin 325 milliGRAM(s) Oral daily  atorvastatin 80 milliGRAM(s) Oral at bedtime  bisacodyl Suppository 10 milliGRAM(s) Rectal once  chlorhexidine 2% Cloths 1 Application(s) Topical <User Schedule>  dextrose 5%. 1000 milliLiter(s) (50 mL/Hr) IV Continuous <Continuous>  dextrose 5%. 1000 milliLiter(s) (100 mL/Hr) IV Continuous <Continuous>  famotidine    Tablet 20 milliGRAM(s) Oral two times a day  glucagon  Injectable 1 milliGRAM(s) IntraMuscular once  heparin   Injectable 3000 Unit(s) SubCutaneous every 12 hours  levothyroxine 88 MICROGram(s) Oral daily  metoprolol tartrate 12.5 milliGRAM(s) Oral daily  metoprolol tartrate 12.5 milliGRAM(s) Oral every 12 hours  polyethylene glycol 3350 17 Gram(s) Oral daily  potassium chloride    Tablet ER 20 milliEquivalent(s) Oral daily  senna 2 Tablet(s) Oral at bedtime  tamsulosin 0.4 milliGRAM(s) Oral at bedtime    MEDICATIONS  (PRN):  melatonin 5 milliGRAM(s) Oral at bedtime PRN Sleep  ondansetron Injectable 4 milliGRAM(s) IV Push every 6 hours PRN Nausea and/or Vomiting  oxyCODONE    IR 10 milliGRAM(s) Oral every 4 hours PRN Severe Pain (7 - 10)  oxyCODONE    IR 5 milliGRAM(s) Oral every 4 hours PRN Moderate Pain (4 - 6)    Allergies    No Known Allergies    Intolerances      Ambulation/Activity Status:    Assessment/Plan:  69y Male status-post CABG x4, POD 5. Hemodynamically stable and progressing well overall. Some mild hypotension. Possibly secondary to subclavian stenosis. Checking BP in multiple limbs. Working towards discharge tomorrow. Pull wires tomorrow.   Some questionable arrythmia during the day. EP briefly seen and felt to be artifact. STAT labs obtained: Hgb stable, electrolytes stable. Obtain ECHO now.   Case and plan discussed with CTU Intensivist and CT Surgeon - Dr. Earl. Continue CTU supportive care and ongoing plan of care as per continuing CTU rounds. Continue to advance physical activity as tolerated and continue PT/OT as directed    Cardiac  CAD s/p CABG x4  Hypotension  - POD 5  - ASA, BB, Statin  - Start Plavix at discharge  - Some mild hypotension. Possibly secondary to subclavian stenosis. Checking BP in multiple limbs. Holding parameters on BB    Arrythmia, questionable  - Some questionable arrythmia during the day. EP briefly seen and felt to be artifact. STAT labs obtained: Hgb stable, electrolytes stable. Obtain ECHO now.     Respiratory  Room air  Incentive Spirometry 10 times an hour    Renal/  BPH  - continue Flomax 0.4 mg    GI/Nutrition  GI ppx with Pepcid  DASH diet    Endocrine  Hypothyroidism  - Cont home dose levothyroxine 88 mcg     ID  No acute issues    Neurological  No acute issues    Hematology  - sq Heparin  - SCDs bilaterally    Social Service Disposition:

## 2024-08-07 NOTE — DISCHARGE NOTE NURSING/CASE MANAGEMENT/SOCIAL WORK - [email or web address]_GEN_ALL_CORE
[facility] email [email or web address]

## 2024-08-07 NOTE — PROGRESS NOTE ADULT - SUBJECTIVE AND OBJECTIVE BOX
FLAVIA TOVAR  MRN#: [id number]  Subjective:  Patient was seen and evalauted on AM rounds offerring no specific compliants at this time.    OBJECTIVE:  ICU Vital Signs Last 24 Hrs  T(C): 36.8 (07 Aug 2024 12:00), Max: 37.3 (06 Aug 2024 20:00)  T(F): 98.2 (07 Aug 2024 12:00), Max: 99.1 (06 Aug 2024 20:00)  HR: 79 (07 Aug 2024 12:) (73 - 90)  BP: 113/70 (07 Aug 2024 12:00) (85/53 - 161/79)  BP(mean): 86 (07 Aug 2024 12:00) (63 - 116)  ABP: --  ABP(mean): --  RR: 19 (07 Aug 2024 12:00) (18 - 37)  SpO2: 98% (07 Aug 2024 12:00) (92% - 99%)    O2 Parameters below as of 07 Aug 2024 13:00  Patient On (Oxygen Delivery Method): nasal cannula  O2 Flow (L/min): 2           @ :  -   @ 07:00  --------------------------------------------------------  IN: 740 mL / OUT: 1200 mL / NET: -460 mL    0807 @ 07: @ 13:00  --------------------------------------------------------  IN: 300 mL / OUT: 0 mL / NET: 300 mL      CAPILLARY BLOOD GLUCOSE      POCT Blood Glucose.: 107 mg/dL (06 Aug 2024 06:52)      PHYSICAL EXAM:Daily     Daily Weight in k.6 (07 Aug 2024 06:00)  General: WN/WD NAD    HEENT:     + NCAT  + EOMI  - Conjuctival edema   - Icterus   - Thrush   - ETT  - NGT/OGT    Neck:         + FROM    - JVD     - Nodes     - Masses    + Mid-line trachea   - Tracheostomy    Chest:         - Sternal click  - Sternal drainage  + Pacing wires  - Chest tubes  - SubQ emphysema    Lungs:          + CTA   - Rhonchi    - Rales    - Wheezing     - Decreased BS   - Dullness R L    Cardiac:       + S1 + S2    + RRR   - Irregular   - S3  - S4    - Murmurs   - Rub   - Hamman’s sign     Abdomen:    + BS     + Soft    + Non-tender     - Distended    - Organomegaly  - PEG    Extremities:   - Cyanosis U/L   - Clubbing  U/L  - LE/UE Edema   + Capillary refill    + Pulses     Neuro:        + Awake   +  Alert   - Confused   - Lethargic   - Sedated   - Generalized Weakness    Skin:        - Rashes    - Erythema   + Normal incisions   + IV sites intact  - Sacral decubitus    HOSPITAL MEDICATIONS:  MEDICATIONS  (STANDING):  albuterol/ipratropium for Nebulization 3 milliLiter(s) Nebulizer every 6 hours  aspirin 325 milliGRAM(s) Oral daily  atorvastatin 80 milliGRAM(s) Oral at bedtime  bisacodyl Suppository 10 milliGRAM(s) Rectal once  chlorhexidine 2% Cloths 1 Application(s) Topical <User Schedule>  dextrose 5%. 1000 milliLiter(s) (100 mL/Hr) IV Continuous <Continuous>  dextrose 5%. 1000 milliLiter(s) (50 mL/Hr) IV Continuous <Continuous>  famotidine    Tablet 20 milliGRAM(s) Oral two times a day  glucagon  Injectable 1 milliGRAM(s) IntraMuscular once  heparin   Injectable 3000 Unit(s) SubCutaneous every 12 hours  levothyroxine 88 MICROGram(s) Oral daily  metoprolol tartrate 12.5 milliGRAM(s) Oral daily  metoprolol tartrate 12.5 milliGRAM(s) Oral every 12 hours  polyethylene glycol 3350 17 Gram(s) Oral daily  potassium chloride    Tablet ER 20 milliEquivalent(s) Oral daily  senna 2 Tablet(s) Oral at bedtime  tamsulosin 0.4 milliGRAM(s) Oral at bedtime    MEDICATIONS  (PRN):  melatonin 5 milliGRAM(s) Oral at bedtime PRN Sleep  ondansetron Injectable 4 milliGRAM(s) IV Push every 6 hours PRN Nausea and/or Vomiting  oxyCODONE    IR 10 milliGRAM(s) Oral every 4 hours PRN Severe Pain (7 - 10)  oxyCODONE    IR 5 milliGRAM(s) Oral every 4 hours PRN Moderate Pain (4 - 6)      LABS:                        9.9    7.48  )-----------( 215      ( 07 Aug 2024 11:15 )             30.6    08-07    138  |  101  |  16  ----------------------------<  106<H>  4.3   |  24  |  1.1    Ca    8.9      07 Aug 2024 11:15  Mg     2.0     08-07    TPro  6.6  /  Alb  4.0  /  TBili  0.5  /  DBili  x   /  AST  22  /  ALT  15  /  AlkPhos  60  08-07    PT/INR - ( 07 Aug 2024 11:15 )   PT: 13.20 sec;   INR: 1.16 ratio         PTT - ( 07 Aug 2024 11:15 )  PTT:29.3 sec LIVER FUNCTIONS - ( 07 Aug 2024 11:15 )  Alb: 4.0 g/dL / Pro: 6.6 g/dL / ALK PHOS: 60 U/L / ALT: 15 U/L / AST: 22 U/L / GGT: x           Urinalysis Basic - ( 07 Aug 2024 11:15 )    Color: x / Appearance: x / SG: x / pH: x  Gluc: 106 mg/dL / Ketone: x  / Bili: x / Urobili: x   Blood: x / Protein: x / Nitrite: x   Leuk Esterase: x / RBC: x / WBC x   Sq Epi: x / Non Sq Epi: x / Bacteria: x        RADIOLOGY:  X Reviewed and interpreted by me: bibasilar effusions    CARDIOPULMONARY DYSFUNCTION  - Respiratory status required supplemental oxygen & the following of continuous pulse oximetry for support & to prevent decompensation  - Continued early mobilization as tolerated  - Addressed analgesic regimen to optimize function    PREVENTION-PROPHYLAXIS  - ASA continued for graft occlusion-thromboembolism prophylaxis  - Lipitor was also started for long term graft patency  - Lovenox/Heparin continued for VTE prophylaxis in addition to Venodyne boots  - Pepcid maintained for GI bleeding prophylaxis  - Metabolic stability & infection prophylaxis required review and adjustment of regular Insulin sliding scale and gylcemic regimen while following serial glucose levels to help achieve & maintain euglycemia  - Reviewed & addressed surgical site infection prophylaxis regimen

## 2024-08-07 NOTE — PROGRESS NOTE ADULT - SUBJECTIVE AND OBJECTIVE BOX
OPERATIVE PROCEDURE(s):                POD #                       69yMale  SURGEON(s): NATI Eral  SUBJECTIVE ASSESSMENT:   Vital Signs Last 24 Hrs  T(F): 98.4 (07 Aug 2024 08:00), Max: 99.1 (06 Aug 2024 20:00)  HR: 80 (07 Aug 2024 09:00) (73 - 90)  BP: 98/53 (07 Aug 2024 09:00) (85/53 - 126/73)  BP(mean): 71 (07 Aug 2024 09:00) (63 - 93)  ABP: --  ABP(mean): --  RR: 22 (07 Aug 2024 09:00) (18 - 31)  SpO2: 99% (07 Aug 2024 09:00) (92% - 99%)  CVP(mm Hg): --  CVP(cm H2O): --  CO: --  CI: --  PA: --  SVR: --    I&O's Detail    06 Aug 2024 07:01  -  07 Aug 2024 07:00  --------------------------------------------------------  IN:    Oral Fluid: 740 mL  Total IN: 740 mL    OUT:    Voided (mL): 1200 mL  Total OUT: 1200 mL        Net:   I&O's Detail    05 Aug 2024 07:01  -  06 Aug 2024 07:00  --------------------------------------------------------  Total NET: -1600 mL      06 Aug 2024 07:01  -  07 Aug 2024 07:00  --------------------------------------------------------  Total NET: -460 mL        CAPILLARY BLOOD GLUCOSE        Physical Exam:  General: NAD; A&Ox3/Patient is intubated and sedated  Cardiac: S1/S2, RRR, no murmur, no rubs  Lungs: unlabored respirations, CTA b/l, no wheeze, no rales, no crackles  Abdomen: Soft/NT/ND; positive bowel sounds x 4  Sternum: Intact, no click, incision healing well with no drainage  Incisions: Incisions clean/dry/intact  Extremities: No edema b/l lower extremities; good capillary refill; no cyanosis; palpable 1+ pedal pulses b/l    Central Venous Catheter: Yes[]  No[] , If Yes indication:           Day #  [place]ad[place] Catheter: Yes  [] , No  [] , If yes indication:                      Day #  NGT: Yes [] No [] ,    If Yes Placement:                                     Day #  EPICARDIAL WIRES:  [] YES [] NO                                              Day #  BOWEL MOVEMENT:  [] YES [] NO, If No, Timing since last BM:      Day #  CHEST TUBE(Left/Right):  [] YES [] NO, If yes -  AIR LEAKS:  [] YES [] NO        LABS:                        9.1<L>  6.31  )-----------( 168      ( 07 Aug 2024 01:53 )             27.5<L>                        9.4<L>  7.54  )-----------( 156      ( 06 Aug 2024 02:05 )             28.8<L>    08-07    138  |  103  |  16  ----------------------------<  99  4.3   |  24  |  1.0  08-06    135  |  102  |  17  ----------------------------<  132<H>  4.1   |  25  |  1.1    Ca    8.7      07 Aug 2024 01:53  Mg     2.0     08-07    TPro  6.1 [6.0 - 8.0]  /  Alb  3.9 [3.5 - 5.2]  /  TBili  0.4 [0.2 - 1.2]  /  DBili  x   /  AST  20 [0 - 41]  /  ALT  13 [0 - 41]  /  AlkPhos  55 [30 - 115]  08-07      Urinalysis Basic - ( 07 Aug 2024 01:53 )    Color: x / Appearance: x / SG: x / pH: x  Gluc: 99 mg/dL / Ketone: x  / Bili: x / Urobili: x   Blood: x / Protein: x / Nitrite: x   Leuk Esterase: x / RBC: x / WBC x   Sq Epi: x / Non Sq Epi: x / Bacteria: x        RADIOLOGY & ADDITIONAL TESTS:  CXR:  EKG:  MEDICATIONS  (STANDING):  albuterol/ipratropium for Nebulization 3 milliLiter(s) Nebulizer every 6 hours  aspirin 325 milliGRAM(s) Oral daily  atorvastatin 80 milliGRAM(s) Oral at bedtime  bisacodyl Suppository 10 milliGRAM(s) Rectal once  chlorhexidine 2% Cloths 1 Application(s) Topical <User Schedule>  dextrose 5%. 1000 milliLiter(s) (50 mL/Hr) IV Continuous <Continuous>  dextrose 5%. 1000 milliLiter(s) (100 mL/Hr) IV Continuous <Continuous>  famotidine    Tablet 20 milliGRAM(s) Oral two times a day  glucagon  Injectable 1 milliGRAM(s) IntraMuscular once  heparin   Injectable 3000 Unit(s) SubCutaneous every 12 hours  levothyroxine 88 MICROGram(s) Oral daily  metoprolol tartrate 12.5 milliGRAM(s) Oral daily  polyethylene glycol 3350 17 Gram(s) Oral daily  potassium chloride    Tablet ER 20 milliEquivalent(s) Oral daily  senna 2 Tablet(s) Oral at bedtime  sodium chloride 0.9%. 1000 milliLiter(s) (20 mL/Hr) IV Continuous <Continuous>  tamsulosin 0.4 milliGRAM(s) Oral at bedtime    MEDICATIONS  (PRN):  melatonin 5 milliGRAM(s) Oral at bedtime PRN Sleep  ondansetron Injectable 4 milliGRAM(s) IV Push every 6 hours PRN Nausea and/or Vomiting  oxyCODONE    IR 5 milliGRAM(s) Oral every 4 hours PRN Moderate Pain (4 - 6)  oxyCODONE    IR 10 milliGRAM(s) Oral every 4 hours PRN Severe Pain (7 - 10)    HEPARIN:  [] YES [] NO  Dose: XX UNITS/HR UNITS Q8H  LOVENOX:[] YES [] NO  Dose: XX mg Q24H  COUMADIN: []  YES [] NO  Dose: XX mg  Q24H  SCD's: YES b/l  GI Prophylaxis: Protonix [], Pepcid [], None [], (Contra-indication:.....)    Post-Op Beta-Blockers: Yes [], No[], If No, then contraindication:  Post-Op Aspirin: Yes [],  No [], If No, then contraindication:  Post-Op Statin: Yes [], No[], If No, then contraindication:  Allergies    No Known Allergies    Intolerances      Ambulation/Activity Status:      Brief Overall Plan  - Check BP in both arms and leg. Holding parameters on Lopressor  - Pull wires tomorrow  - Remove RIJ central line    Cardiac  s/p CABGx4  - ASA      Respiratory        Renal      GI/Nutrition      Endocrine      ID      Neurological      Hematology        Assessment/Plan:  69y Male status-post .....  - Case and plan discussed with CTU Intensivist and CT Surgeon - Dr. Merritt/Boyd/Rajat  - Continue CTU supportive care    - Continue DVT/GI prophylaxis  - Incentive Spirometry 10 times an hour  - Continue to advance physical activity as tolerated and continue PT/OT as directed  1. CAD: Continue ASA, statin, BB  2. HTN:   3. A. Fib:   4. COPD/Hypoxia:   5. DM/Glucose Control:     Social Service Disposition:

## 2024-08-08 LAB
ALBUMIN SERPL ELPH-MCNC: 3.8 G/DL — SIGNIFICANT CHANGE UP (ref 3.5–5.2)
ALP SERPL-CCNC: 60 U/L — SIGNIFICANT CHANGE UP (ref 30–115)
ALT FLD-CCNC: 18 U/L — SIGNIFICANT CHANGE UP (ref 0–41)
ANION GAP SERPL CALC-SCNC: 12 MMOL/L — SIGNIFICANT CHANGE UP (ref 7–14)
ANISOCYTOSIS BLD QL: SIGNIFICANT CHANGE UP
AST SERPL-CCNC: 22 U/L — SIGNIFICANT CHANGE UP (ref 0–41)
BASOPHILS # BLD AUTO: 0 K/UL — SIGNIFICANT CHANGE UP (ref 0–0.2)
BASOPHILS NFR BLD AUTO: 0 % — SIGNIFICANT CHANGE UP (ref 0–1)
BILIRUB SERPL-MCNC: 0.4 MG/DL — SIGNIFICANT CHANGE UP (ref 0.2–1.2)
BUN SERPL-MCNC: 16 MG/DL — SIGNIFICANT CHANGE UP (ref 10–20)
CALCIUM SERPL-MCNC: 9.1 MG/DL — SIGNIFICANT CHANGE UP (ref 8.4–10.5)
CHLORIDE SERPL-SCNC: 103 MMOL/L — SIGNIFICANT CHANGE UP (ref 98–110)
CO2 SERPL-SCNC: 24 MMOL/L — SIGNIFICANT CHANGE UP (ref 17–32)
CREAT SERPL-MCNC: 1 MG/DL — SIGNIFICANT CHANGE UP (ref 0.7–1.5)
EGFR: 82 ML/MIN/1.73M2 — SIGNIFICANT CHANGE UP
EOSINOPHIL # BLD AUTO: 0.52 K/UL — SIGNIFICANT CHANGE UP (ref 0–0.7)
EOSINOPHIL NFR BLD AUTO: 7.6 % — SIGNIFICANT CHANGE UP (ref 0–8)
GLUCOSE SERPL-MCNC: 96 MG/DL — SIGNIFICANT CHANGE UP (ref 70–99)
HCT VFR BLD CALC: 29.3 % — LOW (ref 42–52)
HGB BLD-MCNC: 9.7 G/DL — LOW (ref 14–18)
LYMPHOCYTES # BLD AUTO: 1.34 K/UL — SIGNIFICANT CHANGE UP (ref 1.2–3.4)
LYMPHOCYTES # BLD AUTO: 19.8 % — LOW (ref 20.5–51.1)
MAGNESIUM SERPL-MCNC: 2.2 MG/DL — SIGNIFICANT CHANGE UP (ref 1.8–2.4)
MANUAL SMEAR VERIFICATION: SIGNIFICANT CHANGE UP
MCHC RBC-ENTMCNC: 30.6 PG — SIGNIFICANT CHANGE UP (ref 27–31)
MCHC RBC-ENTMCNC: 33.1 G/DL — SIGNIFICANT CHANGE UP (ref 32–37)
MCV RBC AUTO: 92.4 FL — SIGNIFICANT CHANGE UP (ref 80–94)
METAMYELOCYTES # FLD: 0.9 % — HIGH (ref 0–0)
MICROCYTES BLD QL: SIGNIFICANT CHANGE UP
MONOCYTES # BLD AUTO: 0.32 K/UL — SIGNIFICANT CHANGE UP (ref 0.1–0.6)
MONOCYTES NFR BLD AUTO: 4.7 % — SIGNIFICANT CHANGE UP (ref 1.7–9.3)
NEUTROPHILS # BLD AUTO: 4.54 K/UL — SIGNIFICANT CHANGE UP (ref 1.4–6.5)
NEUTROPHILS NFR BLD AUTO: 66 % — SIGNIFICANT CHANGE UP (ref 42.2–75.2)
NEUTS BAND # BLD: 1 % — SIGNIFICANT CHANGE UP (ref 0–6)
PLAT MORPH BLD: ABNORMAL
PLATELET # BLD AUTO: 194 K/UL — SIGNIFICANT CHANGE UP (ref 130–400)
PMV BLD: 9.9 FL — SIGNIFICANT CHANGE UP (ref 7.4–10.4)
POIKILOCYTOSIS BLD QL AUTO: SIGNIFICANT CHANGE UP
POLYCHROMASIA BLD QL SMEAR: SLIGHT — SIGNIFICANT CHANGE UP
POTASSIUM SERPL-MCNC: 4.7 MMOL/L — SIGNIFICANT CHANGE UP (ref 3.5–5)
POTASSIUM SERPL-SCNC: 4.7 MMOL/L — SIGNIFICANT CHANGE UP (ref 3.5–5)
PROT SERPL-MCNC: 6.7 G/DL — SIGNIFICANT CHANGE UP (ref 6–8)
RBC # BLD: 3.17 M/UL — LOW (ref 4.7–6.1)
RBC # FLD: 14.6 % — HIGH (ref 11.5–14.5)
RBC BLD AUTO: ABNORMAL
SMUDGE CELLS # BLD: PRESENT — SIGNIFICANT CHANGE UP
SODIUM SERPL-SCNC: 139 MMOL/L — SIGNIFICANT CHANGE UP (ref 135–146)
WBC # BLD: 6.78 K/UL — SIGNIFICANT CHANGE UP (ref 4.8–10.8)
WBC # FLD AUTO: 6.78 K/UL — SIGNIFICANT CHANGE UP (ref 4.8–10.8)

## 2024-08-08 PROCEDURE — 93010 ELECTROCARDIOGRAM REPORT: CPT

## 2024-08-08 PROCEDURE — 99232 SBSQ HOSP IP/OBS MODERATE 35: CPT

## 2024-08-08 RX ORDER — AMPICILLIN SOD/SULBACTAM SOD 3 G
1.5 VIAL (EA) INJECTION ONCE
Refills: 0 | Status: DISCONTINUED | OUTPATIENT
Start: 2024-08-08 | End: 2024-08-08

## 2024-08-08 RX ORDER — CLOPIDOGREL BISULFATE 75 MG/1
75 TABLET, FILM COATED ORAL DAILY
Refills: 0 | Status: DISCONTINUED | OUTPATIENT
Start: 2024-08-08 | End: 2024-08-09

## 2024-08-08 RX ORDER — ASPIRIN 500 MG
81 TABLET ORAL DAILY
Refills: 0 | Status: DISCONTINUED | OUTPATIENT
Start: 2024-08-08 | End: 2024-08-09

## 2024-08-08 RX ORDER — CIPROFLOXACIN 500 MG/1
500 TABLET, FILM COATED ORAL EVERY 12 HOURS
Refills: 0 | Status: DISCONTINUED | OUTPATIENT
Start: 2024-08-08 | End: 2024-08-08

## 2024-08-08 RX ORDER — AMPICILLIN SOD/SULBACTAM SOD 3 G
3 VIAL (EA) INJECTION EVERY 6 HOURS
Refills: 0 | Status: DISCONTINUED | OUTPATIENT
Start: 2024-08-08 | End: 2024-08-09

## 2024-08-08 RX ORDER — AMPICILLIN SOD/SULBACTAM SOD 3 G
3 VIAL (EA) INJECTION ONCE
Refills: 0 | Status: COMPLETED | OUTPATIENT
Start: 2024-08-08 | End: 2024-08-08

## 2024-08-08 RX ORDER — AMPICILLIN SOD/SULBACTAM SOD 3 G
1.5 VIAL (EA) INJECTION EVERY 6 HOURS
Refills: 0 | Status: DISCONTINUED | OUTPATIENT
Start: 2024-08-08 | End: 2024-08-08

## 2024-08-08 RX ORDER — AMPICILLIN SOD/SULBACTAM SOD 3 G
VIAL (EA) INJECTION
Refills: 0 | Status: DISCONTINUED | OUTPATIENT
Start: 2024-08-08 | End: 2024-08-08

## 2024-08-08 RX ORDER — MIDODRINE HYDROCHLORIDE 2.5 MG/1
5 TABLET ORAL THREE TIMES A DAY
Refills: 0 | Status: DISCONTINUED | OUTPATIENT
Start: 2024-08-08 | End: 2024-08-09

## 2024-08-08 RX ORDER — AMPICILLIN SOD/SULBACTAM SOD 3 G
VIAL (EA) INJECTION
Refills: 0 | Status: DISCONTINUED | OUTPATIENT
Start: 2024-08-08 | End: 2024-08-09

## 2024-08-08 RX ADMIN — ATORVASTATIN CALCIUM 80 MILLIGRAM(S): 40 TABLET, FILM COATED ORAL at 21:36

## 2024-08-08 RX ADMIN — FAMOTIDINE 20 MILLIGRAM(S): 40 TABLET, FILM COATED ORAL at 06:17

## 2024-08-08 RX ADMIN — IPRATROPIUM BROMIDE AND ALBUTEROL SULFATE 3 MILLILITER(S): 2.5; .5 SOLUTION RESPIRATORY (INHALATION) at 21:14

## 2024-08-08 RX ADMIN — CHLORHEXIDINE GLUCONATE 1 APPLICATION(S): 500 CLOTH TOPICAL at 06:25

## 2024-08-08 RX ADMIN — MIDODRINE HYDROCHLORIDE 5 MILLIGRAM(S): 2.5 TABLET ORAL at 18:11

## 2024-08-08 RX ADMIN — Medication 0.4 MILLIGRAM(S): at 21:35

## 2024-08-08 RX ADMIN — Medication 200 GRAM(S): at 18:11

## 2024-08-08 RX ADMIN — CLOPIDOGREL BISULFATE 75 MILLIGRAM(S): 75 TABLET, FILM COATED ORAL at 11:50

## 2024-08-08 RX ADMIN — HEPARIN SODIUM 3000 UNIT(S): 1000 INJECTION, SOLUTION INTRAVENOUS; SUBCUTANEOUS at 18:11

## 2024-08-08 RX ADMIN — IPRATROPIUM BROMIDE AND ALBUTEROL SULFATE 3 MILLILITER(S): 2.5; .5 SOLUTION RESPIRATORY (INHALATION) at 08:10

## 2024-08-08 RX ADMIN — Medication 88 MICROGRAM(S): at 06:20

## 2024-08-08 RX ADMIN — Medication 81 MILLIGRAM(S): at 11:51

## 2024-08-08 RX ADMIN — Medication 200 GRAM(S): at 11:49

## 2024-08-08 RX ADMIN — FAMOTIDINE 20 MILLIGRAM(S): 40 TABLET, FILM COATED ORAL at 18:11

## 2024-08-08 NOTE — PROGRESS NOTE ADULT - SUBJECTIVE AND OBJECTIVE BOX
OPERATIVE PROCEDURE(s):                POD #                       69yMale  SURGEON(s): NATI Earl  SUBJECTIVE ASSESSMENT:   Vital Signs Last 24 Hrs  T(F): 98.2 (08 Aug 2024 00:00), Max: 98.4 (07 Aug 2024 20:00)  HR: 82 (08 Aug 2024 08:00) (71 - 87)  BP: 111/56 (08 Aug 2024 08:00) (80/52 - 161/79)  BP(mean): 74 (08 Aug 2024 08:00) (61 - 116)  ABP: --  ABP(mean): --  RR: 19 (08 Aug 2024 08:00) (11 - 52)  SpO2: 95% (08 Aug 2024 08:00) (92% - 100%)  CVP(mm Hg): --  CVP(cm H2O): --  CO: --  CI: --  PA: --  SVR: --    I&O's Detail    07 Aug 2024 07:01  -  08 Aug 2024 07:00  --------------------------------------------------------  IN:    Oral Fluid: 544 mL  Total IN: 544 mL    OUT:    Voided (mL): 1295 mL  Total OUT: 1295 mL        Net:   I&O's Detail    06 Aug 2024 07:01  -  07 Aug 2024 07:00  --------------------------------------------------------  Total NET: -460 mL      07 Aug 2024 07:01  -  08 Aug 2024 07:00  --------------------------------------------------------  Total NET: -751 mL        CAPILLARY BLOOD GLUCOSE        Physical Exam:  General: NAD; A&Ox3/Patient is intubated and sedated  Cardiac: S1/S2, RRR, no murmur, no rubs  Lungs: unlabored respirations, CTA b/l, no wheeze, no rales, no crackles  Abdomen: Soft/NT/ND; positive bowel sounds x 4  Sternum: Intact, no click, incision healing well with no drainage  Incisions: Incisions clean/dry/intact  Extremities: No edema b/l lower extremities; good capillary refill; no cyanosis; palpable 1+ pedal pulses b/l    Central Venous Catheter: Yes[]  No[] , If Yes indication:           Day #  [place] Catheter: Yes  [] , No  [] , If yes indication:                      Day #  NGT: Yes [] No [] ,    If Yes Placement:                                     Day #  EPICARDIAL WIRES:  [] YES [] NO                                              Day #  BOWEL MOVEMENT:  [] YES [] NO, If No, Timing since last BM:      Day #  CHEST TUBE(Left/Right):  [] YES [] NO, If yes -  AIR LEAKS:  [] YES [] NO        LABS:                        9.7<L>  6.78  )-----------( 194      ( 08 Aug 2024 03:34 )             29.3<L>                        9.9<L>  7.48  )-----------( 215      ( 07 Aug 2024 11:15 )             30.6<L>    08-08    139  |  103  |  16  ----------------------------<  96  4.7   |  24  |  1.0  08-07    138  |  101  |  16  ----------------------------<  106<H>  4.3   |  24  |  1.1    Ca    9.1      08 Aug 2024 03:34  Mg     2.2     08-08    TPro  6.7 [6.0 - 8.0]  /  Alb  3.8 [3.5 - 5.2]  /  TBili  0.4 [0.2 - 1.2]  /  DBili  x   /  AST  22 [0 - 41]  /  ALT  18 [0 - 41]  /  AlkPhos  60 [30 - 115]  08-08    PT/INR - ( 07 Aug 2024 11:15 )   PT: ;   INR: 1.16 ratio         PTT - ( 07 Aug 2024 11:15 )  PTT:29.3 sec  Urinalysis Basic - ( 08 Aug 2024 03:34 )    Color: x / Appearance: x / SG: x / pH: x  Gluc: 96 mg/dL / Ketone: x  / Bili: x / Urobili: x   Blood: x / Protein: x / Nitrite: x   Leuk Esterase: x / RBC: x / WBC x   Sq Epi: x / Non Sq Epi: x / Bacteria: x        RADIOLOGY & ADDITIONAL TESTS:  CXR:  EKG:  MEDICATIONS  (STANDING):  albuterol/ipratropium for Nebulization 3 milliLiter(s) Nebulizer every 6 hours  aspirin 325 milliGRAM(s) Oral daily  atorvastatin 80 milliGRAM(s) Oral at bedtime  bisacodyl Suppository 10 milliGRAM(s) Rectal once  chlorhexidine 2% Cloths 1 Application(s) Topical <User Schedule>  dextrose 5%. 1000 milliLiter(s) (100 mL/Hr) IV Continuous <Continuous>  dextrose 5%. 1000 milliLiter(s) (50 mL/Hr) IV Continuous <Continuous>  famotidine    Tablet 20 milliGRAM(s) Oral two times a day  glucagon  Injectable 1 milliGRAM(s) IntraMuscular once  heparin   Injectable 3000 Unit(s) SubCutaneous every 12 hours  levothyroxine 88 MICROGram(s) Oral daily  metoprolol tartrate 12.5 milliGRAM(s) Oral daily  polyethylene glycol 3350 17 Gram(s) Oral daily  potassium chloride    Tablet ER 20 milliEquivalent(s) Oral daily  senna 2 Tablet(s) Oral at bedtime  tamsulosin 0.4 milliGRAM(s) Oral at bedtime    MEDICATIONS  (PRN):  melatonin 5 milliGRAM(s) Oral at bedtime PRN Sleep  ondansetron Injectable 4 milliGRAM(s) IV Push every 6 hours PRN Nausea and/or Vomiting  oxyCODONE    IR 10 milliGRAM(s) Oral every 4 hours PRN Severe Pain (7 - 10)  oxyCODONE    IR 5 milliGRAM(s) Oral every 4 hours PRN Moderate Pain (4 - 6)    HEPARIN:  [] YES [] NO  Dose: XX UNITS/HR UNITS Q8H  LOVENOX:[] YES [] NO  Dose: XX mg Q24H  COUMADIN: []  YES [] NO  Dose: XX mg  Q24H  SCD's: YES b/l  GI Prophylaxis: Protonix [], Pepcid [], None [], (Contra-indication:.....)    Post-Op Beta-Blockers: Yes [], No[], If No, then contraindication:  Post-Op Aspirin: Yes [],  No [], If No, then contraindication:  Post-Op Statin: Yes [], No[], If No, then contraindication:  Allergies    No Known Allergies    Intolerances      Ambulation/Activity Status:    Assessment/Plan:  69y Male status-post CABG x4, POD 6. Hemodynamically stable and progressing well overall. Working towards discharge tomorrow. Pull wires today    Case and plan discussed with CTU Intensivist and CT Surgeon - Dr. Earl. Continue CTU supportive care and ongoing plan of care as per continuing CTU rounds. Continue to advance physical activity as tolerated and continue PT/OT as directed      - removed central line  - start Cipro  - Pull wires    Cardiac  CAD s/p CABG x4  Hypotension  - POD 6  - ASA + Plavix  - BB, Statin  - Some mild hypotension. Possibly secondary to subclavian stenosis. Checking BP in multiple limbs. Holding parameters on BB    Arrythmia, questionable  - Some questionable arrythmia during the day. EP briefly seen and felt to be artifact. STAT labs obtained: Hgb stable, electrolytes stable. Obtain ECHO now.     Respiratory  Room air  Incentive Spirometry 10 times an hour    Renal/  BPH  - continue Flomax 0.4 mg    GI/Nutrition  GI ppx with Pepcid  DASH diet    Endocrine  Hypothyroidism  - Cont home dose levothyroxine 88 mcg     ID  Preoperative UTI  - Positive UA on 7/1  - Ciprofloxacin for 500 BID 3 days      Neurological  No acute issues    Hematology  - sq Heparin  - SCDs bilaterally      Social Service Disposition:     OPERATIVE PROCEDURE(s):    CABGx4            POD # 6                      69yMale  SURGEON(s): NATI Earl  SUBJECTIVE ASSESSMENT:   Vital Signs Last 24 Hrs  T(F): 98.2 (08 Aug 2024 00:00), Max: 98.4 (07 Aug 2024 20:00)  HR: 82 (08 Aug 2024 08:00) (71 - 87)  BP: 111/56 (08 Aug 2024 08:00) (80/52 - 161/79)  BP(mean): 74 (08 Aug 2024 08:00) (61 - 116)  ABP: --  ABP(mean): --  RR: 19 (08 Aug 2024 08:00) (11 - 52)  SpO2: 95% (08 Aug 2024 08:00) (92% - 100%)      I&O's Detail    07 Aug 2024 07:01  -  08 Aug 2024 07:00  --------------------------------------------------------  IN:    Oral Fluid: 544 mL  Total IN: 544 mL    OUT:    Voided (mL): 1295 mL  Total OUT: 1295 mL        Net:   I&O's Detail    06 Aug 2024 07:01  -  07 Aug 2024 07:00  --------------------------------------------------------  Total NET: -460 mL      07 Aug 2024 07:01  -  08 Aug 2024 07:00  --------------------------------------------------------  Total NET: -751 mL        CAPILLARY BLOOD GLUCOSE        Physical Exam:  General-awake and alert in NAD  ENT-PERRL, no nasal or ear drainage, oral mucosa moist no lesions, no JVD or goiter or nodes, good ROM  Chest-bilateral breath sounds, No significant rales, ronchi, or wheezes  Sternum stable:  serous drainage. No errythema  Coronary- regular rate and rhythme, no significant murmur appreciated  Abd- soft nontender, nondistended  Extremities: no edema, full ROM,   Neuro: Alert and oriented, sensation and muscle tone intact all extremities      Central Venous Catheter: Yes[]  No[x] , If Yes indication:           Day #  [place] Catheter: Yes  [] , No  [x] , If yes indication:                      Day #  NGT: Yes [] No [x] ,    If Yes Placement:                                     Day #  EPICARDIAL WIRES:  [] YES [x] NO                                              Day #  BOWEL MOVEMENT:  [x] YES [] NO, If No, Timing since last BM:  8/6    Day #  CHEST TUBE(Left/Right):  [] YES [x] NO, If yes -  AIR LEAKS:  [] YES [] NO        LABS:                        9.7<L>  6.78  )-----------( 194      ( 08 Aug 2024 03:34 )             29.3<L>                        9.9<L>  7.48  )-----------( 215      ( 07 Aug 2024 11:15 )             30.6<L>    08-08    139  |  103  |  16  ----------------------------<  96  4.7   |  24  |  1.0  08-07    138  |  101  |  16  ----------------------------<  106<H>  4.3   |  24  |  1.1    Ca    9.1      08 Aug 2024 03:34  Mg     2.2     08-08    TPro  6.7 [6.0 - 8.0]  /  Alb  3.8 [3.5 - 5.2]  /  TBili  0.4 [0.2 - 1.2]  /  DBili  x   /  AST  22 [0 - 41]  /  ALT  18 [0 - 41]  /  AlkPhos  60 [30 - 115]  08-08    PT/INR - ( 07 Aug 2024 11:15 )   PT: ;   INR: 1.16 ratio         PTT - ( 07 Aug 2024 11:15 )  PTT:29.3 sec  Urinalysis Basic - ( 08 Aug 2024 03:34 )    Color: x / Appearance: x / SG: x / pH: x  Gluc: 96 mg/dL / Ketone: x  / Bili: x / Urobili: x   Blood: x / Protein: x / Nitrite: x   Leuk Esterase: x / RBC: x / WBC x   Sq Epi: x / Non Sq Epi: x / Bacteria: x        RADIOLOGY & ADDITIONAL TESTS:  CXR: < from: Xray Chest 2 Views PA/Lat (08.07.24 @ 07:47) >  IMPRESSION:    Small bibasilar effusions.    < end of copied text >    EKG: < from: 12 Lead ECG (08.08.24 @ 07:16) >    Ventricular Rate 75 BPM    Atrial Rate 75 BPM    P-R Interval 132 ms    QRS Duration 76 ms    Q-T Interval 410 ms    QTC Calculation(Bazett) 457 ms    P Axis 12 degrees    R Axis -7 degrees    T Axis 83 degrees    Diagnosis Line Normal sinus rhythm  Normal ECG    < end of copied text >    MEDICATIONS  (STANDING):  albuterol/ipratropium for Nebulization 3 milliLiter(s) Nebulizer every 6 hours  aspirin 325 milliGRAM(s) Oral daily  atorvastatin 80 milliGRAM(s) Oral at bedtime  bisacodyl Suppository 10 milliGRAM(s) Rectal once  chlorhexidine 2% Cloths 1 Application(s) Topical <User Schedule>  dextrose 5%. 1000 milliLiter(s) (100 mL/Hr) IV Continuous <Continuous>  dextrose 5%. 1000 milliLiter(s) (50 mL/Hr) IV Continuous <Continuous>  famotidine    Tablet 20 milliGRAM(s) Oral two times a day  glucagon  Injectable 1 milliGRAM(s) IntraMuscular once  heparin   Injectable 3000 Unit(s) SubCutaneous every 12 hours  levothyroxine 88 MICROGram(s) Oral daily  metoprolol tartrate 12.5 milliGRAM(s) Oral daily  polyethylene glycol 3350 17 Gram(s) Oral daily  potassium chloride    Tablet ER 20 milliEquivalent(s) Oral daily  senna 2 Tablet(s) Oral at bedtime  tamsulosin 0.4 milliGRAM(s) Oral at bedtime    MEDICATIONS  (PRN):  melatonin 5 milliGRAM(s) Oral at bedtime PRN Sleep  ondansetron Injectable 4 milliGRAM(s) IV Push every 6 hours PRN Nausea and/or Vomiting  oxyCODONE    IR 10 milliGRAM(s) Oral every 4 hours PRN Severe Pain (7 - 10)  oxyCODONE    IR 5 milliGRAM(s) Oral every 4 hours PRN Moderate Pain (4 - 6)    Allergies  No Known Allergies  Intolerances      Ambulation/Activity Status: Ambulating    Assessment/Plan:  69y Male status-post CABG x4, POD 6. Hemodynamically stable and progressing well overall. Wires pulled today. Sternal incision with serous drainage. Start Unasyn now, plan for discharge with Augmentin. Work towards discharge tomorrow  Case and plan discussed with CTU Intensivist and CT Surgeon - Dr. Earl. Continue CTU supportive care and ongoing plan of care as per continuing CTU rounds. Continue to advance physical activity as tolerated and continue PT/OT as directed    Cardiac  CAD s/p CABG x4  Hypotension, resolved  - POD 6  - ASA + Plavix  - BB, Statin    Respiratory  Room air  Incentive Spirometry 10 times an hour    Renal/  BPH  - continue Flomax 0.4 mg    GI/Nutrition  GI ppx with Pepcid  DASH diet  LBM 8/6    Endocrine  Hypothyroidism  - Cont home dose levothyroxine 88 mcg     ID  Preoperative UTI  Sternal Incision Drainage  - Positive UA on 7/1  - Sternal incision found to have serous drainage.   - Unasyn now, discharge on Augmentin    Neurological  No acute issues    Hematology  - sq Heparin  - SCDs bilaterally      Social Service Disposition:  ICU today, home tomorrow   OPERATIVE PROCEDURE(s):    CABGx4            POD # 6                      69yMale  SURGEON(s): NATI Earl  SUBJECTIVE ASSESSMENT:   Vital Signs Last 24 Hrs  T(F): 98.2 (08 Aug 2024 00:00), Max: 98.4 (07 Aug 2024 20:00)  HR: 82 (08 Aug 2024 08:00) (71 - 87)  BP: 111/56 (08 Aug 2024 08:00) (80/52 - 161/79)  BP(mean): 74 (08 Aug 2024 08:00) (61 - 116)  ABP: --  ABP(mean): --  RR: 19 (08 Aug 2024 08:00) (11 - 52)  SpO2: 95% (08 Aug 2024 08:00) (92% - 100%)      I&O's Detail    07 Aug 2024 07:01  -  08 Aug 2024 07:00  --------------------------------------------------------  IN:    Oral Fluid: 544 mL  Total IN: 544 mL    OUT:    Voided (mL): 1295 mL  Total OUT: 1295 mL        Net:   I&O's Detail    06 Aug 2024 07:01  -  07 Aug 2024 07:00  --------------------------------------------------------  Total NET: -460 mL      07 Aug 2024 07:01  -  08 Aug 2024 07:00  --------------------------------------------------------  Total NET: -751 mL        CAPILLARY BLOOD GLUCOSE        Physical Exam:  General-awake and alert in NAD  HEENT-PERRL, no nasal or ear drainage, oral mucosa moist no lesions, no JVD or goiter or nodes, good ROM  Chest-bilateral breath sounds, No significant rales, ronchi, or wheezes  Sternum stable:  minimal serous drainage No erythema  Coronary- regular rate and rhythm no significant murmur appreciated  Abd- soft nontender, nondistended  Extremities: no edema, full ROM,   Neuro: Alert and oriented, sensation and muscle tone intact all extremities      Central Venous Catheter: Yes[]  No[x] , If Yes indication:           Day #  [place] Catheter: Yes  [] , No  [x] , If yes indication:                      Day #  NGT: Yes [] No [x] ,    If Yes Placement:                                     Day #  EPICARDIAL WIRES:  [] YES [x] NO                                              Day #  BOWEL MOVEMENT:  [x] YES [] NO, If No, Timing since last BM:  8/6    Day #  CHEST TUBE(Left/Right):  [] YES [x] NO, If yes -  AIR LEAKS:  [] YES [] NO        LABS:                        9.7<L>  6.78  )-----------( 194      ( 08 Aug 2024 03:34 )             29.3<L>                        9.9<L>  7.48  )-----------( 215      ( 07 Aug 2024 11:15 )             30.6<L>    08-08    139  |  103  |  16  ----------------------------<  96  4.7   |  24  |  1.0  08-07    138  |  101  |  16  ----------------------------<  106<H>  4.3   |  24  |  1.1    Ca    9.1      08 Aug 2024 03:34  Mg     2.2     08-08    TPro  6.7 [6.0 - 8.0]  /  Alb  3.8 [3.5 - 5.2]  /  TBili  0.4 [0.2 - 1.2]  /  DBili  x   /  AST  22 [0 - 41]  /  ALT  18 [0 - 41]  /  AlkPhos  60 [30 - 115]  08-08    PT/INR - ( 07 Aug 2024 11:15 )   PT: ;   INR: 1.16 ratio         PTT - ( 07 Aug 2024 11:15 )  PTT:29.3 sec  Urinalysis Basic - ( 08 Aug 2024 03:34 )    Color: x / Appearance: x / SG: x / pH: x  Gluc: 96 mg/dL / Ketone: x  / Bili: x / Urobili: x   Blood: x / Protein: x / Nitrite: x   Leuk Esterase: x / RBC: x / WBC x   Sq Epi: x / Non Sq Epi: x / Bacteria: x        RADIOLOGY & ADDITIONAL TESTS:  CXR: < from: Xray Chest 2 Views PA/Lat (08.07.24 @ 07:47) >  IMPRESSION:    Small bibasilar effusions.    < end of copied text >    EKG: < from: 12 Lead ECG (08.08.24 @ 07:16) >    Ventricular Rate 75 BPM    Atrial Rate 75 BPM    P-R Interval 132 ms    QRS Duration 76 ms    Q-T Interval 410 ms    QTC Calculation(Bazett) 457 ms    P Axis 12 degrees    R Axis -7 degrees    T Axis 83 degrees    Diagnosis Line Normal sinus rhythm  Normal ECG    < end of copied text >    MEDICATIONS  (STANDING):  albuterol/ipratropium for Nebulization 3 milliLiter(s) Nebulizer every 6 hours  aspirin 325 milliGRAM(s) Oral daily  atorvastatin 80 milliGRAM(s) Oral at bedtime  bisacodyl Suppository 10 milliGRAM(s) Rectal once  chlorhexidine 2% Cloths 1 Application(s) Topical <User Schedule>  dextrose 5%. 1000 milliLiter(s) (100 mL/Hr) IV Continuous <Continuous>  dextrose 5%. 1000 milliLiter(s) (50 mL/Hr) IV Continuous <Continuous>  famotidine    Tablet 20 milliGRAM(s) Oral two times a day  glucagon  Injectable 1 milliGRAM(s) IntraMuscular once  heparin   Injectable 3000 Unit(s) SubCutaneous every 12 hours  levothyroxine 88 MICROGram(s) Oral daily  metoprolol tartrate 12.5 milliGRAM(s) Oral daily  polyethylene glycol 3350 17 Gram(s) Oral daily  potassium chloride    Tablet ER 20 milliEquivalent(s) Oral daily  senna 2 Tablet(s) Oral at bedtime  tamsulosin 0.4 milliGRAM(s) Oral at bedtime    MEDICATIONS  (PRN):  melatonin 5 milliGRAM(s) Oral at bedtime PRN Sleep  ondansetron Injectable 4 milliGRAM(s) IV Push every 6 hours PRN Nausea and/or Vomiting  oxyCODONE    IR 10 milliGRAM(s) Oral every 4 hours PRN Severe Pain (7 - 10)  oxyCODONE    IR 5 milliGRAM(s) Oral every 4 hours PRN Moderate Pain (4 - 6)    Allergies  No Known Allergies  Intolerances      Ambulation/Activity Status: Ambulating    Assessment/Plan:  69y Male status-post CABG x4, POD 6. Hemodynamically stable and progressing well overall. Wires pulled today. Sternal incision with serous drainage. Start Unasyn now, plan for discharge with Augmentin. Work towards discharge tomorrow  Case and plan discussed with CTU Intensivist and CT Surgeon - Dr. Earl. Continue CTU supportive care and ongoing plan of care as per continuing CTU rounds. Continue to advance physical activity as tolerated and continue PT/OT as directed    Cardiac  CAD s/p CABG x4  Hypotension, resolved  - POD 6  - ASA + Plavix  - BB, Statin    Respiratory  Room air  Incentive Spirometry 10 times an hour    Renal/  BPH  - continue Flomax 0.4 mg    GI/Nutrition  GI ppx with Pepcid  DASH diet  LBM 8/6    Endocrine  Hypothyroidism  - Cont home dose levothyroxine 88 mcg     ID  Preoperative UTI  Sternal Incision Drainage  - Positive UA on 7/1  - Sternal incision found to have serous drainage.   - Unasyn now, discharge on Augmentin    Neurological  No acute issues    Hematology  - sq Heparin  - SCDs bilaterally      Social Service Disposition:  ICU today, home tomorrow

## 2024-08-08 NOTE — PROGRESS NOTE ADULT - SUBJECTIVE AND OBJECTIVE BOX
CTU Attending Progress Daily Note     08 Aug 2024 08:03    Procedure:                                                  POD#                   Patient seen as post-op critical care follow-up    HPI:    70 y/o male, current smoker, w/ PMHx of HTN, HLD, hypothyroidism, BPH, CKD (baseline Cr 1.3-1.4), who presented to his cardiologist with c/o SOB on walking uphill and 1-2 flights of stairs, relieved with rest.  Pt also c/o occasional dizziness with postural changes.  He underwent a CCTA on 7/12/24 which revealed a CA score of 722, short segment mixed predominantly dense calcified plaque proximal LAD resulting in moderate to severe narrowing, mixed calcified and noncalcified plaque proximal LCx resulting in moderate to severe narrowing, and mixed predominantly dense calcified plaque proximal and mid RCA resulting in moderate narrowing.  CT FFR 7/25/24 -> LAD 0.59, LCx , OM 0.82, RCA 0.82.   Patient presents today for [facility] with possible intervention if clinically indicated.    Pre cath note:    indication:  [ ] STEMI                [ ] NSTEMI                 [ ] Acute coronary syndrome                     [ ]Unstable Angina   [ ] high risk  [ ] intermediate risk  [ ] low risk                     [ x] Stable Angina     non-invasive testing:    CCTA    Date:    7/12/24    result: [ ] high risk  [x ] intermediate risk  [ ] low risk    Anti- Anginal medications:                    [ ] not used                       [x ] used :   on BBn and Ranexa              [ ] not used but strong indication not to use    Ejection Fraction                   [ ] <29            [ ] 30-39%   [ ] 40-49%     [ ]>50%    CHF                   [ ] active (within last 14 days on meds   [ ] Chronic (on meds but no exacerbation)    COPD                   [ ] mild (on chronic bronchodilators)  [ ] moderate (on chronic steroid therapy)      [ ] severe (indication for home O2 or PACO2 >50)    Other risk factors:                       [ ] Previous MI                     [ ] CVA/ stroke                    [ ] carotid stent/ CEA                    [ ] PVD/PAD- (arterial aneurysm, non-palpable pulses, tortuous vessel with inability to insert catheter, infra-renal dissection, renal or subclavian artery stenosis)                    [ ] diabetic                    [ ] previous CABG                    [x ] Renal Failure       Right Filippo Test: positive    Adjusted Cath Bleeding Risk: 1.5%    Pre-cath Hydration: (x  )  cc IV bolus x 1 over 1 hr followed by ( x ) NS @ 100cc/hr until procedure                                                                                                                             (28 Jul 2024 14:10)    See preop testing chart H&P    Interval event for past 24 hr:  FLAVIA TOVAR  69y had no event.     Current Complains:  FLAVIA TOVAR has no new complaints    REVIEW OF SYSTEMS:  CONSTITUTIONAL:  [-] weakness, [-] fevers, [-] chills  EYES/ENT: [-] visual changes, [-] vertigo, [-] throat pain   NECK: [-] pain, [-] stiffness  RESPIRATORY: [-] cough, [-] wheezing, [-] hemoptysis, [-] shortness of breath  CARDIOVASCULAR: [-] chest pain, [-] palpitations, [-] orthopnea  GASTROINTESTINAL:    [-]abdominal pain, [-] nausea, [-] vomiting, [-] hematemesis, [-] diarrhea, [-] constipation, [-] melena, [-] hematochezia.  GENITOURINARY: [-] dysuria, [-] frequency, [-] hematuria  NEUROLOGICAL: [-] numbness, [-] weakness  SKIN: [-] itching, [-] burning, [-] rashes, [-] lesions   All other review of systems is negative unless indicated above.    [  ] Unable to assess ROS because :    OBJECTIVE:  ICU Vital Signs Last 24 Hrs  T(C): 36.8 (08 Aug 2024 00:00), Max: 36.9 (07 Aug 2024 20:00)  T(F): 98.2 (08 Aug 2024 00:00), Max: 98.4 (07 Aug 2024 20:00)  HR: 87 (08 Aug 2024 07:00) (71 - 87)  BP: 94/54 (08 Aug 2024 06:00) (80/52 - 161/79)  BP(mean): 68 (08 Aug 2024 06:00) (61 - 116)  ABP: --  ABP(mean): --  RR: 52 (08 Aug 2024 07:00) (11 - 52)  SpO2: 96% (08 Aug 2024 07:00) (92% - 100%)    O2 Parameters below as of 08 Aug 2024 04:00  Patient On (Oxygen Delivery Method): room air            I&O's Summary    07 Aug 2024 07:01  -  08 Aug 2024 07:00  --------------------------------------------------------  IN: 544 mL / OUT: 1295 mL / NET: -751 mL      Adult Advanced Hemodynamics Last 24 Hrs  CVP(mm Hg): --  CVP(cm H2O): --  CO: --  CI: --  PA: --  PA(mean): --  PCWP: --  SVR: --  SVRI: --  PVR: --  PVRI: --      PHYSICAL EXAM:  General: WN/WD NAD    HEENT:     [+] NCAT  [+] EOMI  [-] Conjuctival edema   [-] Icterus   [-] Thrush   [-] ETT  [-] NGT/OGT    Neck:         [+] FROM   [-] JVD     [-] Nodes     [-] Masses    [+] Mid-line trachea    [-] Tracheostomy    Chest:         [-] Sternal click   [-] Sternal drainage   [+] Pacing wires   [+] Chest tubes   [-] SubQ emphysema    Lungs:          [+] CTA   [-] Rhonchi   [-] Rales    [-] Wheezing    [-] Decreased BS    [-] Dullness R L    Cardiac:       [+] S1 [+] S2    [+] RRR   [-] Irregular   [-] S3   [-] S4    [-] Murmurs    [-] Rub    Abdomen:    [+] BS    [+] Soft    [+] Non-tender     [-] Distended    [-] Organomegaly  [-] PEG    Extremities:   [-] Cyanosis U/L   [-] Clubbing  U/L  [-] LE/UE Edema   [+] Capillary refill    [+] Pulses     Neuro:        [+] Awake   [+]  Alert   [-] Confused   [-] Lethargic   [-] Sedated   [-] Generalized Weakness    Skin:        [-] Rashes    [-] Erythema   [+] Normal incisions   [+] IV sites intact   [-] Sacral decubitus    Tubes:  LINES:    CAPILLARY BLOOD GLUCOSE        CAPILLARY BLOOD GLUCOSE          HOSPITAL MEDICATIONS:  MEDICATIONS  (STANDING):  albuterol/ipratropium for Nebulization 3 milliLiter(s) Nebulizer every 6 hours  aspirin 325 milliGRAM(s) Oral daily  atorvastatin 80 milliGRAM(s) Oral at bedtime  bisacodyl Suppository 10 milliGRAM(s) Rectal once  chlorhexidine 2% Cloths 1 Application(s) Topical <User Schedule>  dextrose 5%. 1000 milliLiter(s) (100 mL/Hr) IV Continuous <Continuous>  dextrose 5%. 1000 milliLiter(s) (50 mL/Hr) IV Continuous <Continuous>  famotidine    Tablet 20 milliGRAM(s) Oral two times a day  glucagon  Injectable 1 milliGRAM(s) IntraMuscular once  heparin   Injectable 3000 Unit(s) SubCutaneous every 12 hours  levothyroxine 88 MICROGram(s) Oral daily  metoprolol tartrate 12.5 milliGRAM(s) Oral daily  polyethylene glycol 3350 17 Gram(s) Oral daily  potassium chloride    Tablet ER 20 milliEquivalent(s) Oral daily  senna 2 Tablet(s) Oral at bedtime  tamsulosin 0.4 milliGRAM(s) Oral at bedtime    MEDICATIONS  (PRN):  melatonin 5 milliGRAM(s) Oral at bedtime PRN Sleep  ondansetron Injectable 4 milliGRAM(s) IV Push every 6 hours PRN Nausea and/or Vomiting  oxyCODONE    IR 10 milliGRAM(s) Oral every 4 hours PRN Severe Pain (7 - 10)  oxyCODONE    IR 5 milliGRAM(s) Oral every 4 hours PRN Moderate Pain (4 - 6)      LABS:                          9.7    6.78  )-----------( 194      ( 08 Aug 2024 03:34 )             29.3     08-08    139  |  103  |  16  ----------------------------<  96  4.7   |  24  |  1.0    Ca    9.1      08 Aug 2024 03:34  Mg     2.2     08-08    TPro  6.7  /  Alb  3.8  /  TBili  0.4  /  DBili  x   /  AST  22  /  ALT  18  /  AlkPhos  60  08-08    PT/INR - ( 07 Aug 2024 11:15 )   PT: 13.20 sec;   INR: 1.16 ratio         PTT - ( 07 Aug 2024 11:15 )  PTT:29.3 sec  Urinalysis Basic - ( 08 Aug 2024 03:34 )    Color: x / Appearance: x / SG: x / pH: x  Gluc: 96 mg/dL / Ketone: x  / Bili: x / Urobili: x   Blood: x / Protein: x / Nitrite: x   Leuk Esterase: x / RBC: x / WBC x   Sq Epi: x / Non Sq Epi: x / Bacteria: x          RADIOLOGY:  Reviewed and interpreted by me  CXR from 08-08-24 shows [+] mild congestion, [-] pneumothorax, [-] R/L effusion, [-] cardiomegaly,   NGT in place, S-G Catheter in place, R/L TLC in place, R/L Chest Tubes in place    ECG:  Reviewed and interpreted by me:   QTC:    Assessment:      PAST MEDICAL & SURGICAL HISTORY:  Hypothyroidism, unspecified type      High cholesterol      HTN (hypertension)      BPH (benign prostatic hyperplasia)      Chronic kidney disease (CKD)      No significant past surgical history          PLAN:  Neuro: Pain control  Pulm: Encourage coughing, deep breathing and use of incentive spirometry. Wean off supplemental oxygen as able. Daily CXR.   Cardio: Monitor telemetry/alarms. Continue cardiac meds  GI: Tolerating diet. Continue stool softeners. Continue GI prophylaxis  Renal: monitor urine output, supplement electrolytes as needed  Vasc: Heparin SC/SCDs for DVT prophylaxis  Heme: Monitor H/H.   ID: Off antibiotics. Stable.  Endocrine: Monitor finger stick blood sugar and control hyperglycemia with insulin  Physical Therapy: OOB/ambulate  Tubes: Monitor Chest tube output      Discussed with Cardiothoracic Team at AM rounds.    45 minutes of critical care time spent providing medical care for patient's acute illness/conditions that impairs at least one vital organ system and/or poses a high risk of imminent or life threatening deterioration in the patient's condition. It includes time spent evaluating and treating the patient's acute illness as well as time spent reviewing labs, radiology, discussing goals of care with patient and/or patient's family, and discussing the case with a multidisciplinary team in an effort to prevent further life threatening deterioration or end organ damage. This time is independent of any procedures performed. CTU Attending Progress Daily Note     08 Aug 2024 08:03    Procedure:                                                  POD#                   Patient seen as post-op critical care follow-up    HPI:    68 y/o male, current smoker, w/ PMHx of HTN, HLD, hypothyroidism, BPH, CKD (baseline Cr 1.3-1.4), who presented to his cardiologist with c/o SOB on walking uphill and 1-2 flights of stairs, relieved with rest.  Pt also c/o occasional dizziness with postural changes.  He underwent a CCTA on 7/12/24 which revealed a CA score of 722, short segment mixed predominantly dense calcified plaque proximal LAD resulting in moderate to severe narrowing, mixed calcified and noncalcified plaque proximal LCx resulting in moderate to severe narrowing, and mixed predominantly dense calcified plaque proximal and mid RCA resulting in moderate narrowing.  CT FFR 7/25/24 -> LAD 0.59, LCx , OM 0.82, RCA 0.82.   Patient presents today for [facility] with possible intervention if clinically indicated.                                                                                                                             (28 Jul 2024 14:10)      Interval event for past 24 hr:  FLAVIA TOVAR  69y had no event.     Current Complains:  FLAVIA TOVAR has no new complaints    REVIEW OF SYSTEMS:  CONSTITUTIONAL:  [-] weakness, [-] fevers, [-] chills  EYES/ENT: [-] visual changes, [-] vertigo, [-] throat pain   NECK: [-] pain, [-] stiffness  RESPIRATORY: [-] cough, [-] wheezing, [-] hemoptysis, [-] shortness of breath  CARDIOVASCULAR: [-] chest pain, [-] palpitations, [-] orthopnea  GASTROINTESTINAL:    [-]abdominal pain, [-] nausea, [-] vomiting, [-] hematemesis, [-] diarrhea, [-] constipation, [-] melena, [-] hematochezia.  GENITOURINARY: [-] dysuria, [-] frequency, [-] hematuria  NEUROLOGICAL: [-] numbness, [-] weakness  SKIN: [-] itching, [-] burning, [-] rashes, [-] lesions   All other review of systems is negative unless indicated above.    [  ] Unable to assess ROS because :    OBJECTIVE:  ICU Vital Signs Last 24 Hrs  T(C): 36.8 (08 Aug 2024 00:00), Max: 36.9 (07 Aug 2024 20:00)  T(F): 98.2 (08 Aug 2024 00:00), Max: 98.4 (07 Aug 2024 20:00)  HR: 87 (08 Aug 2024 07:00) (71 - 87)  BP: 94/54 (08 Aug 2024 06:00) (80/52 - 161/79)  BP(mean): 68 (08 Aug 2024 06:00) (61 - 116)  ABP: --  ABP(mean): --  RR: 52 (08 Aug 2024 07:00) (11 - 52)  SpO2: 96% (08 Aug 2024 07:00) (92% - 100%)    O2 Parameters below as of 08 Aug 2024 04:00  Patient On (Oxygen Delivery Method): room air            I&O's Summary    07 Aug 2024 07:01  -  08 Aug 2024 07:00  --------------------------------------------------------  IN: 544 mL / OUT: 1295 mL / NET: -751 mL      PHYSICAL EXAM:  General: WN/WD NAD    HEENT:     [+] NCAT  [+] EOMI  [-] Conjuctival edema   [-] Icterus   [-] Thrush   [-] ETT  [-] NGT/OGT    Neck:         [+] FROM   [-] JVD     [-] Nodes     [-] Masses    [+] Mid-line trachea    [-] Tracheostomy    Chest:         [-] Sternal click   [-] Sternal drainage   [+] Pacing wires   [-] Chest tubes   [-] SubQ emphysema    Lungs:          [+] CTA   [-] Rhonchi   [-] Rales    [-] Wheezing    [-] Decreased BS    [-] Dullness R L    Cardiac:       [+] S1 [+] S2    [+] RRR   [-] Irregular   [-] S3   [-] S4    [-] Murmurs    [-] Rub    Abdomen:    [+] BS    [+] Soft    [+] Non-tender     [-] Distended    [-] Organomegaly  [-] PEG    Extremities:   [-] Cyanosis U/L   [-] Clubbing  U/L  [-] LE/UE Edema   [+] Capillary refill    [+] Pulses     Neuro:        [+] Awake   [+]  Alert   [-] Confused   [-] Lethargic   [-] Sedated   [-] Generalized Weakness    Skin:        [-] Rashes    [-] Erythema   [+] Normal incisions   [+] IV sites intact   [-] Sacral decubitus      HOSPITAL MEDICATIONS:  MEDICATIONS  (STANDING):  albuterol/ipratropium for Nebulization 3 milliLiter(s) Nebulizer every 6 hours  aspirin 325 milliGRAM(s) Oral daily  atorvastatin 80 milliGRAM(s) Oral at bedtime  bisacodyl Suppository 10 milliGRAM(s) Rectal once  chlorhexidine 2% Cloths 1 Application(s) Topical <User Schedule>  dextrose 5%. 1000 milliLiter(s) (100 mL/Hr) IV Continuous <Continuous>  dextrose 5%. 1000 milliLiter(s) (50 mL/Hr) IV Continuous <Continuous>  famotidine    Tablet 20 milliGRAM(s) Oral two times a day  glucagon  Injectable 1 milliGRAM(s) IntraMuscular once  heparin   Injectable 3000 Unit(s) SubCutaneous every 12 hours  levothyroxine 88 MICROGram(s) Oral daily  metoprolol tartrate 12.5 milliGRAM(s) Oral daily  polyethylene glycol 3350 17 Gram(s) Oral daily  potassium chloride    Tablet ER 20 milliEquivalent(s) Oral daily  senna 2 Tablet(s) Oral at bedtime  tamsulosin 0.4 milliGRAM(s) Oral at bedtime    MEDICATIONS  (PRN):  melatonin 5 milliGRAM(s) Oral at bedtime PRN Sleep  ondansetron Injectable 4 milliGRAM(s) IV Push every 6 hours PRN Nausea and/or Vomiting  oxyCODONE    IR 10 milliGRAM(s) Oral every 4 hours PRN Severe Pain (7 - 10)  oxyCODONE    IR 5 milliGRAM(s) Oral every 4 hours PRN Moderate Pain (4 - 6)      LABS:                          9.7    6.78  )-----------( 194      ( 08 Aug 2024 03:34 )             29.3     08-08    139  |  103  |  16  ----------------------------<  96  4.7   |  24  |  1.0    Ca    9.1      08 Aug 2024 03:34  Mg     2.2     08-08    TPro  6.7  /  Alb  3.8  /  TBili  0.4  /  DBili  x   /  AST  22  /  ALT  18  /  AlkPhos  60  08-08    PT/INR - ( 07 Aug 2024 11:15 )   PT: 13.20 sec;   INR: 1.16 ratio         PTT - ( 07 Aug 2024 11:15 )  PTT:29.3 sec  Urinalysis Basic - ( 08 Aug 2024 03:34 )    Color: x / Appearance: x / SG: x / pH: x  Gluc: 96 mg/dL / Ketone: x  / Bili: x / Urobili: x   Blood: x / Protein: x / Nitrite: x   Leuk Esterase: x / RBC: x / WBC x   Sq Epi: x / Non Sq Epi: x / Bacteria: x          RADIOLOGY:  Reviewed and interpreted by me  CXR from 08-08-24 shows [+] mild congestion, [-] pneumothorax, [-] R/L effusion, [-] cardiomegaly,       ECG:  Reviewed and interpreted by me: NSR 75  QTC: 457    Assessment:  CAD SP CABG  Acute blood loss anemia    PAST MEDICAL & SURGICAL HISTORY:  Hypothyroidism, unspecified type      High cholesterol      HTN (hypertension)      BPH (benign prostatic hyperplasia)      Chronic kidney disease (CKD)      No significant past surgical history          PLAN:  Neuro: Pain control  Pulm: Encourage coughing, deep breathing and use of incentive spirometry. Wean off supplemental oxygen as able. Daily CXR.   Cardio: Monitor telemetry/alarms. Continue cardiac meds  GI: Tolerating diet. Continue stool softeners. Continue GI prophylaxis  Renal: monitor urine output, supplement electrolytes as needed  Vasc: Heparin SC/SCDs for DVT prophylaxis  Heme: Monitor H/H.   ID: Off antibiotics. Stable.  Endocrine: Monitor finger stick blood sugar and control hyperglycemia with insulin  Physical Therapy: OOB/ambulate        Discussed with Cardiothoracic Team at AM rounds.

## 2024-08-09 VITALS
RESPIRATION RATE: 20 BRPM | HEART RATE: 81 BPM | OXYGEN SATURATION: 98 % | SYSTOLIC BLOOD PRESSURE: 110 MMHG | DIASTOLIC BLOOD PRESSURE: 64 MMHG

## 2024-08-09 PROBLEM — N40.0 BENIGN PROSTATIC HYPERPLASIA WITHOUT LOWER URINARY TRACT SYMPTOMS: Chronic | Status: ACTIVE | Noted: 2024-07-31

## 2024-08-09 PROBLEM — N18.9 CHRONIC KIDNEY DISEASE, UNSPECIFIED: Chronic | Status: ACTIVE | Noted: 2024-07-31

## 2024-08-09 LAB
ALBUMIN SERPL ELPH-MCNC: 3.8 G/DL — SIGNIFICANT CHANGE UP (ref 3.5–5.2)
ALP SERPL-CCNC: 60 U/L — SIGNIFICANT CHANGE UP (ref 30–115)
ALT FLD-CCNC: 31 U/L — SIGNIFICANT CHANGE UP (ref 0–41)
ANION GAP SERPL CALC-SCNC: 11 MMOL/L — SIGNIFICANT CHANGE UP (ref 7–14)
AST SERPL-CCNC: 29 U/L — SIGNIFICANT CHANGE UP (ref 0–41)
BASOPHILS # BLD AUTO: 0.08 K/UL — SIGNIFICANT CHANGE UP (ref 0–0.2)
BASOPHILS NFR BLD AUTO: 1 % — SIGNIFICANT CHANGE UP (ref 0–1)
BILIRUB SERPL-MCNC: 0.4 MG/DL — SIGNIFICANT CHANGE UP (ref 0.2–1.2)
BUN SERPL-MCNC: 16 MG/DL — SIGNIFICANT CHANGE UP (ref 10–20)
CALCIUM SERPL-MCNC: 8.4 MG/DL — SIGNIFICANT CHANGE UP (ref 8.4–10.5)
CHLORIDE SERPL-SCNC: 101 MMOL/L — SIGNIFICANT CHANGE UP (ref 98–110)
CO2 SERPL-SCNC: 25 MMOL/L — SIGNIFICANT CHANGE UP (ref 17–32)
CREAT SERPL-MCNC: 1 MG/DL — SIGNIFICANT CHANGE UP (ref 0.7–1.5)
EGFR: 82 ML/MIN/1.73M2 — SIGNIFICANT CHANGE UP
EOSINOPHIL # BLD AUTO: 0.22 K/UL — SIGNIFICANT CHANGE UP (ref 0–0.7)
EOSINOPHIL NFR BLD AUTO: 2.9 % — SIGNIFICANT CHANGE UP (ref 0–8)
GLUCOSE SERPL-MCNC: 99 MG/DL — SIGNIFICANT CHANGE UP (ref 70–99)
HCT VFR BLD CALC: 29 % — LOW (ref 42–52)
HGB BLD-MCNC: 9.3 G/DL — LOW (ref 14–18)
IMM GRANULOCYTES NFR BLD AUTO: 0.7 % — HIGH (ref 0.1–0.3)
LYMPHOCYTES # BLD AUTO: 1.72 K/UL — SIGNIFICANT CHANGE UP (ref 1.2–3.4)
LYMPHOCYTES # BLD AUTO: 22.5 % — SIGNIFICANT CHANGE UP (ref 20.5–51.1)
MAGNESIUM SERPL-MCNC: 2.2 MG/DL — SIGNIFICANT CHANGE UP (ref 1.8–2.4)
MCHC RBC-ENTMCNC: 29.9 PG — SIGNIFICANT CHANGE UP (ref 27–31)
MCHC RBC-ENTMCNC: 32.1 G/DL — SIGNIFICANT CHANGE UP (ref 32–37)
MCV RBC AUTO: 93.2 FL — SIGNIFICANT CHANGE UP (ref 80–94)
MONOCYTES # BLD AUTO: 0.84 K/UL — HIGH (ref 0.1–0.6)
MONOCYTES NFR BLD AUTO: 11 % — HIGH (ref 1.7–9.3)
NEUTROPHILS # BLD AUTO: 4.75 K/UL — SIGNIFICANT CHANGE UP (ref 1.4–6.5)
NEUTROPHILS NFR BLD AUTO: 61.9 % — SIGNIFICANT CHANGE UP (ref 42.2–75.2)
NRBC # BLD: 0 /100 WBCS — SIGNIFICANT CHANGE UP (ref 0–0)
PLATELET # BLD AUTO: 195 K/UL — SIGNIFICANT CHANGE UP (ref 130–400)
PMV BLD: 10.2 FL — SIGNIFICANT CHANGE UP (ref 7.4–10.4)
POTASSIUM SERPL-MCNC: 4.6 MMOL/L — SIGNIFICANT CHANGE UP (ref 3.5–5)
POTASSIUM SERPL-SCNC: 4.6 MMOL/L — SIGNIFICANT CHANGE UP (ref 3.5–5)
PROT SERPL-MCNC: 6.2 G/DL — SIGNIFICANT CHANGE UP (ref 6–8)
RBC # BLD: 3.11 M/UL — LOW (ref 4.7–6.1)
RBC # FLD: 14.6 % — HIGH (ref 11.5–14.5)
SODIUM SERPL-SCNC: 137 MMOL/L — SIGNIFICANT CHANGE UP (ref 135–146)
WBC # BLD: 7.66 K/UL — SIGNIFICANT CHANGE UP (ref 4.8–10.8)
WBC # FLD AUTO: 7.66 K/UL — SIGNIFICANT CHANGE UP (ref 4.8–10.8)

## 2024-08-09 PROCEDURE — 99232 SBSQ HOSP IP/OBS MODERATE 35: CPT

## 2024-08-09 PROCEDURE — 93010 ELECTROCARDIOGRAM REPORT: CPT

## 2024-08-09 PROCEDURE — 71045 X-RAY EXAM CHEST 1 VIEW: CPT | Mod: 26

## 2024-08-09 RX ORDER — MIDODRINE HYDROCHLORIDE 2.5 MG/1
1 TABLET ORAL
Qty: 0 | Refills: 0 | DISCHARGE
Start: 2024-08-09

## 2024-08-09 RX ORDER — HEPARIN SODIUM 1000 [USP'U]/ML
3000 INJECTION, SOLUTION INTRAVENOUS; SUBCUTANEOUS
Qty: 0 | Refills: 0 | DISCHARGE
Start: 2024-08-09

## 2024-08-09 RX ORDER — CLOPIDOGREL BISULFATE 75 MG/1
1 TABLET, FILM COATED ORAL
Qty: 0 | Refills: 0 | DISCHARGE
Start: 2024-08-09

## 2024-08-09 RX ORDER — LORATADINE 10 MG
17 TABLET,DISINTEGRATING ORAL
Qty: 0 | Refills: 0 | DISCHARGE
Start: 2024-08-09

## 2024-08-09 RX ORDER — RANOLAZINE 1000 MG/1
1 TABLET, FILM COATED, EXTENDED RELEASE ORAL
Refills: 0 | DISCHARGE

## 2024-08-09 RX ORDER — METOPROLOL TARTRATE 100 MG
1 TABLET ORAL
Refills: 0 | DISCHARGE

## 2024-08-09 RX ORDER — FAMOTIDINE 40 MG/1
1 TABLET, FILM COATED ORAL
Qty: 0 | Refills: 0 | DISCHARGE
Start: 2024-08-09

## 2024-08-09 RX ORDER — ASPIRIN 500 MG
1 TABLET ORAL
Qty: 0 | Refills: 0 | DISCHARGE
Start: 2024-08-09

## 2024-08-09 RX ADMIN — FAMOTIDINE 20 MILLIGRAM(S): 40 TABLET, FILM COATED ORAL at 06:34

## 2024-08-09 RX ADMIN — Medication 81 MILLIGRAM(S): at 12:11

## 2024-08-09 RX ADMIN — MIDODRINE HYDROCHLORIDE 5 MILLIGRAM(S): 2.5 TABLET ORAL at 12:11

## 2024-08-09 RX ADMIN — MIDODRINE HYDROCHLORIDE 5 MILLIGRAM(S): 2.5 TABLET ORAL at 06:34

## 2024-08-09 RX ADMIN — Medication 200 GRAM(S): at 01:01

## 2024-08-09 RX ADMIN — CLOPIDOGREL BISULFATE 75 MILLIGRAM(S): 75 TABLET, FILM COATED ORAL at 12:11

## 2024-08-09 RX ADMIN — HEPARIN SODIUM 3000 UNIT(S): 1000 INJECTION, SOLUTION INTRAVENOUS; SUBCUTANEOUS at 06:34

## 2024-08-09 RX ADMIN — Medication 88 MICROGRAM(S): at 06:34

## 2024-08-09 RX ADMIN — CHLORHEXIDINE GLUCONATE 1 APPLICATION(S): 500 CLOTH TOPICAL at 06:35

## 2024-08-09 RX ADMIN — IPRATROPIUM BROMIDE AND ALBUTEROL SULFATE 3 MILLILITER(S): 2.5; .5 SOLUTION RESPIRATORY (INHALATION) at 08:12

## 2024-08-09 NOTE — PROGRESS NOTE ADULT - PROVIDER SPECIALTY LIST ADULT
CT Surgery
Critical Care
CT Surgery
Critical Care

## 2024-08-09 NOTE — PROGRESS NOTE ADULT - ASSESSMENT
70 y/o male, current smoker, w/ PMHx of HTN, HLD, hypothyroidism, BPH, CKD (baseline Cr 1.3-1.4), who presented to his cardiologist with c/o SOB on walking uphill and 1-2 flights of stairs, relieved with rest.    He underwent a CCTA on 7/12/24 which revealed a CA score of 722,   Cath showed need CABG    A/P    1- CAD  POD#1 CABGx4 ASA beta blocker and statins  2- HTN d/c NTG start lopressor 12.5 Q12  3- Anemia of acute blood loss monitor CBC daily  4- Thrombocytopenia monitor Plt daily  hx hypothyroidam continue synthroid po    d/c swan luz  ambulate   keep Chest tubes      
Assessment/Plan:  CAD-s/p CABG x 4-POD #2  1-BP control-beta-blockers  2-serum glucose control-insulin sliding scale correction regimen  3-fluid overload-diuresis  4-acute blood loss anemia-stable, monitor Hb/Hct daily  0-ispvylbxhrpjysgy-ehogrywfp, monitor plts daily  2-ndscepypfhagzqk-sdfxtorgu, monitor daily  7-HLD-statin  0-VXK-fpqlufkh tamsulosin  9-UTI-continue IV antibiotics  10-hypothyroid-continue synthroid    40 minutes of critical care services provided
Assessment/Plan:  CAD-s/p CABG x 4-POD #5  1-BP control-beta-blockers as tolerated  2-serum glucose control-insulin sliding scale correction regimen  3-fluid overload-hold diuresis  4-acute blood loss anemia-stable, monitor Hb/Hct daily  7-usqhujeiqldczihj-vsfevcuym, monitor plts daily  9-yjwwuavpwbalqyp-upgckxtnq, monitor daily  7-HLD-statin  7-WDR-tfnbkxsl tamsulosin  7-kjgwjuvmrsp-omazdwcy synthroid    40 minutes of critical care services provided
  70 y/o male, current smoker, w/ PMHx of HTN, HLD, hypothyroidism, BPH, CKD (baseline Cr 1.3-1.4), who presented to his cardiologist with c/o SOB on walking uphill and 1-2 flights of stairs, relieved with rest.    He underwent a CCTA on 7/12/24 which revealed a CA score of 722,   Cath showed need CABG    A/P    1- CAD  POD#1 CABGx4 ASA beta blocker and statinsPOD#3  2- HTN lopressor 12.5 Q12  3- Anemia of acute blood loss monitor CBC daily  4- Thrombocytopenia monitor Plt daily  hx hypothyroidam continue synthroid po  ambulate       
Assessment/Plan:  CAD-s/p CABG x 4-POD #7  1-BP soft not tolerating beta-blockers   midodrin needed to keep Bp >  2-serum glucose control-insulin sliding scale correction regimen  3-fluid overload-hold diuresis  4-acute blood loss anemia-stable, monitor Hb/Hct daily  5-bptgwlpiwuwsiano-vkpktvyni, monitor plts daily  6-syyfzlavmgyslck-vvtowhwjm, monitor daily  7-HLD-statin  8-KKO-pblpfzoe tamsulosin  0-kmkztvhbkfi-ewergpev synthroid    
CT surgery attending note    Patient seen and examined on rounds as described above    Postop day 1  Status post urgent coronary artery bypass grafting surgery  Doing very well  Denies any complaints    Extubated uneventfully just a couple of hours postoperatively yesterday evening  Hemodynamically stable  Neurologically intact    Overall the patient is making excellent recovery    The patient, the family and the care team updated regarding the plan and progress
CT surgery attending note    Patient seen and examined on rounds as described above along with the team    Postop day 4  Status post urgent coronary artery bypass grafting surgery  Doing very well  Hemodynamically stable  Ambulating the hallways    Denies any major chest pain or shortness of breath and says he is already feeling  better    Overall he is making an excellent recovery    Patient has some social issues and lives alone and has a daughter who lives in [facility].  The patient insists on being discharged home alone and we will have to be careful.  From a cardiac surgery point of view he is doing very well    The patient, the family and the care team updated regarding the plan and progress
CT surgery attending note    Patient seen and examined on rounds as described above along with the team    Postop day 6  Status post urgent coronary artery bypass grafting    Doing very well  Hemodynamically stable  Ambulating the hallways    Incisions are clean and dry  Sternum is stable  There was minimal serous drainage from the end of the sternotomy site  No evidence of infection    Overall the patient is making excellent progress    The patient lives at home alone and has a daughter in [place] as his only family and we are having the  team evaluate him for assistance at discharge as the patient appears to be clinically stable and very close to being discharged.

## 2024-08-09 NOTE — PROGRESS NOTE ADULT - SUBJECTIVE AND OBJECTIVE BOX
CTU Attending Progress Daily Note     09 Aug 2024 12:10  POD# - 7  He has history of Hypothyroidism, unspecified type    High cholesterol    HTN (hypertension)    BPH (benign prostatic hyperplasia)    Chronic kidney disease (CKD)      Interval event for past 24 hr:  FLAVIA TOVAR  69y had no event.   Current Complains:  FLAVIA TOVAR has no new complains  HPI:    70 y/o male, current smoker, w/ PMHx of HTN, HLD, hypothyroidism, BPH, CKD (baseline Cr 1.3-1.4), who presented to his cardiologist with c/o SOB on walking uphill and 1-2 flights of stairs, relieved with rest.  Pt also c/o occasional dizziness with postural changes.  He underwent a CCTA on 7/12/24 which revealed a CA score of 722, short segment mixed predominantly dense calcified plaque proximal LAD resulting in moderate to severe narrowing, mixed calcified and noncalcified plaque proximal LCx resulting in moderate to severe narrowing, and mixed predominantly dense calcified plaque proximal and mid RCA resulting in moderate narrowing.  CT FFR 7/25/24 -> LAD 0.59, LCx , OM 0.82, RCA 0.82.   Patient presents today for [facility] with possible intervention if clinically indicated.    Pre cath note:    indication:  [ ] STEMI                [ ] NSTEMI                 [ ] Acute coronary syndrome                     [ ]Unstable Angina   [ ] high risk  [ ] intermediate risk  [ ] low risk                     [ x] Stable Angina     non-invasive testing:    CCTA    Date:    7/12/24    result: [ ] high risk  [x ] intermediate risk  [ ] low risk    Anti- Anginal medications:                    [ ] not used                       [x ] used :   on BBn and Ranexa              [ ] not used but strong indication not to use    Ejection Fraction                   [ ] <29            [ ] 30-39%   [ ] 40-49%     [ ]>50%    CHF                   [ ] active (within last 14 days on meds   [ ] Chronic (on meds but no exacerbation)    COPD                   [ ] mild (on chronic bronchodilators)  [ ] moderate (on chronic steroid therapy)      [ ] severe (indication for home O2 or PACO2 >50)    Other risk factors:                       [ ] Previous MI                     [ ] CVA/ stroke                    [ ] carotid stent/ CEA                    [ ] PVD/PAD- (arterial aneurysm, non-palpable pulses, tortuous vessel with inability to insert catheter, infra-renal dissection, renal or subclavian artery stenosis)                    [ ] diabetic                    [ ] previous CABG                    [x ] Renal Failure       Right Filippo Test: positive    Adjusted Cath Bleeding Risk: 1.5%    Pre-cath Hydration: (x  )  cc IV bolus x 1 over 1 hr followed by ( x ) NS @ 100cc/hr until procedure                                                                                                                             (28 Jul 2024 14:10)    OBJECTIVE:  ICU Vital Signs Last 24 Hrs  T(C): 37.3 (09 Aug 2024 08:00), Max: 37.6 (08 Aug 2024 20:00)  T(F): 99.1 (09 Aug 2024 08:00), Max: 99.7 (08 Aug 2024 20:00)  HR: 79 (09 Aug 2024 11:00) (70 - 92)  BP: 96/57 (09 Aug 2024 11:00) (89/59 - 106/61)  BP(mean): 69 (09 Aug 2024 11:00) (66 - 78)  ABP: --  ABP(mean): --  RR: 21 (09 Aug 2024 11:00) (16 - 33)  SpO2: 95% (09 Aug 2024 11:00) (91% - 100%)    O2 Parameters below as of 09 Aug 2024 09:00  Patient On (Oxygen Delivery Method): room air          I&O's Summary    08 Aug 2024 07:01  -  09 Aug 2024 07:00  --------------------------------------------------------  IN: 1040 mL / OUT: 1250 mL / NET: -210 mL      I&O's Detail    08 Aug 2024 07:01  -  09 Aug 2024 07:00  --------------------------------------------------------  IN:    IV PiggyBack: 200 mL    Oral Fluid: 840 mL  Total IN: 1040 mL    OUT:    Voided (mL): 1250 mL  Total OUT: 1250 mL    Total NET: -210 mL        Adult Advanced Hemodynamics Last 24 Hrs  CVP(mm Hg): --  CVP(cm H2O): --  CO: --  CI: --  PA: --  PA(mean): --  PCWP: --  SVR: --  SVRI: --  PVR: --  PVRI: --    CAPILLARY BLOOD GLUCOSE        LABS:                          9.3    7.66  )-----------( 195      ( 09 Aug 2024 01:37 )             29.0     08-09    137  |  101  |  16  ----------------------------<  99  4.6   |  25  |  1.0    Ca    8.4      09 Aug 2024 01:37  Mg     2.2     08-09    TPro  6.2  /  Alb  3.8  /  TBili  0.4  /  DBili  x   /  AST  29  /  ALT  31  /  AlkPhos  60  08-09      Urinalysis Basic - ( 09 Aug 2024 01:37 )    Color: x / Appearance: x / SG: x / pH: x  Gluc: 99 mg/dL / Ketone: x  / Bili: x / Urobili: x   Blood: x / Protein: x / Nitrite: x   Leuk Esterase: x / RBC: x / WBC x   Sq Epi: x / Non Sq Epi: x / Bacteria: x        Home Medications:  aspirin 81 mg oral delayed release tablet: 1 tab(s) orally once a day (09 Aug 2024 11:41)  Augmentin 875 mg-125 mg oral tablet: 1 tab(s) orally 2 times a day (09 Aug 2024 11:41)  clopidogrel 75 mg oral tablet: 1 tab(s) orally once a day (09 Aug 2024 11:41)  famotidine 20 mg oral tablet: 1 tab(s) orally 2 times a day (09 Aug 2024 11:41)  Flomax 0.4 mg oral capsule: 1 cap(s) orally once a day (31 Jul 2024 17:17)  heparin: 3,000 unit(s) subcutaneous 2 times a day (09 Aug 2024 11:41)  levothyroxine 88 mcg (0.088 mg) oral tablet: 1 tab(s) orally once a day (31 Jul 2024 17:17)  midodrine 5 mg oral tablet: 1 tab(s) orally 2 times a day (09 Aug 2024 12:07)  polyethylene glycol 3350 oral powder for reconstitution: 17 gram(s) orally once a day (09 Aug 2024 11:41)  rosuvastatin 20 mg oral tablet: 1 tab(s) orally once a day (at bedtime) (31 Jul 2024 17:17)    HOSPITAL MEDICATIONS:  MEDICATIONS  (STANDING):  albuterol/ipratropium for Nebulization 3 milliLiter(s) Nebulizer every 6 hours  ampicillin/sulbactam  IVPB 3 Gram(s) IV Intermittent every 6 hours  ampicillin/sulbactam  IVPB      aspirin enteric coated 81 milliGRAM(s) Oral daily  atorvastatin 80 milliGRAM(s) Oral at bedtime  bisacodyl Suppository 10 milliGRAM(s) Rectal once  chlorhexidine 2% Cloths 1 Application(s) Topical <User Schedule>  clopidogrel Tablet 75 milliGRAM(s) Oral daily  dextrose 5%. 1000 milliLiter(s) (50 mL/Hr) IV Continuous <Continuous>  dextrose 5%. 1000 milliLiter(s) (100 mL/Hr) IV Continuous <Continuous>  famotidine    Tablet 20 milliGRAM(s) Oral two times a day  glucagon  Injectable 1 milliGRAM(s) IntraMuscular once  heparin   Injectable 3000 Unit(s) SubCutaneous every 12 hours  levothyroxine 88 MICROGram(s) Oral daily  metoprolol tartrate 12.5 milliGRAM(s) Oral daily  midodrine. 5 milliGRAM(s) Oral three times a day  polyethylene glycol 3350 17 Gram(s) Oral daily  senna 2 Tablet(s) Oral at bedtime  tamsulosin 0.4 milliGRAM(s) Oral at bedtime    MEDICATIONS  (PRN):  melatonin 5 milliGRAM(s) Oral at bedtime PRN Sleep  ondansetron Injectable 4 milliGRAM(s) IV Push every 6 hours PRN Nausea and/or Vomiting  oxyCODONE    IR 5 milliGRAM(s) Oral every 4 hours PRN Moderate Pain (4 - 6)  oxyCODONE    IR 10 milliGRAM(s) Oral every 4 hours PRN Severe Pain (7 - 10)      REVIEW OF SYSTEMS:  CONSTITUTIONAL: [X] all negative; [ ] weakness, [ ] fevers, [ ] chills  EYES/ENT: [X] all negative; [ ] visual changes, [ ] vertigo, [ ] throat pain   NECK: [X] all negative; [ ] pain, [ ] stiffness  RESPIRATORY: [] all negative, [ ] cough, [ ] wheezing, [ ] hemoptysis, [ ] shortness of breath  CARDIOVASCULAR: [] all negative; [ ] chest pain, [ ] palpitations, [ ] orthopnea  GASTROINTESTINAL: [X] all negative; [ ]abdominal pain, [ ] nausea, [ ] vomiting, [ ] hematemesis, [ ] diarrhea, [ ] constipation, [ ] melena, [ ] hematochezia.  GENITOURINARY: [X] all negative; [ ] dysuria, [ ] frequency, [ ] hematuria  NEUROLOGICAL: [X] all negative; [ ] numbness, [ ] weakness  SKIN: [X] all negative; [ ] itching, [ ] burning, [ ] rashes, [ ] lesions   All other review of systems is negative unless indicated above.    [  ] Unable to assess ROS because     PHYSICAL EXAM:          CONSTITUTIONAL: Well-developed; well-nourished; in no acute distress.   	SKIN: warm, dry  	HEAD: Normocephalic; atraumatic.  	EYES: PERRL, EOM, no conj injection, sclera clear  	ENT: No nasal discharge; airway clear.  	NECK: Supple; non tender.  No midline ttp ctls  	CARD: S1, S2 normal; no murmurs, gallops, or rubs. Regular rate and rhythm. 2+ RPs and DPs bilat, no carotid bruits, no pedal   edema, no calf pain b/l  	RESP: CTA  bilat good air movement No wheezes, rales or rhonchi.  	ABD: Soft, not tender, not distended, no CVA ttp no rebound or guarding, bowel sounds present  	EXT: Normal ROM.  No clubbing, cyanosis or edema.   	  	NEURO: Alert, awake, motor 5/5 R, 5/5 L        RADIOLOGY:  xray  < from: Xray Chest 1 View- PORTABLE-Routine (Xray Chest 1 View- PORTABLE-Routine in AM.) (08.09.24 @ 05:22) >  Impression:    Stable bibasilar effusions.    --- End of Report ---        < end of copied text >    I spent 45 minutes of critical care time ; more than 50% of visit was spent counseling and/or examining patient, reviewing vitals, labs, medications, imaging and discussing with the team goals of care to prevent life-threatening in this patient who is at high risk for deterioration or death due to:

## 2024-08-10 ENCOUNTER — TRANSCRIPTION ENCOUNTER (OUTPATIENT)
Age: 69
End: 2024-08-10

## 2024-08-12 ENCOUNTER — NON-APPOINTMENT (OUTPATIENT)
Age: 69
End: 2024-08-12

## 2024-08-14 ENCOUNTER — TRANSCRIPTION ENCOUNTER (OUTPATIENT)
Age: 69
End: 2024-08-14

## 2024-08-15 DIAGNOSIS — E87.70 FLUID OVERLOAD, UNSPECIFIED: ICD-10-CM

## 2024-08-15 DIAGNOSIS — E03.9 HYPOTHYROIDISM, UNSPECIFIED: ICD-10-CM

## 2024-08-15 DIAGNOSIS — I12.9 HYPERTENSIVE CHRONIC KIDNEY DISEASE WITH STAGE 1 THROUGH STAGE 4 CHRONIC KIDNEY DISEASE, OR UNSPECIFIED CHRONIC KIDNEY DISEASE: ICD-10-CM

## 2024-08-15 DIAGNOSIS — I95.9 HYPOTENSION, UNSPECIFIED: ICD-10-CM

## 2024-08-15 DIAGNOSIS — D69.6 THROMBOCYTOPENIA, UNSPECIFIED: ICD-10-CM

## 2024-08-15 DIAGNOSIS — I25.110 ATHEROSCLEROTIC HEART DISEASE OF NATIVE CORONARY ARTERY WITH UNSTABLE ANGINA PECTORIS: ICD-10-CM

## 2024-08-15 DIAGNOSIS — F17.200 NICOTINE DEPENDENCE, UNSPECIFIED, UNCOMPLICATED: ICD-10-CM

## 2024-08-15 DIAGNOSIS — Z79.890 HORMONE REPLACEMENT THERAPY: ICD-10-CM

## 2024-08-15 DIAGNOSIS — D62 ACUTE POSTHEMORRHAGIC ANEMIA: ICD-10-CM

## 2024-08-15 DIAGNOSIS — N39.0 URINARY TRACT INFECTION, SITE NOT SPECIFIED: ICD-10-CM

## 2024-08-15 DIAGNOSIS — E78.00 PURE HYPERCHOLESTEROLEMIA, UNSPECIFIED: ICD-10-CM

## 2024-08-15 DIAGNOSIS — N40.0 BENIGN PROSTATIC HYPERPLASIA WITHOUT LOWER URINARY TRACT SYMPTOMS: ICD-10-CM

## 2024-08-15 DIAGNOSIS — R74.01 ELEVATION OF LEVELS OF LIVER TRANSAMINASE LEVELS: ICD-10-CM

## 2024-08-15 DIAGNOSIS — I25.82 CHRONIC TOTAL OCCLUSION OF CORONARY ARTERY: ICD-10-CM

## 2024-08-15 DIAGNOSIS — N18.9 CHRONIC KIDNEY DISEASE, UNSPECIFIED: ICD-10-CM

## 2024-08-15 DIAGNOSIS — J43.9 EMPHYSEMA, UNSPECIFIED: ICD-10-CM

## 2024-08-15 DIAGNOSIS — I25.84 CORONARY ATHEROSCLEROSIS DUE TO CALCIFIED CORONARY LESION: ICD-10-CM

## 2024-08-16 ENCOUNTER — TRANSCRIPTION ENCOUNTER (OUTPATIENT)
Age: 69
End: 2024-08-16

## 2024-08-19 ENCOUNTER — APPOINTMENT (OUTPATIENT)
Dept: CARDIOTHORACIC SURGERY | Facility: CLINIC | Age: 69
End: 2024-08-19
Payer: COMMERCIAL

## 2024-08-19 VITALS
BODY MASS INDEX: 28.25 KG/M2 | HEIGHT: 67 IN | SYSTOLIC BLOOD PRESSURE: 105 MMHG | OXYGEN SATURATION: 95 % | HEART RATE: 83 BPM | WEIGHT: 180 LBS | DIASTOLIC BLOOD PRESSURE: 61 MMHG | RESPIRATION RATE: 12 BRPM | TEMPERATURE: 98.8 F

## 2024-08-19 DIAGNOSIS — Z95.1 PRESENCE OF AORTOCORONARY BYPASS GRAFT: ICD-10-CM

## 2024-08-19 PROBLEM — I10 ESSENTIAL (PRIMARY) HYPERTENSION: Chronic | Status: ACTIVE | Noted: 2024-07-31

## 2024-08-19 PROCEDURE — 99024 POSTOP FOLLOW-UP VISIT: CPT

## 2024-08-19 RX ORDER — ASPIRIN 81 MG/1
81 TABLET, CHEWABLE ORAL
Refills: 0 | Status: ACTIVE | COMMUNITY

## 2024-08-19 NOTE — COUNSELING
[Hygeine (Including Daily Shower)] : hygeine (including daily shower) [Importance of Regular Medical Follow-Up] : the importance of regular medical follow-up [No Heavy Lifting] : no heavy lifting (>15-20 lb. for 1 month or 25 lb. for 3 months from date of surgery) [Blood Pressure Control] : blood pressure control [S/S of infection] : signs and symptoms of infection (and to whom it should be reported) [Progressive Ambulation/Activity] : progressive ambulation/activity [Medication/Vitamin/Herb/Food Interaction] : medication/vitamin/herb/food interaction [FreeTextEntry1] : FLAVIA TOVAR is a 69 year M status post CABG. This is a follow up visit with the patient. During the post op visit all cardiac and sternal precautions were reviewed with the patient. Incision care was reviewed. Education regarding nutrition was also reviewed with the patient, to maintain a low salt diet. On arrival patient denies fever, chills nausea, and vomiting. Denies SOB or palpitation. All questions and concerns were addressed. Patient educated on diet and salt restrictions. Medications were reviewed with the patient, and education for cardiac surgery patient must continue aspirin, and statin post-operatively until re-evaluated by patients cardiologist. Patient was instructed to follow up with their cardiologist as appropriate for long term management.

## 2024-08-19 NOTE — REASON FOR VISIT
[Formal Caregiver] : formal caregiver [de-identified] : s/pCABG [de-identified] : 8/2/2024 [de-identified] : MR. Mckeon is a 68 y/o M that arrives today for a post op visit. Offers no complaints. The patient is a 69-year-old gentleman who underwent urgent coronary artery bypass grafting x 4 for unstable angina with critical triple-vessel coronary artery disease with bullous emphysema and chronic kidney disease with multiple other medical issues.  The patient has been a lifelong smoker and he was admitted following his cardiac catheterization with heavily calcified triple-vessel coronary artery disease.  He did very well in the postoperative.  And was discharged on postoperative day 5.  The patient himself lives alone and has no family in the area and his main healthcare proxy was his daughter, Elayne who lives in [place].  He did not have any visitors during his admission.  He was discharged to the rehabilitation facility and tells me that he continues to do very well. He denies any chest pain or shortness of breath  Unfortunately he has gone back to smoking and says that he smokes less than 10 cigarettes a day.

## 2024-08-19 NOTE — ASSESSMENT
[FreeTextEntry1] : Mr. Mckeon is a 69 year-old current lifelong smoker with a past medical history of HTN, HLD. hypothyroidism, BPH, CKD (baseline Cr 1.3-1.4). He was admitted for elective cardiac cath which showed multivessel CAD.  On 08/02/24, he underwent surgical myocardial revascularization. Post-operatively, patient tolerated the procedure well. Patient's hospitalization was prolonged due to symptomatic hypotension. Patient's SBP went as low as 80s, BB and other anti-hypertensives held. Patient started on PO midodrine. Patient was discharged on 8/9 to [facility] in stable condition with instructions to follow up Dr. Earl on 8/19. He arrives today for a post op visit. Offers no complaints. Doing well at the [facility]. On all medication from Hospital. He returned to smoking as, per Joseph the aid, he is smoking 5 -10cigarettes a day.  Plan: #CAD s/p CABG X4 Continue DAPT, Stay off beta blocker, as patient has borderline blood pressures Needs Labs today  #Hypotention Continue on Midodrine Stay off Beta blocker  Complete 10 days of ABX No sternal Drainage noted today F/U with CTS 2 weeks Labs at facility with plan to fax to office  Follow-up with his primary care physician and his cardiologist  We also spoke with his daughter over the telephone, Elayne who is in [place] who tells me that she will try and keep an eye out on her father and encouraged him to stop smoking. Also discussed healthy lifestyle choices including diet exercise etc. Overall from a cardiac surgery point of view the patient continues to make excellent progress

## 2024-08-19 NOTE — PHYSICAL EXAM
[] : no respiratory distress [Auscultation Breath Sounds / Voice Sounds] : lungs were clear to auscultation bilaterally [Heart Rate And Rhythm] : heart rate was normal and rhythm regular [Heart Sounds] : normal S1 and S2 [Heart Sounds Gallop] : no gallops [Murmurs] : no murmurs [Heart Sounds Pericardial Friction Rub] : no pericardial rub [No Edema] : no edema [FreeTextEntry1] : Sternum is stable and healing well [Clean] : clean [Dry] : dry [Healing Well] : healing well [FreeTextEntry5] : No edema and incisions are healing well

## 2024-09-03 ENCOUNTER — APPOINTMENT (OUTPATIENT)
Dept: NEUROLOGY | Facility: CLINIC | Age: 69
End: 2024-09-03
Payer: COMMERCIAL

## 2024-09-03 PROCEDURE — 99214 OFFICE O/P EST MOD 30 MIN: CPT

## 2024-09-03 NOTE — HISTORY OF PRESENT ILLNESS
[FreeTextEntry1] : Mr. Mckeon is a 69-year-old male presenting to the office for a follow up for history of dizziness and orthostatic/postural hypotension. He did see cardiology and the blood pressures in cardiology were not as low as they had been here, but they were 108 systolic and here they were 92 to 97 systolic. The patient is still complaining of dizziness. Since the last visit, he had 3-vessel coronary artery bypass surgery and was found to have hypotension by cardiology. He was started on midodrine 5 mg BID. Since he's been taking the medication, his dizziness has improved. His MRI of the brain showed extensive microvascular ischemic changes. He is still smoking cigarettes, has hypotension and hypertension, and has elevated cholesterol. He also has ostensive opacification of the left maxillary sinus.

## 2024-09-03 NOTE — ASSESSMENT
[FreeTextEntry1] : Impression is that of cerebrovascular disease, sinusitis, and CAD. Patient was referred to ENT to evaluate the sinuses and see if he needs drainage. His blood pressure is normalized on the midodrine, and his dizziness has improved. I encouraged he stop smoking as this also contributes to cerebrovascular ischemic changes in the brain. Patient does not need to be seen here in follow up unless he should have new or worsening symptoms.   Entered by Judith Dickerson acting as scribe for Dr. Bryant.   The documentation recorded by the scribe, in my presence, accurately reflects the service I personally performed, and the decisions made by me with my edits as appropriate. Silvio Bryant MD, FAAN, FACP Diplomate American Board of Psychiatry & Neurology.

## 2024-09-09 ENCOUNTER — APPOINTMENT (OUTPATIENT)
Dept: CARDIOTHORACIC SURGERY | Facility: CLINIC | Age: 69
End: 2024-09-09
Payer: COMMERCIAL

## 2024-09-09 VITALS
HEIGHT: 67 IN | HEART RATE: 75 BPM | OXYGEN SATURATION: 98 % | DIASTOLIC BLOOD PRESSURE: 74 MMHG | RESPIRATION RATE: 12 BRPM | TEMPERATURE: 98.1 F | SYSTOLIC BLOOD PRESSURE: 115 MMHG

## 2024-09-09 DIAGNOSIS — Z95.1 PRESENCE OF AORTOCORONARY BYPASS GRAFT: ICD-10-CM

## 2024-09-09 DIAGNOSIS — N18.31 CHRONIC KIDNEY DISEASE, STAGE 3A: ICD-10-CM

## 2024-09-09 PROCEDURE — 99024 POSTOP FOLLOW-UP VISIT: CPT

## 2024-09-09 NOTE — REASON FOR VISIT
[Formal Caregiver] : formal caregiver [de-identified] : s/pCABG [de-identified] : 8/2/2024 [de-identified] : MR. Mckeon is a 68 y/o M that arrives today for a post op visit. Offers no complaints. The patient is a 69-year-old gentleman who underwent urgent coronary artery bypass grafting x 4 for unstable angina with critical triple-vessel coronary artery disease with bullous emphysema and chronic kidney disease with multiple other medical issues.  The patient has been a lifelong smoker and he was admitted following his cardiac catheterization with heavily calcified triple-vessel coronary artery disease.  He did very well in the postoperative.  And was discharged on postoperative day 5.  The patient himself lives alone and has no family in the area and his main healthcare proxy was his daughter, Elayne who lives in [place].  He did not have any visitors during his admission.  He was discharged to the rehabilitation facility and tells me that he continues to do very well. He denies any chest pain or shortness of breath  Unfortunately he has gone back to smoking and says that he smokes less than 10 cigarettes a day.

## 2024-09-09 NOTE — ASSESSMENT
[FreeTextEntry1] : Mr. Mckeon is a 69 year-old current lifelong smoker with a past medical history of HTN, HLD. hypothyroidism, BPH, CKD (baseline Cr 1.3-1.4). He was admitted for elective cardiac cath which showed multivessel CAD.  On 08/02/24, he underwent surgical myocardial revascularization. Post-operatively, patient tolerated the procedure well. Patient's hospitalization was prolonged due to symptomatic hypotension. Patient's SBP went as low as 80s, BB and other anti-hypertensives held. Patient started on PO midodrine. Patient was discharged on 8/9 to [facility] in stable condition with instructions to follow up Dr. Earl. He arrives today for a post op visit. Offers no complaints. Doing well at the [facility]. On all medication from Hospital. He returned to smoking as, per Joseph the aid, he is smoking 5 -10cigarettes a day. He continues to live at [facility] and does not have a real plan as to where he will go next as he did not have a stable living environment and he has no family in the area.  From a cardiac surgery point of view the patient has made an excellent recovery but unfortunately continues to smoke. He says he will go back to his primary care physicians and his cardiologist and I explained to him the importance of this regular medical follow-up to get the maximum benefit from this surgery.   Plan: #CAD s/p CABG X4 Continue DAPT, Labs stable Creatinine 1.2 H and H 10/33 Normal WBC  #Hypotension Resolved off beta blocker  No sternal Drainage noted today F/U with CTS prn F/U with Dr Heard F/U with Dr. Gaytan

## 2024-09-09 NOTE — PHYSICAL EXAM
[] : no respiratory distress [Auscultation Breath Sounds / Voice Sounds] : lungs were clear to auscultation bilaterally [Heart Rate And Rhythm] : heart rate was normal and rhythm regular [Heart Sounds] : normal S1 and S2 [Heart Sounds Gallop] : no gallops [Murmurs] : no murmurs [Heart Sounds Pericardial Friction Rub] : no pericardial rub [Clean] : clean [Dry] : dry [Healing Well] : healing well [No Edema] : no edema [FreeTextEntry1] : Sternum is stable and healing well, Almost completely healed by now [FreeTextEntry5] : No edema and incisions are healing well

## 2024-09-17 NOTE — ASU PATIENT PROFILE, ADULT - PROVIDER NOTIFICATION
Nicotinamide Supplementation Recommendations: Take 500 mg of nicotinamide by mouth twice daily. Sunscreen Recommendations: Regularly apply at least an SPF 30 broad spectrum sunscreen while outdoors during the day. Detail Level: Detailed Detail Level: Generalized Patient Specific Counseling (Will Not Stick From Patient To Patient): Lesion of concern on the right abdomen is consistent with a benign nevus.  Declines

## 2024-10-02 ENCOUNTER — NON-APPOINTMENT (OUTPATIENT)
Age: 69
End: 2024-10-02

## 2024-10-03 ENCOUNTER — APPOINTMENT (OUTPATIENT)
Dept: CARDIOLOGY | Facility: CLINIC | Age: 69
End: 2024-10-03
Payer: COMMERCIAL

## 2024-10-03 VITALS
HEART RATE: 67 BPM | OXYGEN SATURATION: 95 % | SYSTOLIC BLOOD PRESSURE: 100 MMHG | BODY MASS INDEX: 26.84 KG/M2 | WEIGHT: 171 LBS | HEIGHT: 67 IN | DIASTOLIC BLOOD PRESSURE: 70 MMHG

## 2024-10-03 DIAGNOSIS — Z95.1 PRESENCE OF AORTOCORONARY BYPASS GRAFT: ICD-10-CM

## 2024-10-03 DIAGNOSIS — I25.10 ATHEROSCLEROTIC HEART DISEASE OF NATIVE CORONARY ARTERY W/OUT ANGINA PECTORIS: ICD-10-CM

## 2024-10-03 PROCEDURE — 99406 BEHAV CHNG SMOKING 3-10 MIN: CPT

## 2024-10-03 PROCEDURE — 99214 OFFICE O/P EST MOD 30 MIN: CPT | Mod: 25

## 2024-10-03 PROCEDURE — 93000 ELECTROCARDIOGRAM COMPLETE: CPT

## 2024-10-03 RX ORDER — TAMSULOSIN HYDROCHLORIDE 0.4 MG/1
0.4 CAPSULE ORAL
Refills: 0 | Status: ACTIVE | COMMUNITY

## 2024-10-03 RX ORDER — CLOPIDOGREL 75 MG/1
75 TABLET, FILM COATED ORAL
Refills: 0 | Status: ACTIVE | COMMUNITY

## 2024-10-03 RX ORDER — MIDODRINE HYDROCHLORIDE 5 MG/1
5 TABLET ORAL
Refills: 0 | Status: ACTIVE | COMMUNITY

## 2024-10-03 RX ORDER — FAMOTIDINE 40 MG/1
40 TABLET, FILM COATED ORAL
Refills: 0 | Status: ACTIVE | COMMUNITY

## 2024-10-03 RX ORDER — LEVOTHYROXINE SODIUM 0.1 MG/1
100 TABLET ORAL
Refills: 0 | Status: ACTIVE | COMMUNITY

## 2024-10-03 RX ORDER — RANOLAZINE 500 MG/1
500 TABLET, EXTENDED RELEASE ORAL
Refills: 0 | Status: ACTIVE | COMMUNITY

## 2024-10-03 RX ORDER — DOCUSATE SODIUM 100 MG
100 TABLET ORAL
Refills: 0 | Status: ACTIVE | COMMUNITY

## 2024-10-03 RX ORDER — POLYETHYLENE GLYCOL 3350 17 G/17G
17 POWDER, FOR SOLUTION ORAL
Refills: 0 | Status: ACTIVE | COMMUNITY

## 2024-10-04 NOTE — DISCUSSION/SUMMARY
[EKG obtained to assist in diagnosis and management of assessed problem(s)] : EKG obtained to assist in diagnosis and management of assessed problem(s) [FreeTextEntry1] : EKG performed today was unremarkable.  Medications reviewed in detail with the patient.   CAD s/p CABG: The impression is coronary artery disease. Patient is doing well. Postop TTE revealed normal LV function. There are no changes in medication management. Continue current medical therapy. Other planned treatment includes dietary modification, an exercise regimen, and weight reduction. Recommend lipid profile in 1 month to assess for LDL goal.   Nicotine Dependence: Spoke with the patient for approximately 5 minutes regarding cessation of smoking. Counselled patient on current 10 cigs/day dependence and the effects of smoking on the patient's health/comorbidities, family and friends (emotional and secondhand smoke), and finances. Options for pharmacotherapy given, including nicotine patches and nicotine gum. Patient also informed of options to call 7-577-LZvirocyt, text 917-342-1160, and visit www."[employer] for further information, an online quit , or [employer] (6-week step-by-step text messaging program). Patient mentions unsuccessful attempts to quit smoking in the past. They are not willing to attempt to quit.  Instructed to follow up in 3 months.  Plan was discussed with the patient.

## 2024-10-04 NOTE — CARDIOLOGY SUMMARY
[de-identified] : 10/03/2024: NSR, normal axis, normal intervals, (-) ST-T wave changes. ======================================================= [de-identified] : TTE - 08/07/2024: Summary:  1. Normal global left ventricular systolic function.  2. Mid and apical anterior wall is abnormal as described above.  3. LV Ejection Fraction by Kim's Method with a biplane EF of 55 %.  4. Mildly reduced RV systolic function.  5. Normal left atrial size.  6. Normal right atrial size.  7. There is no evidence of pericardial effusion. =======================================================

## 2024-10-04 NOTE — CARDIOLOGY SUMMARY
[de-identified] : 10/03/2024: NSR, normal axis, normal intervals, (-) ST-T wave changes. ======================================================= [de-identified] : TTE - 08/07/2024: Summary:  1. Normal global left ventricular systolic function.  2. Mid and apical anterior wall is abnormal as described above.  3. LV Ejection Fraction by Kim's Method with a biplane EF of 55 %.  4. Mildly reduced RV systolic function.  5. Normal left atrial size.  6. Normal right atrial size.  7. There is no evidence of pericardial effusion. =======================================================

## 2024-10-04 NOTE — HISTORY OF PRESENT ILLNESS
[FreeTextEntry1] : Of note, FLAVIA TOVAR is a patient of Dr. Guerrero.   FLAVIA TOVAR is a 69-year-old male, with a PMHx significant for CAD s/p CABG x4 in 7/2024, hypothyroidism, COPD, CKD, and h/o orthostatic hypotension, who presents today for a follow-up visit. Patient was in rehab for a few weeks. Denies any new complaints. Prior echo in August revealed normal LV function. Otherwise: (-) chest pain, (-) SOB.  Social History: (+) Smoking: 10 cigs/day.

## 2024-10-04 NOTE — HISTORY OF PRESENT ILLNESS
[FreeTextEntry1] : Of note, FLAVIA TOVAR is a patient of Dr. Geurrero.   FLAVIA TOVAR is a 69-year-old male, with a PMHx significant for CAD s/p CABG x4 in 7/2024, hypothyroidism, COPD, CKD, and h/o orthostatic hypotension, who presents today for a follow-up visit. Patient was in rehab for a few weeks. Denies any new complaints. Prior echo in August revealed normal LV function. Otherwise: (-) chest pain, (-) SOB.  Social History: (+) Smoking: 10 cigs/day.

## 2024-10-04 NOTE — DISCUSSION/SUMMARY
[EKG obtained to assist in diagnosis and management of assessed problem(s)] : EKG obtained to assist in diagnosis and management of assessed problem(s) [FreeTextEntry1] : EKG performed today was unremarkable.  Medications reviewed in detail with the patient.   CAD s/p CABG: The impression is coronary artery disease. Patient is doing well. Postop TTE revealed normal LV function. There are no changes in medication management. Continue current medical therapy. Other planned treatment includes dietary modification, an exercise regimen, and weight reduction. Recommend lipid profile in 1 month to assess for LDL goal.   Nicotine Dependence: Spoke with the patient for approximately 5 minutes regarding cessation of smoking. Counselled patient on current 10 cigs/day dependence and the effects of smoking on the patient's health/comorbidities, family and friends (emotional and secondhand smoke), and finances. Options for pharmacotherapy given, including nicotine patches and nicotine gum. Patient also informed of options to call 6-632-OZShoppilot, text 908-202-8848, and visit www.[employer] for further information, an online quit , or [employer] (6-week step-by-step text messaging program). Patient mentions unsuccessful attempts to quit smoking in the past. They are not willing to attempt to quit.  Instructed to follow up in 3 months.  Plan was discussed with the patient.

## 2025-01-03 ENCOUNTER — APPOINTMENT (OUTPATIENT)
Dept: CARDIOLOGY | Facility: CLINIC | Age: 70
End: 2025-01-03
Payer: COMMERCIAL

## 2025-01-03 ENCOUNTER — LABORATORY RESULT (OUTPATIENT)
Age: 70
End: 2025-01-03

## 2025-01-03 VITALS
DIASTOLIC BLOOD PRESSURE: 80 MMHG | HEIGHT: 67 IN | SYSTOLIC BLOOD PRESSURE: 118 MMHG | HEART RATE: 62 BPM | WEIGHT: 179 LBS | BODY MASS INDEX: 28.09 KG/M2 | OXYGEN SATURATION: 98 %

## 2025-01-03 DIAGNOSIS — E66.3 OVERWEIGHT: ICD-10-CM

## 2025-01-03 DIAGNOSIS — E78.2 MIXED HYPERLIPIDEMIA: ICD-10-CM

## 2025-01-03 DIAGNOSIS — I25.10 ATHEROSCLEROTIC HEART DISEASE OF NATIVE CORONARY ARTERY W/OUT ANGINA PECTORIS: ICD-10-CM

## 2025-01-03 DIAGNOSIS — Z95.1 PRESENCE OF AORTOCORONARY BYPASS GRAFT: ICD-10-CM

## 2025-01-03 DIAGNOSIS — F17.200 NICOTINE DEPENDENCE, UNSPECIFIED, UNCOMPLICATED: ICD-10-CM

## 2025-01-03 PROCEDURE — 99214 OFFICE O/P EST MOD 30 MIN: CPT

## 2025-01-03 PROCEDURE — G2211 COMPLEX E/M VISIT ADD ON: CPT | Mod: NC

## 2025-01-03 PROCEDURE — 93000 ELECTROCARDIOGRAM COMPLETE: CPT

## 2025-01-06 ENCOUNTER — NON-APPOINTMENT (OUTPATIENT)
Age: 70
End: 2025-01-06

## 2025-01-06 LAB
ALBUMIN SERPL ELPH-MCNC: 4.6 G/DL
ALP BLD-CCNC: 72 U/L
ALT SERPL-CCNC: 15 U/L
ANION GAP SERPL CALC-SCNC: 11 MMOL/L
APPEARANCE: CLEAR
AST SERPL-CCNC: 17 U/L
BILIRUB SERPL-MCNC: 0.4 MG/DL
BILIRUBIN URINE: NEGATIVE
BLOOD URINE: ABNORMAL
BUN SERPL-MCNC: 24 MG/DL
CALCIUM SERPL-MCNC: 9.3 MG/DL
CHLORIDE SERPL-SCNC: 107 MMOL/L
CHOLEST SERPL-MCNC: 143 MG/DL
CO2 SERPL-SCNC: 23 MMOL/L
COLOR: NORMAL
CREAT SERPL-MCNC: 1.5 MG/DL
EGFR: 51 ML/MIN/1.73M2
ESTIMATED AVERAGE GLUCOSE: 120 MG/DL
GLUCOSE QUALITATIVE U: NEGATIVE MG/DL
GLUCOSE SERPL-MCNC: 90 MG/DL
HBA1C MFR BLD HPLC: 5.8 %
HCT VFR BLD CALC: 39.8 %
HDLC SERPL-MCNC: 40 MG/DL
HGB BLD-MCNC: 12.7 G/DL
KETONES URINE: NEGATIVE MG/DL
LDLC SERPL CALC-MCNC: 85 MG/DL
LEUKOCYTE ESTERASE URINE: ABNORMAL
MCHC RBC-ENTMCNC: 29.7 PG
MCHC RBC-ENTMCNC: 31.9 G/DL
MCV RBC AUTO: 93 FL
NITRITE URINE: POSITIVE
NONHDLC SERPL-MCNC: 103 MG/DL
PH URINE: 5.5
PLATELET # BLD AUTO: 198 K/UL
PMV BLD AUTO: 0 /100 WBCS
PMV BLD: 10.4 FL
POTASSIUM SERPL-SCNC: 4.5 MMOL/L
PROT SERPL-MCNC: 7.5 G/DL
PROTEIN URINE: 30 MG/DL
RBC # BLD: 4.28 M/UL
RBC # FLD: 15.8 %
SODIUM SERPL-SCNC: 141 MMOL/L
SPECIFIC GRAVITY URINE: 1.03
TRIGL SERPL-MCNC: 89 MG/DL
TSH SERPL-ACNC: 9.03 UIU/ML
UROBILINOGEN URINE: 1 MG/DL
WBC # FLD AUTO: 6.26 K/UL

## 2025-01-16 NOTE — HISTORY OF PRESENT ILLNESS
Give Z-Elio and Medrol Dosepak for 5 days  Tessalon Perles for cough as needed and Singulair/montelukast to help with drainage once daily   [Current] : current smoker [>= 30 pack years] : >= 30 pack years [TextBox_13] : Mr. FLAVIA TOVAR is a 67 year old man with a history of hypothyroidism and BPH  \par He was seen in the office by Dr. Gaytan for review of eligibility for, as well as, discussion of Low-Dose CT lung cancer screening program. Over the telephone today we reviewed and confirmed that the patient meets screening eligibility criteria:\par -Age: 67 year \par Smoking status:\par -Current smoker\par -Number of pack(s) per day: 1\par -Number of years smoked: 50\par -Number of pack years smokin\par \par Mr. TOVAR denies any signs or symptoms of lung cancer including new cough, change in cough, hemoptysis and unintentional weight loss. \par Mr. TOVAR denies any personal history of lung cancer. No lung cancer in a 1st degree relative. Denies any history of lung disease. Denies any history of occupational exposures\par \par  [TextBox_6] : 1 [TextBox_8] : 50

## 2025-03-05 ENCOUNTER — NON-APPOINTMENT (OUTPATIENT)
Age: 70
End: 2025-03-05

## 2025-03-05 ENCOUNTER — APPOINTMENT (OUTPATIENT)
Dept: CARDIOLOGY | Facility: CLINIC | Age: 70
End: 2025-03-05
Payer: MEDICARE

## 2025-03-05 VITALS
OXYGEN SATURATION: 97 % | HEIGHT: 67 IN | DIASTOLIC BLOOD PRESSURE: 76 MMHG | BODY MASS INDEX: 26.84 KG/M2 | RESPIRATION RATE: 16 BRPM | HEART RATE: 62 BPM | WEIGHT: 171 LBS | SYSTOLIC BLOOD PRESSURE: 124 MMHG

## 2025-03-05 DIAGNOSIS — E78.2 MIXED HYPERLIPIDEMIA: ICD-10-CM

## 2025-03-05 DIAGNOSIS — E66.3 OVERWEIGHT: ICD-10-CM

## 2025-03-05 DIAGNOSIS — F17.200 NICOTINE DEPENDENCE, UNSPECIFIED, UNCOMPLICATED: ICD-10-CM

## 2025-03-05 DIAGNOSIS — I25.10 ATHEROSCLEROTIC HEART DISEASE OF NATIVE CORONARY ARTERY W/OUT ANGINA PECTORIS: ICD-10-CM

## 2025-03-05 PROCEDURE — 99204 OFFICE O/P NEW MOD 45 MIN: CPT

## 2025-03-05 PROCEDURE — 93000 ELECTROCARDIOGRAM COMPLETE: CPT

## 2025-09-03 ENCOUNTER — APPOINTMENT (OUTPATIENT)
Dept: CARDIOLOGY | Facility: CLINIC | Age: 70
End: 2025-09-03
Payer: MEDICARE

## 2025-09-03 VITALS
SYSTOLIC BLOOD PRESSURE: 86 MMHG | WEIGHT: 171 LBS | HEIGHT: 67 IN | HEART RATE: 69 BPM | BODY MASS INDEX: 26.84 KG/M2 | DIASTOLIC BLOOD PRESSURE: 54 MMHG | OXYGEN SATURATION: 98 %

## 2025-09-03 DIAGNOSIS — I25.10 ATHEROSCLEROTIC HEART DISEASE OF NATIVE CORONARY ARTERY W/OUT ANGINA PECTORIS: ICD-10-CM

## 2025-09-03 DIAGNOSIS — F17.200 NICOTINE DEPENDENCE, UNSPECIFIED, UNCOMPLICATED: ICD-10-CM

## 2025-09-03 DIAGNOSIS — E78.2 MIXED HYPERLIPIDEMIA: ICD-10-CM

## 2025-09-03 DIAGNOSIS — I95.9 HYPOTENSION, UNSPECIFIED: ICD-10-CM

## 2025-09-03 DIAGNOSIS — E66.3 OVERWEIGHT: ICD-10-CM

## 2025-09-03 PROCEDURE — G2211 COMPLEX E/M VISIT ADD ON: CPT

## 2025-09-03 PROCEDURE — 99214 OFFICE O/P EST MOD 30 MIN: CPT

## 2025-09-03 PROCEDURE — 93000 ELECTROCARDIOGRAM COMPLETE: CPT
